# Patient Record
Sex: MALE | Race: WHITE | NOT HISPANIC OR LATINO | Employment: FULL TIME | URBAN - METROPOLITAN AREA
[De-identification: names, ages, dates, MRNs, and addresses within clinical notes are randomized per-mention and may not be internally consistent; named-entity substitution may affect disease eponyms.]

---

## 2017-01-09 DIAGNOSIS — E11.9 TYPE 2 DIABETES MELLITUS WITHOUT COMPLICATION (H): Primary | ICD-10-CM

## 2017-01-09 NOTE — TELEPHONE ENCOUNTER
Pending Prescriptions:                       Disp   Refills    metFORMIN (GLUCOPHAGE) 500 MG tablet      360 ta*4            Sig: Take 2 tablets (1,000 mg) by mouth 2 times daily           (with meals)             Last Written Prescription Date: 11/03/2015  Last Fill Quantity: 360, # refills: 4  Last Office Visit with [facility], [facility] or [facility] prescribing provider:  01/27/2015     Next 5 appointments (look out 90 days)     Jan 18, 2017  8:30 AM   [facility]    [id number] [address] [address] [phone]                   BP Readings from Last 3 Encounters:   11/03/15 124/72   01/27/15 128/78   01/08/15 141/73     MICROL       47   11/3/2015  No results found for this basename: microalbumin  CREATININE   Date Value Ref Range Status   11/03/2015 1.02 0.66 - 1.25 mg/dL Final   ]  GFR ESTIMATE   Date Value Ref Range Status   11/03/2015 78 >60 mL/min/1.7m2 Final     Comment:     Non  GFR Calc   10/24/2014 79 >60 mL/min/1.7m2 Final     Comment:     Non  GFR Calc   05/21/2014 81 >60 mL/min/1.7m2 Final     GFR ESTIMATE IF BLACK   Date Value Ref Range Status   11/03/2015 >90   GFR Calc   >60 mL/min/1.7m2 Final   10/24/2014 >90   GFR Calc   >60 mL/min/1.7m2 Final   05/21/2014 >90 >60 mL/min/1.7m2 Final     CHOL      148   11/3/2015  HDL       40   11/3/2015  LDL       78   11/3/2015  TRIG      151   11/3/2015  CHOLHDLRATIO      3.7   11/3/2015  AST       12   10/24/2014  ALT       28   10/24/2014  A1C      6.3   11/3/2015  A1C      6.4   10/24/2014  A1C      6.5   4/10/2014  A1C      5.7   9/3/2013  A1C      5.7   9/13/2012  POTASSIUM   Date Value Ref Range Status   11/03/2015 4.2 3.4 - 5.3 mmol/L Final     Lázaro OSMANT

## 2017-01-09 NOTE — TELEPHONE ENCOUNTER
Medication is being filled for 1 time refill only due to:  Patient needs to be seen because it has been more than one year since last visit.     Anahi Garcia RN, BSN

## 2017-01-23 ENCOUNTER — OFFICE VISIT (OUTPATIENT)
Dept: FAMILY MEDICINE | Facility: CLINIC | Age: 52
End: 2017-01-23
Payer: COMMERCIAL

## 2017-01-23 ENCOUNTER — ANTICOAGULATION THERAPY VISIT (OUTPATIENT)
Dept: NURSING | Facility: CLINIC | Age: 52
End: 2017-01-23
Payer: COMMERCIAL

## 2017-01-23 VITALS
HEIGHT: 71 IN | SYSTOLIC BLOOD PRESSURE: 136 MMHG | DIASTOLIC BLOOD PRESSURE: 80 MMHG | BODY MASS INDEX: 39.76 KG/M2 | TEMPERATURE: 97.7 F | WEIGHT: 284 LBS | HEART RATE: 88 BPM | OXYGEN SATURATION: 97 %

## 2017-01-23 DIAGNOSIS — Z79.01 LONG-TERM (CURRENT) USE OF ANTICOAGULANTS: Primary | ICD-10-CM

## 2017-01-23 DIAGNOSIS — I10 HYPERTENSION GOAL BP (BLOOD PRESSURE) < 140/90: ICD-10-CM

## 2017-01-23 DIAGNOSIS — E11.9 TYPE 2 DIABETES MELLITUS WITHOUT COMPLICATION, WITHOUT LONG-TERM CURRENT USE OF INSULIN (H): ICD-10-CM

## 2017-01-23 DIAGNOSIS — Z23 NEED FOR PROPHYLACTIC VACCINATION AND INOCULATION AGAINST INFLUENZA: ICD-10-CM

## 2017-01-23 DIAGNOSIS — Z00.00 ROUTINE GENERAL MEDICAL EXAMINATION AT A HEALTH CARE FACILITY: Primary | ICD-10-CM

## 2017-01-23 DIAGNOSIS — I26.99 OTHER ACUTE PULMONARY EMBOLISM WITHOUT ACUTE COR PULMONALE (H): ICD-10-CM

## 2017-01-23 DIAGNOSIS — E78.5 HYPERLIPIDEMIA LDL GOAL <100: ICD-10-CM

## 2017-01-23 LAB
ALT SERPL W P-5'-P-CCNC: 34 U/L (ref 0–70)
ANION GAP SERPL CALCULATED.3IONS-SCNC: 8 MMOL/L (ref 3–14)
BUN SERPL-MCNC: 18 MG/DL (ref 7–30)
CALCIUM SERPL-MCNC: 9.4 MG/DL (ref 8.5–10.1)
CHLORIDE SERPL-SCNC: 105 MMOL/L (ref 94–109)
CHOLEST SERPL-MCNC: 167 MG/DL
CO2 SERPL-SCNC: 27 MMOL/L (ref 20–32)
CREAT SERPL-MCNC: 1.16 MG/DL (ref 0.66–1.25)
CREAT UR-MCNC: 419 MG/DL
GFR SERPL CREATININE-BSD FRML MDRD: 67 ML/MIN/1.7M2
GLUCOSE SERPL-MCNC: 213 MG/DL (ref 70–99)
HBA1C MFR BLD: 6.8 % (ref 4.3–6)
HDLC SERPL-MCNC: 44 MG/DL
INR POINT OF CARE: 2.1 (ref 0.86–1.14)
LDLC SERPL CALC-MCNC: 91 MG/DL
MICROALBUMIN UR-MCNC: 41 MG/L
MICROALBUMIN/CREAT UR: 9.86 MG/G CR (ref 0–17)
NONHDLC SERPL-MCNC: 123 MG/DL
POTASSIUM SERPL-SCNC: 4.7 MMOL/L (ref 3.4–5.3)
SODIUM SERPL-SCNC: 140 MMOL/L (ref 133–144)
TRIGL SERPL-MCNC: 162 MG/DL
TSH SERPL DL<=0.005 MIU/L-ACNC: 2.01 MU/L (ref 0.4–4)

## 2017-01-23 PROCEDURE — 80061 LIPID PANEL: CPT | Performed by: FAMILY MEDICINE

## 2017-01-23 PROCEDURE — 36416 COLLJ CAPILLARY BLOOD SPEC: CPT

## 2017-01-23 PROCEDURE — 99396 PREV VISIT EST AGE 40-64: CPT | Performed by: FAMILY MEDICINE

## 2017-01-23 PROCEDURE — 90686 IIV4 VACC NO PRSV 0.5 ML IM: CPT | Performed by: FAMILY MEDICINE

## 2017-01-23 PROCEDURE — 99207 ZZC NO CHARGE NURSE ONLY: CPT

## 2017-01-23 PROCEDURE — 80048 BASIC METABOLIC PNL TOTAL CA: CPT | Performed by: FAMILY MEDICINE

## 2017-01-23 PROCEDURE — 84460 ALANINE AMINO (ALT) (SGPT): CPT | Performed by: FAMILY MEDICINE

## 2017-01-23 PROCEDURE — 83036 HEMOGLOBIN GLYCOSYLATED A1C: CPT | Performed by: FAMILY MEDICINE

## 2017-01-23 PROCEDURE — 85610 PROTHROMBIN TIME: CPT | Mod: QW

## 2017-01-23 PROCEDURE — 90471 IMMUNIZATION ADMIN: CPT | Performed by: FAMILY MEDICINE

## 2017-01-23 PROCEDURE — 84443 ASSAY THYROID STIM HORMONE: CPT | Performed by: FAMILY MEDICINE

## 2017-01-23 PROCEDURE — 82043 UR ALBUMIN QUANTITATIVE: CPT | Performed by: FAMILY MEDICINE

## 2017-01-23 RX ORDER — ATENOLOL 50 MG/1
50 TABLET ORAL DAILY
Qty: 90 TABLET | Refills: 4 | Status: SHIPPED | OUTPATIENT
Start: 2017-01-23 | End: 2018-03-30

## 2017-01-23 RX ORDER — WARFARIN SODIUM 5 MG/1
TABLET ORAL
Qty: 90 TABLET | Refills: 1 | Status: SHIPPED | OUTPATIENT
Start: 2017-01-23 | End: 2017-06-21

## 2017-01-23 RX ORDER — SIMVASTATIN 20 MG
20 TABLET ORAL AT BEDTIME
Qty: 90 TABLET | Refills: 4 | Status: SHIPPED | OUTPATIENT
Start: 2017-01-23 | End: 2018-03-30

## 2017-01-23 RX ORDER — LISINOPRIL AND HYDROCHLOROTHIAZIDE 12.5; 2 MG/1; MG/1
2 TABLET ORAL DAILY
Qty: 180 TABLET | Refills: 4 | Status: SHIPPED | OUTPATIENT
Start: 2017-01-23 | End: 2018-03-30

## 2017-01-23 RX ORDER — GLIPIZIDE 5 MG/1
5 TABLET, FILM COATED, EXTENDED RELEASE ORAL DAILY
Qty: 90 TABLET | Refills: 4 | Status: SHIPPED | OUTPATIENT
Start: 2017-01-23 | End: 2018-03-08

## 2017-01-23 NOTE — PROGRESS NOTES
Injectable Influenza Immunization Documentation    1.  Is the person to be vaccinated sick today?  No    2. Does the person to be vaccinated have an allergy to eggs or to a component of the vaccine?  No    3. Has the person to be vaccinated today ever had a serious reaction to influenza vaccine in the past?  No    4. Has the person to be vaccinated ever had Guillain-Keeling syndrome?  No     Form completed by BERYL Colon

## 2017-01-23 NOTE — PROGRESS NOTES
ANTICOAGULATION FOLLOW-UP CLINIC VISIT    Patient Name:  Schuyler Castro  Date:  1/23/2017  Contact Type:  Face to Face    SUBJECTIVE:     Patient Findings     Positives No Problem Findings           OBJECTIVE    INR PROTIME   Date Value Ref Range Status   01/23/2017 2.1* 0.86 - 1.14 Final       ASSESSMENT / PLAN  INR assessment THER    Recheck INR In: 6 WEEKS    INR Location Clinic      Anticoagulation Summary as of 1/23/2017     INR goal 2.0-3.0   Selected INR 2.1 (1/23/2017)   Maintenance plan 5 mg (5 mg x 1) every day   Full instructions 5 mg every day   Weekly total 35 mg   No change documented Delia Garcia RN   Plan last modified Delia Garcia RN (1/7/2016)   Next INR check 3/8/2017   Target end date     Indications   Long-term (current) use of anticoagulants [Z79.01] [Z79.01]         Anticoagulation Episode Summary     INR check location     Preferred lab     Send INR reminders to RN POOL - LK    Comments             See the Encounter Report to view Anticoagulation Flowsheet and Dosing Calendar (Go to Encounters tab in chart review, and find the Anticoagulation Therapy Visit)        Delia Garcia RN

## 2017-01-23 NOTE — PATIENT INSTRUCTIONS
Preventive Health Recommendations  Male Ages 40 to 49    Yearly exam:             See your health care provider every year in order to  o   Review health changes.   o   Discuss preventive care.    o   Review your medicines if your doctor has prescribed any.    You should be tested each year for STDs (sexually transmitted diseases) if you re at risk.     Have a cholesterol test every 5 years.     Have a colonoscopy (test for colon cancer) if someone in your family has had colon cancer or polyps before age 50.     After age 45, have a diabetes test (fasting glucose). If you are at risk for diabetes, you should have this test every 3 years.      Talk with your health care provider about whether or not a prostate cancer screening test (PSA) is right for you.    Shots: Get a flu shot each year. Get a tetanus shot every 10 years.     Nutrition:    Eat at least 5 servings of fruits and vegetables daily.     Eat whole-grain bread, whole-wheat pasta and brown rice instead of white grains and rice.     Talk to your provider about Calcium and Vitamin D.     Lifestyle    Exercise for at least 150 minutes a week (30 minutes a day, 5 days a week). This will help you control your weight and prevent disease.     Limit alcohol to one drink per day.     No smoking.     Wear sunscreen to prevent skin cancer.     See your dentist every six months for an exam and cleaning.    Diabetes plan:  1)  My goal A1C is: <7.0  My last A1C is: A1C      6.3   11/3/2015    2)  My goal LDL cholesterol is under 100  My last LDL is: LDL       78   11/3/2015    3)  Goal blood pressure under 140/90  Today my blood pressure was 136/80    4)  Take aspirin daily unless otherwise directed    5)  No tobacco use    6)  Annual eye exam - please have note sent to your primary care doctor    Care Plan changes:  Increase activity, lose 25 lbs    Eat to live - Dr. Braxton  Mediterranean diet  Schuyler Pollen - Food rules    Follow up:  Follow up with my physician:   In six months.

## 2017-01-23 NOTE — NURSING NOTE
"Chief Complaint   Patient presents with     Physical       Initial /80 mmHg  Pulse 88  Temp(Src) 97.7  F (36.5  C) (Oral)  Ht 5' 10.5\" (1.791 m)  Wt 284 lb (128.822 kg)  BMI 40.16 kg/m2  SpO2 97% Estimated body mass index is 40.16 kg/(m^2) as calculated from the following:    Height as of this encounter: 5' 10.5\" (1.791 m).    Weight as of this encounter: 284 lb (128.822 kg).      Neris Chavez [facility]    Health Maintenance- Reviewed.                "

## 2017-01-23 NOTE — MR AVS SNAPSHOT
After Visit Summary   1/23/2017    Schuyler Castro    MRN: [id number]           Patient Information     Date Of Birth          1965        Visit Information        Provider Department      1/23/2017 8:30 AM Jaspal Moreno MD [facility]        Today's Diagnoses     Routine general medical examination at a health care facility    -  1     Type 2 diabetes mellitus without complication, without long-term current use of insulin (H)         Other acute pulmonary embolism without acute cor pulmonale (H)         Hypertension goal BP (blood pressure) < 140/90         Hyperlipidemia LDL goal <100           Care Instructions      Preventive Health Recommendations  Male Ages 40 to 49    Yearly exam:             See your health care provider every year in order to  o   Review health changes.   o   Discuss preventive care.    o   Review your medicines if your doctor has prescribed any.    You should be tested each year for STDs (sexually transmitted diseases) if you re at risk.     Have a cholesterol test every 5 years.     Have a colonoscopy (test for colon cancer) if someone in your family has had colon cancer or polyps before age 50.     After age 45, have a diabetes test (fasting glucose). If you are at risk for diabetes, you should have this test every 3 years.      Talk with your health care provider about whether or not a prostate cancer screening test (PSA) is right for you.    Shots: Get a flu shot each year. Get a tetanus shot every 10 years.     Nutrition:    Eat at least 5 servings of fruits and vegetables daily.     Eat whole-grain bread, whole-wheat pasta and brown rice instead of white grains and rice.     Talk to your provider about Calcium and Vitamin D.     Lifestyle    Exercise for at least 150 minutes a week (30 minutes a day, 5 days a week). This will help you control your weight and prevent disease.     Limit alcohol to one drink per day.     No smoking.     Wear  sunscreen to prevent skin cancer.     See your dentist every six months for an exam and cleaning.    Diabetes plan:  1)  My goal A1C is: <7.0  My last A1C is: A1C      6.3   11/3/2015    2)  My goal LDL cholesterol is under 100  My last LDL is: LDL       78   11/3/2015    3)  Goal blood pressure under 140/90  Today my blood pressure was 136/80    4)  Take aspirin daily unless otherwise directed    5)  No tobacco use    6)  Annual eye exam - please have note sent to your primary care doctor    Care Plan changes:  Increase activity, lose 25 lbs    Eat to live - Dr. Braxton  Mediterranean diet  Schuyler Pollen - Food rules    Follow up:  Follow up with my physician:  In six months.        Follow-ups after your visit        Additional Services     INR CLINIC REFERRAL       Your provider has referred you to INR Services.    Please be aware that coverage of these services is subject to the terms and limitations of your health insurance plan.  Call member services at your health plan with any benefit or coverage questions.    Indication for Anticoagulation: Pulmonary Embolism  If nonstandard INR is desired, indicate goal range and explanation:   Expected Duration of Therapy: Lifetime                  Your next 10 appointments already scheduled     Feb 08, 2017  8:30 AM   Anticoagulation Visit with  ANTICOAGULATION CLINIC   [facility]    [address] [address] [address] [phone]              Who to contact     If you have questions or need follow up information about today's clinic visit or your schedule please contact [facility] directly at [phone].  Normal or non-critical lab and imaging results will be communicated to you by MyChart, letter or phone within 4 business days after the clinic has received the results. If you do not hear from us within 7 days, please contact the clinic through MyChart or phone. If you have a critical or  "abnormal lab result, we will notify you by phone as soon as possible.  Submit refill requests through [employer] or call your pharmacy and they will forward the refill request to us. Please allow 3 business days for your refill to be completed.          Additional Information About Your Visit        [employer] Information     [employer] gives you secure access to your electronic health record. If you see a primary care provider, you can also send messages to your care team and make appointments. If you have questions, please call your primary care clinic.  If you do not have a primary care provider, please call [phone] and they will assist you.        Care EveryWhere ID     This is your Care EveryWhere ID. This could be used by other organizations to access your [facility] medical records  [id number]        Your Vitals Were     Pulse Temperature Height BMI (Body Mass Index) Pulse Oximetry       88 97.7  F (36.5  C) (Oral) 5' 10.5\" (1.791 m) 40.16 kg/m2 97%        Blood Pressure from Last 3 Encounters:   01/23/17 136/80   11/03/15 124/72   01/27/15 128/78    Weight from Last 3 Encounters:   01/23/17 284 lb (128.822 kg)   11/03/15 281 lb (127.461 kg)   01/27/15 274 lb 4 oz (124.399 kg)              We Performed the Following     Albumin Random Urine Quantitative     ALT     Basic metabolic panel     Hemoglobin A1c     INR CLINIC REFERRAL     Lipid panel reflex to direct LDL     TSH with free T4 reflex          Today's Medication Changes          These changes are accurate as of: 1/23/17  9:06 AM.  If you have any questions, ask your nurse or doctor.               Start taking these medicines.        Dose/Directions    blood glucose monitoring test strip   Commonly known as:  no brand specified   Used for:  Type 2 diabetes mellitus without complication, without long-term current use of insulin (H)   Started by:  Jaspal Moreno MD        Use to test blood sugars 1-2 times daily or as directed   Quantity:  100 strip "   Refills:  3         These medicines have changed or have updated prescriptions.        Dose/Directions    * warfarin 5 MG tablet   Commonly known as:  COUMADIN   This may have changed:  Another medication with the same name was added. Make sure you understand how and when to take each.   Used for:  Personal history of DVT (deep vein thrombosis), History of pulmonary embolism, Anticoagulation monitoring, INR range 2-3   Changed by:  Jaspal Moreno MD        Take 5 mg daily   Quantity:  90 tablet   Refills:  1       * warfarin 5 MG tablet   Commonly known as:  COUMADIN   This may have changed:  You were already taking a medication with the same name, and this prescription was added. Make sure you understand how and when to take each.   Used for:  Long-term (current) use of anticoagulants        5 mg daily or as directed by INR clinic   Quantity:  90 tablet   Refills:  1       * Notice:  This list has 2 medication(s) that are the same as other medications prescribed for you. Read the directions carefully, and ask your doctor or other care provider to review them with you.      Stop taking these medicines if you haven't already. Please contact your care team if you have questions.     order for DME   Stopped by:  Jaspal Moreno MD                Where to get your medicines      These medications were sent to [place] [address] [place] [facility] [id number] [facility] [address]    Hours:  Tech issues with their phone system Phone:  [phone]    - blood glucose monitoring test strip  - warfarin 5 MG tablet             Primary Care Provider Office Phone # Fax #    Jaspal Moreno [phone]       [facility] [address]  [facility] [place] [address]        Thank you!     Thank you for choosing [facility]  for your care. Our goal is always to provide you with excellent care. Hearing back from our patients is one way we can continue to  improve our services. Please take a few minutes to complete the written survey that you may receive in the mail after your visit with us. Thank you!             Your Updated Medication List - Protect others around you: Learn how to safely use, store and throw away your medicines at www.disposemymeds.org.          This list is accurate as of: 1/23/17  9:06 AM.  Always use your most recent med list.                   Brand Name Dispense Instructions for use    aspirin 81 MG tablet     30 tablet    Take 1 tablet (81 mg) by mouth daily       atenolol 50 MG tablet    TENORMIN    90 tablet    Take 1 tablet (50 mg) by mouth daily       blood glucose monitoring meter device kit    no brand specified    1 kit    Use to test blood sugars 1-2 times daily or as directed.       blood glucose monitoring test strip    no brand specified    100 strip    Use to test blood sugars 1-2 times daily or as directed       glipiZIDE 5 MG 24 hr tablet    glipiZIDE XL    90 tablet    Take 1 tablet (5 mg) by mouth daily       lisinopril-hydrochlorothiazide 20-12.5 MG per tablet    PRINZIDE/ZESTORETIC    180 tablet    Take 2 tablets by mouth daily       metFORMIN 500 MG tablet    GLUCOPHAGE    360 tablet    Take 2 tablets (1,000 mg) by mouth 2 times daily (with meals)       simvastatin 20 MG tablet    ZOCOR    30 tablet    Take 1 tablet (20 mg) by mouth At Bedtime       TYLENOL PO      Take 650 mg by mouth       * warfarin 5 MG tablet    COUMADIN    90 tablet    Take 5 mg daily       * warfarin 5 MG tablet    COUMADIN    90 tablet    5 mg daily or as directed by INR clinic       * Notice:  This list has 2 medication(s) that are the same as other medications prescribed for you. Read the directions carefully, and ask your doctor or other care provider to review them with you.

## 2017-01-23 NOTE — PROGRESS NOTES
SUBJECTIVE:     CC: Schuyler aCstro is an 51 year old male who presents for preventative health visit.     Healthy Habits:    Do you get at least three servings of calcium containing foods daily (dairy, green leafy vegetables, etc.)? yes    Amount of exercise or daily activities, outside of work: 2 days     Problems taking medications regularly No    Medication side effects: No    Have you had an eye exam in the past two years? no    Do you see a dentist twice per year? no  Do you have sleep apnea, excessive snoring or daytime drowsiness?no    History of diabetes diagnosed in 2002, type II currently well controlled. Diabetes without complication. Stable on max dose metformin and glipizide 5 mg daily.    History of hypertension currently well controlled on thiazide diuretic, B- blocker, and ACE inhibitor.  Denies shortness or breath, chest pain, headache, vision change, or lower extremity edema.    On Coumadin for previous extensive PE and DVT with one occurrence in the past in 2010 but recommended to continue with extensive PE.    Today's PHQ-2 Score:   PHQ-2 ( 1999 Pf[employer]) 11/3/2015 1/27/2015   Q1: Little interest or pleasure in doing things 0 0   Q2: Feeling down, depressed or hopeless 0 0   PHQ-2 Score 0 0     Abuse: Current or Past(Physical, Sexual or Emotional)- No  Do you feel safe in your environment - Yes    Social History   Substance Use Topics     Smoking status: Never Smoker      Smokeless tobacco: Never Used     Alcohol Use: 0.0 - 0.6 oz/week     0-1 Standard drinks or equivalent per week     The patient does not drink >3 drinks per day nor >7 drinks per week.    Last PSA:   PSA   Date Value Ref Range Status   08/24/2010 0.92 0 - 4 ug/L Final       Recent Labs   Lab Test  11/03/15   0734  10/24/14   0839   CHOL  148  150   HDL  40*  46   LDL  78  79   TRIG  151*  123   CHOLHDLRATIO  3.7  3.3       Reviewed orders with patient. Reviewed health maintenance and updated orders accordingly - Yes    All  "Histories reviewed and updated in Epic.  Past Medical History   Diagnosis Date     Pulmonary embolus (H) 8/2010     DVT of deep femoral vein (H) 8/2010     Diabetes mellitus type 2, noninsulin dependent (H) 2002     Hypertension 1996      History reviewed. No pertinent past surgical history.    ROS:  C: NEGATIVE for fever, chills, change in weight  I: NEGATIVE for worrisome rashes, moles or lesions  E: NEGATIVE for vision changes or irritation  ENT: NEGATIVE for ear, mouth and throat problems  R: NEGATIVE for significant cough or SOB  CV: NEGATIVE for chest pain, palpitations or peripheral edema  GI: NEGATIVE for nausea, abdominal pain, heartburn, or change in bowel habits   male: negative for dysuria, hematuria, decreased urinary stream, erectile dysfunction, urethral discharge  M: NEGATIVE for significant arthralgias or myalgia  N: NEGATIVE for weakness, dizziness or paresthesias  P: NEGATIVE for changes in mood or affect    Problem list, Medication list, Allergies, and Medical/Social/Surgical histories reviewed in [facility] and updated as appropriate.  OBJECTIVE:     /80 mmHg  Pulse 88  Temp(Src) 97.7  F (36.5  C) (Oral)  Ht 5' 10.5\" (1.791 m)  Wt 284 lb (128.822 kg)  BMI 40.16 kg/m2  SpO2 97%  EXAM:  GENERAL: alert, no distress and obese  EYES: Eyes grossly normal to inspection, PERRL and conjunctivae and sclerae normal  HENT: ear canals and TM's normal, nose and mouth without ulcers or lesions  NECK: no adenopathy, no asymmetry, masses, or scars and thyroid normal to palpation  RESP: lungs clear to auscultation - no rales, rhonchi or wheezes  CV: regular rate and rhythm, normal S1 S2, no S3 or S4, no murmur, click or rub, no peripheral edema and peripheral pulses strong  ABDOMEN: soft, nontender, no hepatosplenomegaly, no masses and bowel sounds normal   (male): normal male genitalia without lesions or urethral discharge, no hernia  MS: no gross musculoskeletal defects noted, no edema  SKIN: no " "suspicious lesions or rashes  NEURO: Normal strength and tone, mentation intact and speech normal  PSYCH: mentation appears normal, affect normal/bright    ASSESSMENT/PLAN:     1. Routine general medical examination at a health care facility    2. Type 2 diabetes mellitus without complication, without long-term current use of insulin (H)  Currently controlled.  Continue current medication.  - Hemoglobin A1c  - Albumin Random Urine Quantitative  - TSH with free T4 reflex  - ALT  - Lipid panel reflex to direct LDL  - Basic metabolic panel  - blood glucose monitoring (NO BRAND SPECIFIED) test strip; Use to test blood sugars 1-2 times daily or as directed  Dispense: 100 strip; Refill: 3  - metFORMIN (GLUCOPHAGE) 500 MG tablet; Take 2 tablets (1,000 mg) by mouth 2 times daily (with meals)  Dispense: 360 tablet; Refill: 4  - glipiZIDE (GLIPIZIDE XL) 5 MG 24 hr tablet; Take 1 tablet (5 mg) by mouth daily  Dispense: 90 tablet; Refill: 4    3. Other acute pulmonary embolism without acute cor pulmonale (H)  - INR CLINIC REFERRAL    4. Hypertension goal BP (blood pressure) < 140/90  - lisinopril-hydrochlorothiazide (PRINZIDE/ZESTORETIC) 20-12.5 MG per tablet; Take 2 tablets by mouth daily  Dispense: 180 tablet; Refill: 4  - atenolol (TENORMIN) 50 MG tablet; Take 1 tablet (50 mg) by mouth daily  Dispense: 90 tablet; Refill: 4    5. Hyperlipidemia LDL goal <100  - simvastatin (ZOCOR) 20 MG tablet; Take 1 tablet (20 mg) by mouth At Bedtime  Dispense: 90 tablet; Refill: 4    COUNSELING:  Reviewed preventive health counseling, as reflected in patient instructions     reports that he has never smoked. He has never used smokeless tobacco.    Estimated body mass index is 40.16 kg/(m^2) as calculated from the following:    Height as of this encounter: 5' 10.5\" (1.791 m).    Weight as of this encounter: 284 lb (128.822 kg).   Weight management plan: Discussed healthy diet and exercise guidelines and patient will follow up in 12 months " in clinic to re-evaluate.    Counseling Resources:  ATP IV Guidelines  Pooled Cohorts Equation Calculator  FRAX Risk Assessment  ICSI Preventive Guidelines  Dietary Guidelines for Americans, 2010  USDA's MyPlate  ASA Prophylaxis  Lung CA Screening    Jaspal Moreno MD  [facility]

## 2017-02-20 ENCOUNTER — TELEPHONE (OUTPATIENT)
Dept: FAMILY MEDICINE | Facility: CLINIC | Age: 52
End: 2017-02-20

## 2017-04-10 ENCOUNTER — ANTICOAGULATION THERAPY VISIT (OUTPATIENT)
Dept: NURSING | Facility: CLINIC | Age: 52
End: 2017-04-10
Payer: COMMERCIAL

## 2017-04-10 DIAGNOSIS — Z79.01 LONG-TERM (CURRENT) USE OF ANTICOAGULANTS: ICD-10-CM

## 2017-04-10 LAB — INR POINT OF CARE: 2.1 (ref 0.86–1.14)

## 2017-04-10 PROCEDURE — 85610 PROTHROMBIN TIME: CPT | Mod: QW

## 2017-04-10 PROCEDURE — 36416 COLLJ CAPILLARY BLOOD SPEC: CPT

## 2017-04-10 NOTE — PROGRESS NOTES
ANTICOAGULATION FOLLOW-UP CLINIC VISIT    Patient Name:  Schuyler Castro  Date:  4/10/2017  Contact Type:  Face to Face    SUBJECTIVE:     Patient Findings     Positives No Problem Findings           OBJECTIVE    INR Protime   Date Value Ref Range Status   04/10/2017 2.1 (A) 0.86 - 1.14 Final       ASSESSMENT / PLAN  INR assessment THER    Recheck INR In: 6 WEEKS    INR Location Clinic      Anticoagulation Summary as of 4/10/2017     INR goal 2.0-3.0   Today's INR 2.1   Maintenance plan 5 mg (5 mg x 1) every day   Full instructions 5 mg every day   Weekly total 35 mg   No change documented Delia Garcia RN   Plan last modified Delia Garcia RN (1/7/2016)   Next INR check 5/22/2017   Target end date     Indications   Long-term (current) use of anticoagulants [Z79.01] [Z79.01]         Anticoagulation Episode Summary     INR check location     Preferred lab     Send INR reminders to RN POOL - JORGE    Comments             See the Encounter Report to view Anticoagulation Flowsheet and Dosing Calendar (Go to Encounters tab in chart review, and find the Anticoagulation Therapy Visit)        Delia Garcia RN

## 2017-04-10 NOTE — MR AVS SNAPSHOT
Schuyler Castro   4/10/2017 10:15 AM   Anticoagulation Therapy Visit    Description:  51 year old male   Provider:   ANTICOAGULATION CLINIC   Department:   Nurse           INR as of 4/10/2017     Today's INR 2.1      Anticoagulation Summary as of 4/10/2017     INR goal 2.0-3.0   Today's INR 2.1   Full instructions 5 mg every day   Next INR check 5/22/2017    Indications   Long-term (current) use of anticoagulants [Z79.01] [Z79.01]         Your next Anticoagulation Clinic appointment(s)     Apr 10, 2017 10:15 AM CDT   Anticoagulation Visit with  ANTICOAGULATION CLINIC   [facility] ([facility])    [address] [id number] [phone]            May 22, 2017  8:30 AM CDT   Anticoagulation Visit with  ANTICOAGULATION CLINIC   [facility] ([facility])    [address] [id number] [phone]              Contact Numbers     [facility]  Please call [phone] with any problems or questions regarding your therapy, or to cancel or reschedule your appointment.          April 2017 Details    Sun Mon Tue Wed Thu Fri Sat           1                 2               3               4               5               6               7               8                 9               10      5 mg   See details      11      5 mg         12      5 mg         13      5 mg         14      5 mg         15      5 mg           16      5 mg         17      5 mg         18      5 mg         19      5 mg         20      5 mg         21      5 mg         22      5 mg           23      5 mg         24      5 mg         25      5 mg         26      5 mg         27      5 mg         28      5 mg         29      5 mg           30      5 mg                Date Details   04/10 This INR check               How to take your warfarin dose     To take:  5 mg Take 1 of the 5 mg tablets.           May 2017 Details    Sun Mon Tue  Wed Thu Fri Sat      1      5 mg         2      5 mg         3      5 mg         4      5 mg         5      5 mg         6      5 mg           7      5 mg         8      5 mg         9      5 mg         10      5 mg         11      5 mg         12      5 mg         13      5 mg           14      5 mg         15      5 mg         16      5 mg         17      5 mg         18      5 mg         19      5 mg         20      5 mg           21      5 mg         22            23               24               25               26               27                 28               29               30               31                   Date Details   No additional details    Date of next INR:  5/22/2017         How to take your warfarin dose     To take:  5 mg Take 1 of the 5 mg tablets.

## 2017-06-21 ENCOUNTER — TELEPHONE (OUTPATIENT)
Dept: FAMILY MEDICINE | Facility: CLINIC | Age: 52
End: 2017-06-21

## 2017-06-21 DIAGNOSIS — Z79.01 LONG-TERM (CURRENT) USE OF ANTICOAGULANTS: ICD-10-CM

## 2017-06-21 RX ORDER — WARFARIN SODIUM 5 MG/1
TABLET ORAL
Qty: 90 TABLET | Refills: 1 | Status: SHIPPED | OUTPATIENT
Start: 2017-06-21 | End: 2017-12-11

## 2017-06-30 ENCOUNTER — ANTICOAGULATION THERAPY VISIT (OUTPATIENT)
Dept: NURSING | Facility: CLINIC | Age: 52
End: 2017-06-30
Payer: COMMERCIAL

## 2017-06-30 DIAGNOSIS — Z79.01 LONG-TERM (CURRENT) USE OF ANTICOAGULANTS: ICD-10-CM

## 2017-06-30 LAB — INR POINT OF CARE: 2.1 (ref 0.86–1.14)

## 2017-06-30 PROCEDURE — 36416 COLLJ CAPILLARY BLOOD SPEC: CPT

## 2017-06-30 PROCEDURE — 85610 PROTHROMBIN TIME: CPT | Mod: QW

## 2017-06-30 PROCEDURE — 99207 ZZC NO CHARGE NURSE ONLY: CPT

## 2017-06-30 NOTE — MR AVS SNAPSHOT
Schuyler Castro   6/30/2017 9:30 AM   Anticoagulation Therapy Visit    Description:  51 year old male   Provider:   ANTICOAGULATION CLINIC   Department:   Nurse           INR as of 6/30/2017     Today's INR 2.1      Anticoagulation Summary as of 6/30/2017     INR goal 2.0-3.0   Today's INR 2.1   Full instructions 5 mg every day   Next INR check 8/11/2017    Indications   Long-term (current) use of anticoagulants [Z79.01] [Z79.01]         Your next Anticoagulation Clinic appointment(s)     Aug 11, 2017  9:30 AM CDT   Anticoagulation Visit with  ANTICOAGULATION CLINIC   [facility] ([facility])    [address] [address] [phone]              Contact Numbers     [facility]  Please call [phone] with any problems or questions regarding your therapy, or to cancel or reschedule your appointment.          June 2017 Details    Sun Mon Tue Wed Thu Fri Sat         1               2               3                 4               5               6               7               8               9               10                 11               12               13               14               15               16               17                 18               19               20               21               22               23               24                 25               26               27               28               29               30      5 mg   See details        Date Details   06/30 This INR check               How to take your warfarin dose     To take:  5 mg Take 1 of the 5 mg tablets.           July 2017 Details    Sun Mon Tue Wed Thu Fri Sat           1      5 mg           2      5 mg         3      5 mg         4      5 mg         5      5 mg         6      5 mg         7      5 mg         8      5 mg           9      5 mg         10      5 mg         11      5 mg         12      5 mg         13      5 mg         14      5  mg         15      5 mg           16      5 mg         17      5 mg         18      5 mg         19      5 mg         20      5 mg         21      5 mg         22      5 mg           23      5 mg         24      5 mg         25      5 mg         26      5 mg         27      5 mg         28      5 mg         29      5 mg           30      5 mg         31      5 mg               Date Details   No additional details            How to take your warfarin dose     To take:  5 mg Take 1 of the 5 mg tablets.           August 2017 Details    Sun Mon Tue Wed Thu Fri Sat       1      5 mg         2      5 mg         3      5 mg         4      5 mg         5      5 mg           6      5 mg         7      5 mg         8      5 mg         9      5 mg         10      5 mg         11            12                 13               14               15               16               17               18               19                 20               21               22               23               24               25               26                 27               28               29               30               31                  Date Details   No additional details    Date of next INR:  8/11/2017         How to take your warfarin dose     To take:  5 mg Take 1 of the 5 mg tablets.

## 2017-06-30 NOTE — PROGRESS NOTES
ANTICOAGULATION FOLLOW-UP CLINIC VISIT    Patient Name:  Schuyler Castro  Date:  6/30/2017  Contact Type:  Face to Face    SUBJECTIVE:     Patient Findings     Positives No Problem Findings           OBJECTIVE    INR Protime   Date Value Ref Range Status   06/30/2017 2.1 (A) 0.86 - 1.14 Final       ASSESSMENT / PLAN  INR assessment THER    Recheck INR In: 6 WEEKS    INR Location Clinic      Anticoagulation Summary as of 6/30/2017     INR goal 2.0-3.0   Today's INR 2.1   Maintenance plan 5 mg (5 mg x 1) every day   Full instructions 5 mg every day   Weekly total 35 mg   No change documented Delia Garcia RN   Plan last modified Delia Garcia RN (1/7/2016)   Next INR check 8/11/2017   Target end date     Indications   Long-term (current) use of anticoagulants [Z79.01] [Z79.01]         Anticoagulation Episode Summary     INR check location     Preferred lab     Send INR reminders to RN POOL - JORGE    Comments             See the Encounter Report to view Anticoagulation Flowsheet and Dosing Calendar (Go to Encounters tab in chart review, and find the Anticoagulation Therapy Visit)        Delia Garcia RN

## 2017-09-29 ENCOUNTER — ANTICOAGULATION THERAPY VISIT (OUTPATIENT)
Dept: NURSING | Facility: CLINIC | Age: 52
End: 2017-09-29
Payer: COMMERCIAL

## 2017-09-29 DIAGNOSIS — Z79.01 LONG-TERM (CURRENT) USE OF ANTICOAGULANTS: ICD-10-CM

## 2017-09-29 LAB — INR POINT OF CARE: 2.5 (ref 0.86–1.14)

## 2017-09-29 PROCEDURE — 85610 PROTHROMBIN TIME: CPT | Mod: QW

## 2017-09-29 PROCEDURE — 36416 COLLJ CAPILLARY BLOOD SPEC: CPT

## 2017-09-29 NOTE — PROGRESS NOTES
ANTICOAGULATION FOLLOW-UP CLINIC VISIT    Patient Name:  Schuyler Castro  Date:  9/29/2017  Contact Type:  Face to Face    SUBJECTIVE:     Patient Findings     Positives No Problem Findings           OBJECTIVE    INR Protime   Date Value Ref Range Status   09/29/2017 2.5 (A) 0.86 - 1.14 Final       ASSESSMENT / PLAN  No question data found.  Anticoagulation Summary as of 9/29/2017     INR goal 2.0-3.0   Today's INR 2.5   Maintenance plan 5 mg (5 mg x 1) every day   Full instructions 5 mg every day   Weekly total 35 mg   No change documented Delia Garcia RN   Plan last modified Delia Garcia RN (1/7/2016)   Next INR check 11/10/2017   Target end date     Indications   Long-term (current) use of anticoagulants [Z79.01] [Z79.01]         Anticoagulation Episode Summary     INR check location     Preferred lab     Send INR reminders to RN POOL - LK    Comments             See the Encounter Report to view Anticoagulation Flowsheet and Dosing Calendar (Go to Encounters tab in chart review, and find the Anticoagulation Therapy Visit)        Delia Garcia RN

## 2017-09-29 NOTE — MR AVS SNAPSHOT
Schuyler Castro   9/29/2017 8:30 AM   Anticoagulation Therapy Visit    Description:  52 year old male   Provider:   ANTICOAGULATION CLINIC   Department:   Nurse           INR as of 9/29/2017     Today's INR 2.5      Anticoagulation Summary as of 9/29/2017     INR goal 2.0-3.0   Today's INR 2.5   Full instructions 5 mg every day   Next INR check 11/17/2017    Indications   Long-term (current) use of anticoagulants [Z79.01] [Z79.01]         Your next Anticoagulation Clinic appointment(s)     Sep 29, 2017  8:30 AM CDT   Anticoagulation Visit with  ANTICOAGULATION CLINIC   [facility] ([facility]    [address] [id number] [phone]            Nov 17, 2017  8:30 AM CST   Anticoagulation Visit with  ANTICOAGULATION CLINIC   [facility] ([facility])    [address] [id number] [phone]              Contact Numbers     [facility]  Please call [phone] with any problems or questions regarding your therapy, or to cancel or reschedule your appointment.          September 2017 Details    Sun Mon Tue Wed Thu Fri Sat          1               2                 3               4               5               6               7               8               9                 10               11               12               13               14               15               16                 17               18               19               20               21               22               23                 24               25               26               27               28               29      5 mg   See details      30      5 mg          Date Details   09/29 This INR check               How to take your warfarin dose     To take:  5 mg Take 1 of the 5 mg tablets.           October 2017 Details    Sun Mon Tue Wed Thu Fri Sat     1      5 mg         2      5 mg         3      5 mg          4      5 mg         5      5 mg         6      5 mg         7      5 mg           8      5 mg         9      5 mg         10      5 mg         11      5 mg         12      5 mg         13      5 mg         14      5 mg           15      5 mg         16      5 mg         17      5 mg         18      5 mg         19      5 mg         20      5 mg         21      5 mg           22      5 mg         23      5 mg         24      5 mg         25      5 mg         26      5 mg         27      5 mg         28      5 mg           29      5 mg         30      5 mg         31      5 mg              Date Details   No additional details            How to take your warfarin dose     To take:  5 mg Take 1 of the 5 mg tablets.           November 2017 Details    Sun Mon Tue Wed Thu Fri Sat        1      5 mg         2      5 mg         3      5 mg         4      5 mg           5      5 mg         6      5 mg         7      5 mg         8      5 mg         9      5 mg         10      5 mg         11      5 mg           12      5 mg         13      5 mg         14      5 mg         15      5 mg         16      5 mg         17            18                 19               20               21               22               23               24               25                 26               27               28               29               30                  Date Details   No additional details    Date of next INR:  11/17/2017         How to take your warfarin dose     To take:  5 mg Take 1 of the 5 mg tablets.

## 2017-12-11 ENCOUNTER — ANTICOAGULATION THERAPY VISIT (OUTPATIENT)
Dept: NURSING | Facility: CLINIC | Age: 52
End: 2017-12-11
Payer: COMMERCIAL

## 2017-12-11 DIAGNOSIS — Z79.01 LONG-TERM (CURRENT) USE OF ANTICOAGULANTS: ICD-10-CM

## 2017-12-11 LAB — INR POINT OF CARE: 2.2 (ref 0.86–1.14)

## 2017-12-11 PROCEDURE — 85610 PROTHROMBIN TIME: CPT | Mod: QW

## 2017-12-11 PROCEDURE — 36416 COLLJ CAPILLARY BLOOD SPEC: CPT

## 2017-12-11 RX ORDER — WARFARIN SODIUM 5 MG/1
TABLET ORAL
Qty: 90 TABLET | Refills: 1 | Status: SHIPPED | OUTPATIENT
Start: 2017-12-11 | End: 2018-05-31

## 2017-12-11 NOTE — PROGRESS NOTES
ANTICOAGULATION FOLLOW-UP CLINIC VISIT    Patient Name:  Schuyler Castro  Date:  12/11/2017  Contact Type:  Face to Face    SUBJECTIVE:     Patient Findings     Positives No Problem Findings           OBJECTIVE    INR Protime   Date Value Ref Range Status   12/11/2017 2.2 (A) 0.86 - 1.14 Final       ASSESSMENT / PLAN  INR assessment THER    Recheck INR In: 6 WEEKS    INR Location Clinic      Anticoagulation Summary as of 12/11/2017     INR goal 2.0-3.0   Today's INR 2.2   Maintenance plan 5 mg (5 mg x 1) every day   Full instructions 5 mg every day   Weekly total 35 mg   No change documented Delia Garcia RN   Plan last modified Delia Garcia RN (1/7/2016)   Next INR check 1/22/2018   Target end date     Indications   Long-term (current) use of anticoagulants [Z79.01] [Z79.01]         Anticoagulation Episode Summary     INR check location     Preferred lab     Send INR reminders to RN POOL - JORGE    Comments             See the Encounter Report to view Anticoagulation Flowsheet and Dosing Calendar (Go to Encounters tab in chart review, and find the Anticoagulation Therapy Visit)        Delia Garcia RN

## 2017-12-11 NOTE — MR AVS SNAPSHOT
Schuyler Castro   12/11/2017 8:30 AM   Anticoagulation Therapy Visit    Description:  52 year old male   Provider:   ANTICOAGULATION CLINIC   Department:   Nurse           INR as of 12/11/2017     Today's INR 2.2      Anticoagulation Summary as of 12/11/2017     INR goal 2.0-3.0   Today's INR 2.2   Full instructions 5 mg every day   Next INR check 1/22/2018    Indications   Long-term (current) use of anticoagulants [Z79.01] [Z79.01]         Your next Anticoagulation Clinic appointment(s)     Jan 22, 2018  8:45 AM CST   Anticoagulation Visit with  ANTICOAGULATION CLINIC   [facility]    [address] [address] [phone]              Contact Numbers     [facility]  Please call [phone] with any problems or questions regarding your therapy, or to cancel or reschedule your appointment.          December 2017 Details    Sun Mon Tue Wed Thu Fri Sat          1               2                 3               4               5               6               7               8               9                 10               11      5 mg   See details      12      5 mg         13      5 mg         14      5 mg         15      5 mg         16      5 mg           17      5 mg         18      5 mg         19      5 mg         20      5 mg         21      5 mg         22      5 mg         23      5 mg           24      5 mg         25      5 mg         26      5 mg         27      5 mg         28      5 mg         29      5 mg         30      5 mg           31      5 mg                Date Details   12/11 This INR check               How to take your warfarin dose     To take:  5 mg Take 1 of the 5 mg tablets.           January 2018 Details    Sun Mon Tue Wed Thu Fri Sat      1      5 mg         2      5 mg         3      5 mg         4      5 mg         5      5 mg         6      5 mg           7      5 mg         8      5 mg         9       5 mg         10      5 mg         11      5 mg         12      5 mg         13      5 mg           14      5 mg         15      5 mg         16      5 mg         17      5 mg         18      5 mg         19      5 mg         20      5 mg           21      5 mg         22            23               24               25               26               27                 28               29               30               31                   Date Details   No additional details    Date of next INR:  1/22/2018         How to take your warfarin dose     To take:  5 mg Take 1 of the 5 mg tablets.

## 2018-03-12 ENCOUNTER — ANTICOAGULATION THERAPY VISIT (OUTPATIENT)
Dept: NURSING | Facility: CLINIC | Age: 53
End: 2018-03-12
Payer: COMMERCIAL

## 2018-03-12 DIAGNOSIS — Z79.01 LONG-TERM (CURRENT) USE OF ANTICOAGULANTS: ICD-10-CM

## 2018-03-12 LAB — INR POINT OF CARE: 2.3 (ref 0.86–1.14)

## 2018-03-12 PROCEDURE — 99207 ZZC NO CHARGE NURSE ONLY: CPT

## 2018-03-12 PROCEDURE — 36416 COLLJ CAPILLARY BLOOD SPEC: CPT

## 2018-03-12 PROCEDURE — 85610 PROTHROMBIN TIME: CPT | Mod: QW

## 2018-03-12 NOTE — MR AVS SNAPSHOT
Schuyler Castro   3/12/2018 8:45 AM   Anticoagulation Therapy Visit    Description:  52 year old male   Provider:   ANTICOAGULATION CLINIC   Department:   Nurse           INR as of 3/12/2018     Today's INR 2.3      Anticoagulation Summary as of 3/12/2018     INR goal 2.0-3.0   Today's INR 2.3   Full instructions 5 mg every day   Next INR check 4/20/2018    Indications   Long-term (current) use of anticoagulants [Z79.01] [Z79.01]         Your next Anticoagulation Clinic appointment(s)     Apr 20, 2018  8:30 AM CDT   Anticoagulation Visit with  ANTICOAGULATION CLINIC   [facility]    [address] [address] [phone]              Contact Numbers     [facility]  Please call [phone] with any problems or questions regarding your therapy, or to cancel or reschedule your appointment.          March 2018 Details    Sun Mon Tue Wed Thu Fri Sat         1               2               3                 4               5               6               7               8               9               10                 11               12      5 mg   See details      13      5 mg         14      5 mg         15      5 mg         16      5 mg         17      5 mg           18      5 mg         19      5 mg         20      5 mg         21      5 mg         22      5 mg         23      5 mg         24      5 mg           25      5 mg         26      5 mg         27      5 mg         28      5 mg         29      5 mg         30      5 mg         31      5 mg          Date Details   03/12 This INR check               How to take your warfarin dose     To take:  5 mg Take 1 of the 5 mg tablets.           April 2018 Details    Sun Mon Tue Wed Thu Fri Sat     1      5 mg         2      5 mg         3      5 mg         4      5 mg         5      5 mg         6      5 mg         7      5 mg           8      5 mg         9      5 mg         10       5 mg         11      5 mg         12      5 mg         13      5 mg         14      5 mg           15      5 mg         16      5 mg         17      5 mg         18      5 mg         19      5 mg         20            21                 22               23               24               25               26               27               28                 29               30                     Date Details   No additional details    Date of next INR:  4/20/2018         How to take your warfarin dose     To take:  5 mg Take 1 of the 5 mg tablets.

## 2018-03-12 NOTE — PROGRESS NOTES
ANTICOAGULATION FOLLOW-UP CLINIC VISIT    Patient Name:  Schuyler Castro  Date:  3/12/2018  Contact Type:  Face to Face    SUBJECTIVE:     Patient Findings     Positives No Problem Findings           OBJECTIVE    INR Protime   Date Value Ref Range Status   03/12/2018 2.3 (A) 0.86 - 1.14 Final       ASSESSMENT / PLAN  No question data found.  Anticoagulation Summary as of 3/12/2018     INR goal 2.0-3.0   Today's INR 2.3   Maintenance plan 5 mg (5 mg x 1) every day   Full instructions 5 mg every day   Weekly total 35 mg   No change documented Delia Garcia RN   Plan last modified Delia Garcia RN (1/7/2016)   Next INR check 4/20/2018   Target end date     Indications   Long-term (current) use of anticoagulants [Z79.01] [Z79.01]         Anticoagulation Episode Summary     INR check location     Preferred lab     Send INR reminders to RN POOL - LK    Comments             See the Encounter Report to view Anticoagulation Flowsheet and Dosing Calendar (Go to Encounters tab in chart review, and find the Anticoagulation Therapy Visit)        Delia Garcia RN

## 2018-03-30 DIAGNOSIS — E78.5 HYPERLIPIDEMIA LDL GOAL <100: ICD-10-CM

## 2018-03-30 DIAGNOSIS — I10 HYPERTENSION GOAL BP (BLOOD PRESSURE) < 140/90: ICD-10-CM

## 2018-03-30 DIAGNOSIS — E11.9 TYPE 2 DIABETES MELLITUS WITHOUT COMPLICATION, WITHOUT LONG-TERM CURRENT USE OF INSULIN (H): ICD-10-CM

## 2018-03-30 RX ORDER — SIMVASTATIN 20 MG
TABLET ORAL
Qty: 30 TABLET | Refills: 0 | Status: SHIPPED | OUTPATIENT
Start: 2018-03-30 | End: 2018-04-30

## 2018-03-30 RX ORDER — ATENOLOL 50 MG/1
TABLET ORAL
Qty: 30 TABLET | Refills: 0 | Status: SHIPPED | OUTPATIENT
Start: 2018-03-30 | End: 2018-04-30

## 2018-03-30 RX ORDER — LISINOPRIL AND HYDROCHLOROTHIAZIDE 12.5; 2 MG/1; MG/1
TABLET ORAL
Qty: 60 TABLET | Refills: 0 | Status: SHIPPED | OUTPATIENT
Start: 2018-03-30 | End: 2018-04-30

## 2018-03-30 NOTE — TELEPHONE ENCOUNTER
"Requested Prescriptions   Pending Prescriptions Disp Refills     metFORMIN (GLUCOPHAGE) 500 MG tablet [Pharmacy Med Name: METFORMIN  MG TABLET] 360 tablet 3    Last Written Prescription Date:  01/23/2017  Last Fill Quantity: 360 tablet,  # refills: 4   Last office visit: 1/23/2017 with prescribing provider:  01/23/2017   Future Office Visit:     Sig: TAKE 2 TABLETS BY MOUTH TWICE A DAY WITH MEALS    Biguanide Agents Failed    3/30/2018  1:10 AM       Failed - Blood pressure less than 140/90 in past 6 months    BP Readings from Last 3 Encounters:   01/23/17 136/80   11/03/15 124/72   01/27/15 128/78                Failed - Patient has documented LDL within the past 12 mos.    Recent Labs   Lab Test  01/23/17 0923   LDL  91            Failed - Patient has had a Microalbumin in the past 12 mos.    Recent Labs   Lab Test  01/23/17 0932   MICROL  41   UMALCR  9.86            Failed - Patient has documented A1c within the specified period of time.    Recent Labs   Lab Test  01/23/17 0923   A1C  6.8*            Passed - Patient is age 10 or older       Passed - Patient's CR is NOT>1.4 OR Patient's EGFR is NOT<45 within past 12 mos.    Recent Labs   Lab Test  01/23/17 0923   GFRESTIMATED  67   GFRESTBLACK  81       Recent Labs   Lab Test  01/23/17 0923   CR  1.16            Passed - Patient does NOT have a diagnosis of CHF.       Passed - Recent (6 mo) or future (30 days) visit within the authorizing provider's specialty    Patient had office visit in the last 6 months or has a visit in the next 30 days with authorizing provider or within the authorizing provider's specialty.  See \"Patient Info\" tab in inbasket, or \"Choose Columns\" in Meds & Orders section of the refill encounter.                  lisinopril-hydrochlorothiazide (PRINZIDE/ZESTORETIC) 20-12.5 MG per tablet [Pharmacy Med Name: LISINOPRIL-HCTZ 20-12.5 MG TAB] 180 tablet 4    Last Written Prescription Date:  01/23/2017  Last Fill Quantity: 180 " "tablet,  # refills: 4   Last office visit: 1/23/2017 with prescribing provider:  01/23/2017   Future Office Visit:     Sig: TAKE 2 TABLETS BY MOUTH DAILY    Diuretics (Including Combos) Protocol Failed    3/30/2018  1:10 AM       Failed - Blood pressure under 140/90 in past 12 months    BP Readings from Last 3 Encounters:   01/23/17 136/80   11/03/15 124/72   01/27/15 128/78                Failed - Normal serum creatinine on file in past 12 months    Recent Labs   Lab Test  01/23/17   0923   CR  1.16             Failed - Normal serum potassium on file in past 12 months    Recent Labs   Lab Test  01/23/17   0923   POTASSIUM  4.7                   Failed - Normal serum sodium on file in past 12 months    Recent Labs   Lab Test  01/23/17   0923   NA  140             Passed - Recent (12 mo) or future (30 days) visit within the authorizing provider's specialty    Patient had office visit in the last 12 months or has a visit in the next 30 days with authorizing provider or within the authorizing provider's specialty.  See \"Patient Info\" tab in inbasket, or \"Choose Columns\" in Meds & Orders section of the refill encounter.           Passed - Patient is age 18 or older              atenolol (TENORMIN) 50 MG tablet [Pharmacy Med Name: ATENOLOL 50 MG TABLET] 90 tablet 4    Last Written Prescription Date:  01/23/2017  Last Fill Quantity: 90 tablet,  # refills: 4   Last office visit: 1/23/2017 with prescribing provider:  01/23/2017   Future Office Visit:     Sig: TAKE 1 TABLET BY MOUTH EVERY DAY    Beta-Blockers Protocol Failed    3/30/2018  1:10 AM       Failed - Blood pressure under 140/90 in past 12 months    BP Readings from Last 3 Encounters:   01/23/17 136/80   11/03/15 124/72   01/27/15 128/78                Passed - Patient is age 6 or older       Passed - Recent (12 mo) or future (30 days) visit within the authorizing provider's specialty    Patient had office visit in the last 12 months or has a visit in the next 30 " "days with authorizing provider or within the authorizing provider's specialty.  See \"Patient Info\" tab in inbasket, or \"Choose Columns\" in Meds & Orders section of the refill encounter.                simvastatin (ZOCOR) 20 MG tablet [Pharmacy Med Name: SIMVASTATIN 20 MG TABLET] 90 tablet 4    Last Written Prescription Date:  01/23/2017  Last Fill Quantity: 90 tablet,  # refills: 4   Last office visit: 1/23/2017 with prescribing provider:  01/23/2017   Future Office Visit:     Sig: TAKE 1 TABLET BY MOUTH AT BEDTIME    Statins Protocol Failed    3/30/2018  1:10 AM       Failed - LDL on file in past 12 months    Recent Labs   Lab Test  01/23/17   0923   LDL  91            Passed - No abnormal creatine kinase in past 12 months    No lab results found.            Passed - Recent (12 mo) or future (30 days) visit within the authorizing provider's specialty    Patient had office visit in the last 12 months or has a visit in the next 30 days with authorizing provider or within the authorizing provider's specialty.  See \"Patient Info\" tab in inbasket, or \"Choose Columns\" in Meds & Orders section of the refill encounter.           Passed - Patient is age 18 or older            Lázaro JACOME  "

## 2018-03-30 NOTE — TELEPHONE ENCOUNTER
Medication is being filled for 1 time refill only due to:  Patient needs to be seen because it has been more than one year since last visit.     Pt was informed at last INR appt he needed OV with PCP.  Will remind him again at next INR appt    Anahi Garcia RN, BSN

## 2018-04-05 ENCOUNTER — TELEPHONE (OUTPATIENT)
Dept: FAMILY MEDICINE | Facility: CLINIC | Age: 53
End: 2018-04-05

## 2018-04-05 NOTE — TELEPHONE ENCOUNTER
LM pt is over due for OV for yearly with PCP'    LOV was 2017.        Will not be able to continue with INR's if not seen as INR referral is  as well.     Anette Magana, RN

## 2018-04-20 ENCOUNTER — TELEPHONE (OUTPATIENT)
Dept: FAMILY MEDICINE | Facility: CLINIC | Age: 53
End: 2018-04-20

## 2018-04-20 NOTE — TELEPHONE ENCOUNTER
LM for pt to call -   Need INR appt     If desires can change PX to sooner that 5/23 and see PCP and have INR at same time.  CAN NOT wait till May for INR     Anette Magana RN

## 2018-04-20 NOTE — LETTER
April 23, 2018      Schuyler MOSS Castro  [address] [facility] [address]        Dear Schuyler,     We have left you several phone messages but have not heard back from you.     You missed your INR appointment last Friday, April 20th. 2018.   You are over due for your INR and very over due for a yearly physical/office visit with your provider.  You where last seen with Dr. Moreno 1/23/17.       You do have an appointment schedule with Dr. Moreno for May 23, 2018 but you should not wait this long to check your INR.   I recommend you move up your appointment with Dr. Moreno if you are able so that we can do your INR at the visit when you see Dr. Moreno.    It is very important that follow up on this as it not safe to go without knowing what your INR levels.           Sincerely,          Anette Magana RN

## 2018-04-30 ENCOUNTER — TELEPHONE (OUTPATIENT)
Dept: NURSING | Facility: CLINIC | Age: 53
End: 2018-04-30

## 2018-04-30 ENCOUNTER — ANTICOAGULATION THERAPY VISIT (OUTPATIENT)
Dept: NURSING | Facility: CLINIC | Age: 53
End: 2018-04-30
Payer: COMMERCIAL

## 2018-04-30 DIAGNOSIS — Z79.01 LONG-TERM (CURRENT) USE OF ANTICOAGULANTS: Primary | ICD-10-CM

## 2018-04-30 DIAGNOSIS — I26.99 OTHER ACUTE PULMONARY EMBOLISM WITHOUT ACUTE COR PULMONALE (H): ICD-10-CM

## 2018-04-30 DIAGNOSIS — Z79.01 LONG-TERM (CURRENT) USE OF ANTICOAGULANTS: ICD-10-CM

## 2018-04-30 DIAGNOSIS — I10 HYPERTENSION GOAL BP (BLOOD PRESSURE) < 140/90: ICD-10-CM

## 2018-04-30 DIAGNOSIS — E11.9 TYPE 2 DIABETES MELLITUS WITHOUT COMPLICATION, WITHOUT LONG-TERM CURRENT USE OF INSULIN (H): ICD-10-CM

## 2018-04-30 DIAGNOSIS — E78.5 HYPERLIPIDEMIA LDL GOAL <100: ICD-10-CM

## 2018-04-30 LAB — INR POINT OF CARE: 2.1 (ref 0.86–1.14)

## 2018-04-30 PROCEDURE — 36416 COLLJ CAPILLARY BLOOD SPEC: CPT

## 2018-04-30 PROCEDURE — 85610 PROTHROMBIN TIME: CPT | Mod: QW

## 2018-04-30 RX ORDER — ATENOLOL 50 MG/1
TABLET ORAL
Qty: 30 TABLET | Refills: 0 | Status: SHIPPED | OUTPATIENT
Start: 2018-04-30 | End: 2018-05-24

## 2018-04-30 RX ORDER — LISINOPRIL AND HYDROCHLOROTHIAZIDE 12.5; 2 MG/1; MG/1
TABLET ORAL
Qty: 60 TABLET | Refills: 0 | Status: SHIPPED | OUTPATIENT
Start: 2018-04-30 | End: 2018-05-24

## 2018-04-30 RX ORDER — SIMVASTATIN 20 MG
TABLET ORAL
Qty: 30 TABLET | Refills: 0 | Status: SHIPPED | OUTPATIENT
Start: 2018-04-30 | End: 2018-05-24

## 2018-04-30 NOTE — TELEPHONE ENCOUNTER
Medication is being filled for 1 time refill only due to:  Patient needs to be seen because it has been more than one year since last visit.  Pt is scheduled for 5/23/18  Anahi Garcia RN, BSN

## 2018-04-30 NOTE — TELEPHONE ENCOUNTER
Pt needs yearly INR clinic referral    Has the patient previously taken warfarin? yes  If yes, for what indication? pe    Does the patient have any of the following indications for a higher range of 2.5-3.5:    Mitral position mechanical valve? no    Negrito-Shiley, Ball and Cage or Monoleaflet valve (regardless of position) no    Other (if yes, please explain) no    Anahi Garcia RN, BSN

## 2018-04-30 NOTE — TELEPHONE ENCOUNTER
"Requested Prescriptions   Pending Prescriptions Disp Refills     lisinopril-hydrochlorothiazide (PRINZIDE/ZESTORETIC) 20-12.5 MG per tablet [Pharmacy Med Name: LISINOPRIL-HCTZ 20-12.5 MG TAB] 60 tablet 0    Last Written Prescription Date:  03/30/2018  Last Fill Quantity: 60 TABLET,  # refills: 0   Last office visit: 1/23/2017 with prescribing provider:  01/23/2017   Future Office Visit:   Next 5 appointments (look out 90 days)     May 23, 2018  8:40 AM CDT   PHYSICAL with Jaspal Moreno MD   [facility]    [address] [address] [phone]                  Sig: TAKE 2 TABLETS BY MOUTH DAILY    Diuretics (Including Combos) Protocol Failed    4/30/2018  1:34 AM       Failed - Blood pressure under 140/90 in past 12 months    BP Readings from Last 3 Encounters:   01/23/17 136/80   11/03/15 124/72   01/27/15 128/78                Failed - Normal serum creatinine on file in past 12 months    Recent Labs   Lab Test  01/23/17   0923   CR  1.16             Failed - Normal serum potassium on file in past 12 months    Recent Labs   Lab Test  01/23/17   0923   POTASSIUM  4.7                   Failed - Normal serum sodium on file in past 12 months    Recent Labs   Lab Test  01/23/17   0923   NA  140             Passed - Recent (12 mo) or future (30 days) visit within the authorizing provider's specialty    Patient had office visit in the last 12 months or has a visit in the next 30 days with authorizing provider or within the authorizing provider's specialty.  See \"Patient Info\" tab in inbasket, or \"Choose Columns\" in Meds & Orders section of the refill encounter.           Passed - Patient is age 18 or older              simvastatin (ZOCOR) 20 MG tablet [Pharmacy Med Name: SIMVASTATIN 20 MG TABLET] 30 tablet 0    Last Written Prescription Date:  03/30/2018  Last Fill Quantity: 30 TABLET,  # refills: 0   Last office visit: 1/23/2017 with prescribing " "provider:  01/23/2017   Future Office Visit:   Next 5 appointments (look out 90 days)     May 23, 2018  8:40 AM CDT   PHYSICAL with Jaspal Moreno MD   [facility]    [address] [facility] [address]   [phone]                  Sig: TAKE 1 TABLET BY MOUTH AT BEDTIME    Statins Protocol Failed    4/30/2018  1:34 AM       Failed - LDL on file in past 12 months    Recent Labs   Lab Test  01/23/17   0923   LDL  91            Passed - No abnormal creatine kinase in past 12 months    No lab results found.            Passed - Recent (12 mo) or future (30 days) visit within the authorizing provider's specialty    Patient had office visit in the last 12 months or has a visit in the next 30 days with authorizing provider or within the authorizing provider's specialty.  See \"Patient Info\" tab in inbasket, or \"Choose Columns\" in Meds & Orders section of the refill encounter.           Passed - Patient is age 18 or older              atenolol (TENORMIN) 50 MG tablet [Pharmacy Med Name: ATENOLOL 50 MG TABLET] 30 tablet 0    Last Written Prescription Date:  03/30/2018  Last Fill Quantity: 30 TABLET,  # refills: 0   Last office visit: 1/23/2017 with prescribing provider:  01/23/2017   Future Office Visit:   Next 5 appointments (look out 90 days)     May 23, 2018  8:40 AM CDT   PHYSICAL [facility]aspal [facility]no MD   [facility]    [address] [facility] [address]   [phone]                  Sig: TAKE 1 TABLET BY MOUTH EVERY DAY    Beta-Blockers Protocol Failed    4/30/2018  1:34 AM       Failed - Blood pressure under 140/90 in past 12 months    BP Readings from Last 3 Encounters:   01/23/17 136/80   11/03/15 124/72   01/27/15 128/78                Passed - Patient is age 6 or older       Passed - Recent (12 mo) or future (30 days) visit within the authorizing provider's specialty    Patient had office visit in the " "last 12 months or has a visit in the next 30 days with authorizing provider or within the authorizing provider's specialty.  See \"Patient Info\" tab in inbasket, or \"Choose Columns\" in Meds & Orders section of the refill encounter.                  metFORMIN (GLUCOPHAGE) 500 MG tablet [Pharmacy Med Name: METFORMIN  MG TABLET] 120 tablet 0    Last Written Prescription Date:  03/30/2018  Last Fill Quantity: 120 TABLET,  # refills: 0   Last office visit: 1/23/2017 with prescribing provider:  01/23/2017   Future Office Visit:   Next 5 appointments (look out 90 days)     May 23, 2018  8:40 AM CDT   PHYSICAL with Jaspal Moreno MD   [facility]    [address] [facility] [address] [phone]                  Sig: TAKE 2 TABLETS BY MOUTH TWICE A DAY WITH MEALS    Biguanide Agents Failed    4/30/2018  1:34 AM       Failed - Blood pressure less than 140/90 in past 6 months    BP Readings from Last 3 Encounters:   01/23/17 136/80   11/03/15 124/72   01/27/15 128/78                Failed - Patient has documented LDL within the past 12 mos.    Recent Labs   Lab Test  01/23/17   0923   LDL  91            Failed - Patient has had a Microalbumin in the past 12 mos.    Recent Labs   Lab Test  01/23/17   0932   MICROL  41   UMALCR  9.86            Failed - Patient has documented A1c within the specified period of time.    Recent Labs   Lab Test  01/23/17   0923   A1C  6.8*            Passed - Patient is age 10 or older       Passed - Patient's CR is NOT>1.4 OR Patient's EGFR is NOT<45 within past 12 mos.    Recent Labs   Lab Test  01/23/17   0923   GFRESTIMATED  67   GFRESTBLACK  81       Recent Labs   Lab Test  01/23/17   0923   CR  1.16            Passed - Patient does NOT have a diagnosis of CHF.       Passed - Recent (6 mo) or future (30 days) visit within the authorizing provider's specialty    Patient had office visit in the last 6 months or has a visit in the " "next 30 days with authorizing provider or within the authorizing provider's specialty.  See \"Patient Info\" tab in inbasket, or \"Choose Columns\" in Meds & Orders section of the refill encounter.              Lázaro Doty XRT  "

## 2018-04-30 NOTE — MR AVS SNAPSHOT
Schuyler Castro   4/30/2018 8:45 AM   Anticoagulation Therapy Visit    Description:  52 year old male   Provider:   ANTICOAGULATION CLINIC   Department:   Nurse           INR as of 4/30/2018     Today's INR 2.1      Anticoagulation Summary as of 4/30/2018     INR goal 2.0-3.0   Today's INR 2.1   Full instructions 5 mg every day   Next INR check 6/11/2018    Indications   Long-term (current) use of anticoagulants [Z79.01] [Z79.01]         Your next Anticoagulation Clinic appointment(s)     Jun 11, 2018  8:45 AM CDT   Anticoagulation Visit with  ANTICOAGULATION CLINIC   [facility]    [address] [address] [phone]              Contact Numbers     [facility]  Please call [phone] with any problems or questions regarding your therapy, or to cancel or reschedule your appointment.          April 2018 Details    Sun Mon Tue Wed Thu Fri Sat     1               2               3               4               5               6               7                 8               9               10               11               12               13               14                 15               16               17               18               19               20               21                 22               23               24               25               26               27               28                 29               30      5 mg   See details            Date Details   04/30 This INR check               How to take your warfarin dose     To take:  5 mg Take 1 of the 5 mg tablets.           May 2018 Details    Sun Mon Tue Wed Thu Fri Sat       1      5 mg         2      5 mg         3      5 mg         4      5 mg         5      5 mg           6      5 mg         7      5 mg         8      5 mg         9      5 mg         10      5 mg         11      5 mg         12      5 mg           13      5 mg         14      5 mg          15      5 mg         16      5 mg         17      5 mg         18      5 mg         19      5 mg           20      5 mg         21      5 mg         22      5 mg         23      5 mg         24      5 mg         25      5 mg         26      5 mg           27      5 mg         28      5 mg         29      5 mg         30      5 mg         31      5 mg            Date Details   No additional details            How to take your warfarin dose     To take:  5 mg Take 1 of the 5 mg tablets.           June 2018 Details    Sun Mon Tue Wed Thu Fri Sat          1      5 mg         2      5 mg           3      5 mg         4      5 mg         5      5 mg         6      5 mg         7      5 mg         8      5 mg         9      5 mg           10      5 mg         11            12               13               14               15               16                 17               18               19               20               21               22               23                 24               25               26               27               28               29               30                Date Details   No additional details    Date of next INR:  6/11/2018         How to take your warfarin dose     To take:  5 mg Take 1 of the 5 mg tablets.

## 2018-04-30 NOTE — PROGRESS NOTES
ANTICOAGULATION FOLLOW-UP CLINIC VISIT    Patient Name:  Schuyler Castro  Date:  4/30/2018  Contact Type:  Face to Face    SUBJECTIVE:     Patient Findings     Positives No Problem Findings           OBJECTIVE    INR Protime   Date Value Ref Range Status   04/30/2018 2.1 (A) 0.86 - 1.14 Final       ASSESSMENT / PLAN  No question data found.  Anticoagulation Summary as of 4/30/2018     INR goal 2.0-3.0   Today's INR 2.1   Maintenance plan 5 mg (5 mg x 1) every day   Full instructions 5 mg every day   Weekly total 35 mg   No change documented Delia Garcia RN   Plan last modified Delia Garcia RN (1/7/2016)   Next INR check 6/11/2018   Target end date     Indications   Long-term (current) use of anticoagulants [Z79.01] [Z79.01]         Anticoagulation Episode Summary     INR check location     Preferred lab     Send INR reminders to RN POOL - LK    Comments             See the Encounter Report to view Anticoagulation Flowsheet and Dosing Calendar (Go to Encounters tab in chart review, and find the Anticoagulation Therapy Visit)        Delia Garcia RN

## 2018-05-24 ENCOUNTER — OFFICE VISIT (OUTPATIENT)
Dept: FAMILY MEDICINE | Facility: CLINIC | Age: 53
End: 2018-05-24
Payer: COMMERCIAL

## 2018-05-24 VITALS
BODY MASS INDEX: 39.79 KG/M2 | HEIGHT: 71 IN | SYSTOLIC BLOOD PRESSURE: 128 MMHG | OXYGEN SATURATION: 97 % | HEART RATE: 72 BPM | WEIGHT: 284.2 LBS | DIASTOLIC BLOOD PRESSURE: 76 MMHG | TEMPERATURE: 99.1 F

## 2018-05-24 DIAGNOSIS — I26.99 OTHER ACUTE PULMONARY EMBOLISM WITHOUT ACUTE COR PULMONALE (H): ICD-10-CM

## 2018-05-24 DIAGNOSIS — Z00.00 ROUTINE GENERAL MEDICAL EXAMINATION AT A HEALTH CARE FACILITY: Primary | ICD-10-CM

## 2018-05-24 DIAGNOSIS — Z12.11 COLON CANCER SCREENING: ICD-10-CM

## 2018-05-24 DIAGNOSIS — E78.5 HYPERLIPIDEMIA LDL GOAL <100: ICD-10-CM

## 2018-05-24 DIAGNOSIS — I10 HYPERTENSION GOAL BP (BLOOD PRESSURE) < 140/90: ICD-10-CM

## 2018-05-24 DIAGNOSIS — E11.9 TYPE 2 DIABETES MELLITUS WITHOUT COMPLICATION, WITHOUT LONG-TERM CURRENT USE OF INSULIN (H): ICD-10-CM

## 2018-05-24 LAB
ALT SERPL W P-5'-P-CCNC: 50 U/L (ref 0–70)
ANION GAP SERPL CALCULATED.3IONS-SCNC: 14 MMOL/L (ref 3–14)
BUN SERPL-MCNC: 20 MG/DL (ref 7–30)
CALCIUM SERPL-MCNC: 9 MG/DL (ref 8.5–10.1)
CHLORIDE SERPL-SCNC: 106 MMOL/L (ref 94–109)
CHOLEST SERPL-MCNC: 161 MG/DL
CO2 SERPL-SCNC: 18 MMOL/L (ref 20–32)
CREAT SERPL-MCNC: 1.07 MG/DL (ref 0.66–1.25)
CREAT UR-MCNC: 295 MG/DL
GFR SERPL CREATININE-BSD FRML MDRD: 73 ML/MIN/1.7M2
GLUCOSE SERPL-MCNC: 212 MG/DL (ref 70–99)
HBA1C MFR BLD: 8.9 % (ref 0–5.6)
HDLC SERPL-MCNC: 44 MG/DL
LDLC SERPL CALC-MCNC: 83 MG/DL
MICROALBUMIN UR-MCNC: 22 MG/L
MICROALBUMIN/CREAT UR: 7.42 MG/G CR (ref 0–17)
NONHDLC SERPL-MCNC: 117 MG/DL
POTASSIUM SERPL-SCNC: 4.1 MMOL/L (ref 3.4–5.3)
SODIUM SERPL-SCNC: 138 MMOL/L (ref 133–144)
TRIGL SERPL-MCNC: 172 MG/DL

## 2018-05-24 PROCEDURE — 83036 HEMOGLOBIN GLYCOSYLATED A1C: CPT | Performed by: FAMILY MEDICINE

## 2018-05-24 PROCEDURE — 36415 COLL VENOUS BLD VENIPUNCTURE: CPT | Performed by: FAMILY MEDICINE

## 2018-05-24 PROCEDURE — 80061 LIPID PANEL: CPT | Performed by: FAMILY MEDICINE

## 2018-05-24 PROCEDURE — 84460 ALANINE AMINO (ALT) (SGPT): CPT | Performed by: FAMILY MEDICINE

## 2018-05-24 PROCEDURE — 80048 BASIC METABOLIC PNL TOTAL CA: CPT | Performed by: FAMILY MEDICINE

## 2018-05-24 PROCEDURE — 82043 UR ALBUMIN QUANTITATIVE: CPT | Performed by: FAMILY MEDICINE

## 2018-05-24 PROCEDURE — 99396 PREV VISIT EST AGE 40-64: CPT | Performed by: FAMILY MEDICINE

## 2018-05-24 RX ORDER — SIMVASTATIN 20 MG
20 TABLET ORAL AT BEDTIME
Qty: 90 TABLET | Refills: 3 | Status: SHIPPED | OUTPATIENT
Start: 2018-05-24 | End: 2019-04-24

## 2018-05-24 RX ORDER — LISINOPRIL AND HYDROCHLOROTHIAZIDE 12.5; 2 MG/1; MG/1
TABLET ORAL
Qty: 180 TABLET | Refills: 3 | Status: SHIPPED | OUTPATIENT
Start: 2018-05-24 | End: 2019-04-24

## 2018-05-24 RX ORDER — ATENOLOL 50 MG/1
50 TABLET ORAL DAILY
Qty: 90 TABLET | Refills: 3 | Status: SHIPPED | OUTPATIENT
Start: 2018-05-24 | End: 2019-04-24

## 2018-05-24 RX ORDER — GLIPIZIDE 5 MG/1
5 TABLET, FILM COATED, EXTENDED RELEASE ORAL DAILY
Qty: 90 TABLET | Refills: 3 | Status: SHIPPED | OUTPATIENT
Start: 2018-05-24 | End: 2018-06-04

## 2018-05-24 NOTE — MR AVS SNAPSHOT
After Visit Summary   5/24/2018    Schuyler Castro    MRN: [id number]           Patient Information     Date Of Birth          1965        Visit Information        Provider Department      5/24/2018 9:00 AM Jaspal Moreno MD [facility]        Today's Diagnoses     Routine general medical examination at a health care facility    -  1    Type 2 diabetes mellitus without complication, without long-term current use of insulin (H)        Hyperlipidemia LDL goal <100        Hypertension goal BP (blood pressure) < 140/90        Other acute pulmonary embolism without acute cor pulmonale (H)        Colon cancer screening          Care Instructions      Preventive Health Recommendations  Male Ages 50 - 64    Yearly exam:             See your health care provider every year in order to  o   Review health changes.   o   Discuss preventive care.    o   Review your medicines if your doctor has prescribed any.     Have a cholesterol test every 5 years, or more frequently if you are at risk for high cholesterol/heart disease.     Have a diabetes test (fasting glucose) every three years. If you are at risk for diabetes, you should have this test more often.     Have a colonoscopy at age 50, or have a yearly FIT test (stool test). These exams will check for colon cancer.      Talk with your health care provider about whether or not a prostate cancer screening test (PSA) is right for you.    You should be tested each year for STDs (sexually transmitted diseases), if you re at risk.     Shots: Get a flu shot each year. Get a tetanus shot every 10 years.     Nutrition:    Eat at least 5 servings of fruits and vegetables daily.     Eat whole-grain bread, whole-wheat pasta and brown rice instead of white grains and rice.     Talk to your provider about Calcium and Vitamin D.     Lifestyle    Exercise for at least 150 minutes a week (30 minutes a day, 5 days a week). This will help you control your  weight and prevent disease.     Limit alcohol to one drink per day.     No smoking.     Wear sunscreen to prevent skin cancer.     See your dentist every six months for an exam and cleaning.     See your eye doctor every 1 to 2 years.            Follow-ups after your visit        Additional Services     GASTROENTEROLOGY ADULT REF PROCEDURE ONLY Karoline Rebollar [phone]; No Provider Preference       Last Lab Result: Creatinine (mg/dL)       Date                     Value                 01/23/2017               1.16             ----------  Body mass index is 40.2 kg/(m^2).     Needed:  No  Language:  English    Patient will be contacted to schedule procedure.     Please be aware that coverage of these services is subject to the terms and limitations of your health insurance plan.  Call member services at your health plan with any benefit or coverage questions.  Any procedures must be performed at a [facility] facility OR coordinated by your clinic's referral office.    Please bring the following with you to your appointment:    (1) Any X-Rays, CTs or MRIs which have been performed.  Contact the facility where they were done to arrange for  prior to your scheduled appointment.    (2) List of current medications   (3) This referral request   (4) Any documents/labs given to you for this referral                  Your next 10 appointments already scheduled     Jun 11, 2018  8:45 AM CDT   Anticoagulation Visit with  ANTICOAGULATION CLINIC   [facility]    [address] [address] [phone]              Who to contact     If you have questions or need follow up information about today's clinic visit or your schedule please contact [facility] directly at [phone].  Normal or non-critical lab and imaging results will be communicated to you by MyChart, letter or phone within 4 business days after the clinic has  "received the results. If you do not hear from us within 7 days, please contact the clinic through [employer] or phone. If you have a critical or abnormal lab result, we will notify you by phone as soon as possible.  Submit refill requests through [employer] or call your pharmacy and they will forward the refill request to us. Please allow 3 business days for your refill to be completed.          Additional Information About Your Visit        [employer] Information     [employer] gives you secure access to your electronic health record. If you see a primary care provider, you can also send messages to your care team and make appointments. If you have questions, please call your primary care clinic.  If you do not have a primary care provider, please call [phone] and they will assist you.        Care EveryWhere ID     This is your Care EveryWhere ID. This could be used by other organizations to access your [facility] medical records  [id number]        Your Vitals Were     Pulse Temperature Height Pulse Oximetry BMI (Body Mass Index)       72 99.1  F (37.3  C) (Oral) 5' 10.5\" (1.791 m) 97% 40.2 kg/m2        Blood Pressure from Last 3 Encounters:   05/24/18 128/76   01/23/17 136/80   11/03/15 124/72    Weight from Last 3 Encounters:   05/24/18 284 lb 3.2 oz (128.9 kg)   01/23/17 284 lb (128.8 kg)   11/03/15 281 lb (127.5 kg)              We Performed the Following     Albumin Random Urine Quantitative with Creat Ratio     ALT     Basic metabolic panel     GASTROENTEROLOGY ADULT REF PROCEDURE ONLY Karoline Rebollar [phone]; No Provider Preference     Hemoglobin A1c     Lipid panel reflex to direct LDL Fasting          Today's Medication Changes          These changes are accurate as of 5/24/18  9:32 AM.  If you have any questions, ask your nurse or doctor.               These medicines have changed or have updated prescriptions.        Dose/Directions    atenolol 50 MG tablet   Commonly known as:  TENORMIN   This may have " changed:  See the new instructions.   Used for:  Hypertension goal BP (blood pressure) < 140/90   Changed by:  Jaspal Moerno MD        Dose:  50 mg   Take 1 tablet (50 mg) by mouth daily   Quantity:  90 tablet   Refills:  3       glipiZIDE 5 MG 24 hr tablet   Commonly known as:  GLUCOTROL XL   This may have changed:  See the new instructions.   Used for:  Type 2 diabetes mellitus without complication, without long-term current use of insulin (H)   Changed by:  Jaspal Moreno MD        Dose:  5 mg   Take 1 tablet (5 mg) by mouth daily   Quantity:  90 tablet   Refills:  3       simvastatin 20 MG tablet   Commonly known as:  ZOCOR   This may have changed:  See the new instructions.   Used for:  Hyperlipidemia LDL goal <100   Changed by:  Jaspal Moreno MD        Dose:  20 mg   Take 1 tablet (20 mg) by mouth At Bedtime   Quantity:  90 tablet   Refills:  3            Where to get your medicines      These medications were sent to [place] [id number] [place] [id number]    Hours:  Tech issues with their phone system Phone:  [phone]     atenolol 50 MG tablet    glipiZIDE 5 MG 24 hr tablet    lisinopril-hydrochlorothiazide 20-12.5 MG per tablet    metFORMIN 500 MG tablet    simvastatin 20 MG tablet                Primary Care Provider Office Phone # Fax #    Jaspal Moreno [phone] [address] [employer] [address]        Equal Access to Services     [facility] AH: Hadii jenelle ku hadasho Somandiali, waaxda luqadaha, qaybta kaalmada adeegyada, eliceo tobar. So [facility] [phone].    ATENCIÓN: Si habla español, tiene a swanson disposición servicios gratuitos de asistencia lingüística. Llame al [phone].    We comply with applicable federal civil rights laws and [place] laws. We do not discriminate on the basis of race, color, national origin, age, disability, sex, sexual orientation, or  gender identity.            Thank you!     Thank you for choosing [facility]  for your care. Our goal is always to provide you with excellent care. Hearing back from our patients is one way we can continue to improve our services. Please take a few minutes to complete the written survey that you may receive in the mail after your visit with us. Thank you!             Your Updated Medication List - Protect others around you: Learn how to safely use, store and throw away your medicines at www.disposemymeds.org.          This list is accurate as of 5/24/18  9:32 AM.  Always use your most recent med list.                   Brand Name Dispense Instructions for use Diagnosis    aspirin 81 MG tablet     30 tablet    Take 1 tablet (81 mg) by mouth daily    Personal history of DVT (deep vein thrombosis), History of pulmonary embolism, Anticoagulation monitoring, INR range 2-3       atenolol 50 MG tablet    TENORMIN    90 tablet    Take 1 tablet (50 mg) by mouth daily    Hypertension goal BP (blood pressure) < 140/90       blood glucose monitoring meter device kit    no brand specified    1 kit    Use to test blood sugars 1-2 times daily or as directed.    Type 2 diabetes mellitus without complication (H)       blood glucose monitoring test strip    no brand specified    100 strip    Use to test blood sugars 1-2 times daily or as directed    Type 2 diabetes mellitus without complication, without long-term current use of insulin (H)       glipiZIDE 5 MG 24 hr tablet    GLUCOTROL XL    90 tablet    Take 1 tablet (5 mg) by mouth daily    Type 2 diabetes mellitus without complication, without long-term current use of insulin (H)       lisinopril-hydrochlorothiazide 20-12.5 MG per tablet    PRINZIDE/ZESTORETIC    180 tablet    TAKE 2 TABLETS BY MOUTH DAILY    Hypertension goal BP (blood pressure) < 140/90       metFORMIN 500 MG tablet    GLUCOPHAGE    360 tablet    TAKE 2 TABLETS BY MOUTH TWICE A DAY WITH MEALS     Type 2 diabetes mellitus without complication, without long-term current use of insulin (H)       simvastatin 20 MG tablet    ZOCOR    90 tablet    Take 1 tablet (20 mg) by mouth At Bedtime    Hyperlipidemia LDL goal <100       TYLENOL PO      Take 650 mg by mouth        * warfarin 5 MG tablet    COUMADIN    90 tablet    Take 5 mg daily    Personal history of DVT (deep vein thrombosis), History of pulmonary embolism, Anticoagulation monitoring, INR range 2-3       * warfarin 5 MG tablet    COUMADIN    90 tablet    5 mg daily or as directed by INR clinic    Long-term (current) use of anticoagulants       * Notice:  This list has 2 medication(s) that are the same as other medications prescribed for you. Read the directions carefully, and ask your doctor or other care provider to review them with you.

## 2018-05-24 NOTE — PROGRESS NOTES
SUBJECTIVE:   CC: Schuyler Castro is an 52 year old male who presents for preventative health visit.     Physical   Annual:     Getting at least 3 servings of Calcium per day::  Yes    Bi-annual eye exam::  NO    Dental care twice a year::  NO    Sleep apnea or symptoms of sleep apnea::  None    Diet::  Diabetic    Taking medications regularly::  Yes    Medication side effects::  None    Additional concerns today::  No            History of diabetes type II without complication, diagnosed in 2002. Stable on metformin and glipizide 5 mg daily.    History of hyperlipidemia, on statin. Denies myalgia.     History of hypertension currently well controlled on thiazide diuretic, B- blocker, and ACE inhibitor.  Denies shortness or breath, chest pain, headache, vision change, or lower extremity edema.     On Coumadin for previous extensive PE and DVT with one occurrence in the past in 2010 but recommended to continue with extensive PE.    Today's PHQ-2 Score:   PHQ-2 ( 1999 [employer]) 5/24/2018   Q1: Little interest or pleasure in doing things 0   Q2: Feeling down, depressed or hopeless 0   PHQ-2 Score 0   Q1: Little interest or pleasure in doing things Not at all   Q2: Feeling down, depressed or hopeless Not at all   PHQ-2 Score 0       Abuse: Current or Past(Physical, Sexual or Emotional)- No  Do you feel safe in your environment - Yes    Social History   Substance Use Topics     Smoking status: Never Smoker     Smokeless tobacco: Never Used     Alcohol use 0.0 - 0.6 oz/week     0 - 1 Standard drinks or equivalent per week     Alcohol Use 5/24/2018   If you drink alcohol do you typically have greater than 3 drinks per day OR greater than 7 drinks per week? Not Applicable   No flowsheet data found.    Last PSA:   PSA   Date Value Ref Range Status   08/24/2010 0.92 0 - 4 ug/L Final       Reviewed orders with patient. Reviewed health maintenance and updated orders accordingly - Yes  Patient Active Problem List   Diagnosis      Personal history of DVT (deep vein thrombosis)     Hyperlipidemia LDL goal <100     Hypertension goal BP (blood pressure) < 140/90     Type 2 diabetes mellitus without complication (H)     Long-term (current) use of anticoagulants [Z79.01]     Other acute pulmonary embolism without acute cor pulmonale (H)     History reviewed. No pertinent surgical history.    Social History   Substance Use Topics     Smoking status: Never Smoker     Smokeless tobacco: Never Used     Alcohol use 0.0 - 0.6 oz/week     0 - 1 Standard drinks or equivalent per week     Family History   Problem Relation Age of Onset     Neurologic Disorder Mother      mysthenia gravis     Dementia Father            Reviewed and updated as needed this visit by clinical staff  Tobacco  Allergies  Meds  Med Hx  Surg Hx  Fam Hx  Soc Hx        Reviewed and updated as needed this visit by Provider        Past Medical History:   Diagnosis Date     Diabetes mellitus type 2, noninsulin dependent (H) 2002     DVT of deep femoral vein (H) 8/2010     Hypertension 1996     Pulmonary embolus (H) 8/2010      History reviewed. No pertinent surgical history.    Review of Systems  CONSTITUTIONAL: NEGATIVE for fever, chills, change in weight  INTEGUMENTARY/SKIN: NEGATIVE for worrisome rashes, moles or lesions  EYES: NEGATIVE for vision changes or irritation  ENT: NEGATIVE for ear, mouth and throat problems  RESP: NEGATIVE for significant cough or SOB  CV: NEGATIVE for chest pain, palpitations or peripheral edema  GI: NEGATIVE for nausea, abdominal pain, heartburn, or change in bowel habits   male: negative for dysuria, hematuria, decreased urinary stream, erectile dysfunction, urethral discharge  MUSCULOSKELETAL: NEGATIVE for significant arthralgias or myalgia  NEURO: NEGATIVE for weakness, dizziness or paresthesias  PSYCHIATRIC: NEGATIVE for changes in mood or affect    This document serves as a record of the services and decisions personally performed and made by  "Jaspal Moreno MD. It was created on his behalf by Stephanie Michael, a trained medical scribe. The creation of this document is based on the provider's statements to the medical scribe.  Stephanie Michael 9:17 AM May 24, 2018    OBJECTIVE:   /76 (BP Location: Right arm, Patient Position: Chair, Cuff Size: Adult Regular)  Pulse 72  Temp 99.1  F (37.3  C) (Oral)  Ht 1.791 m (5' 10.5\")  Wt 128.9 kg (284 lb 3.2 oz)  SpO2 97%  BMI 40.2 kg/m2    Physical Exam  GENERAL: healthy, alert and no distress  EYES: Eyes grossly normal to inspection, PERRL and conjunctivae and sclerae normal  HENT: ear canals and TM's normal, nose and mouth without ulcers or lesions  NECK: no adenopathy, no asymmetry, masses, or scars and thyroid normal to palpation  RESP: lungs clear to auscultation - no rales, rhonchi or wheezes  CV: regular rate and rhythm, normal S1 S2, no S3 or S4, no murmur, click or rub, no peripheral edema and peripheral pulses strong  ABDOMEN: soft, nontender, no hepatosplenomegaly, no masses and bowel sounds normal   (male): normal male genitalia without lesions or urethral discharge, no hernia  MS: no gross musculoskeletal defects noted, no edema  SKIN: no suspicious lesions or rashes  NEURO: Normal strength and tone, mentation intact and speech normal  PSYCH: mentation appears normal, affect normal/bright    ASSESSMENT/PLAN:   1. Routine general medical examination at a health care facility    2. Type 2 diabetes mellitus without complication, without long-term current use of insulin (H)  - Hemoglobin A1c  - Albumin Random Urine Quantitative with Creat Ratio  - Lipid panel reflex to direct LDL Fasting  - Basic metabolic panel  - ALT  - glipiZIDE (GLUCOTROL XL) 5 MG 24 hr tablet; Take 1 tablet (5 mg) by mouth daily  Dispense: 90 tablet; Refill: 3  - metFORMIN (GLUCOPHAGE) 500 MG tablet; TAKE 2 TABLETS BY MOUTH TWICE A DAY WITH MEALS  Dispense: 360 tablet; Refill: 3    3. Hyperlipidemia LDL goal <100  - simvastatin " "(ZOCOR) 20 MG tablet; Take 1 tablet (20 mg) by mouth At Bedtime  Dispense: 90 tablet; Refill: 3    4. Hypertension goal BP (blood pressure) < 140/90  - atenolol (TENORMIN) 50 MG tablet; Take 1 tablet (50 mg) by mouth daily  Dispense: 90 tablet; Refill: 3  - lisinopril-hydrochlorothiazide (PRINZIDE/ZESTORETIC) 20-12.5 MG per tablet; TAKE 2 TABLETS BY MOUTH DAILY  Dispense: 180 tablet; Refill: 3    5. Other acute pulmonary embolism without acute cor pulmonale (H)    6. Colon cancer screening  - GASTROENTEROLOGY ADULT REF PROCEDURE ONLY Karoline Rebollar [phone]; No Provider Preference    COUNSELING:   Reviewed preventive health counseling, as reflected in patient instructions  Special attention given to:        Regular exercise       Healthy diet/nutrition       HIV screeninx in teen years, 1x in adult years, and at intervals if high risk       Colon cancer screening     reports that he has never smoked. He has never used smokeless tobacco.    Estimated body mass index is 40.2 kg/(m^2) as calculated from the following:    Height as of this encounter: 1.791 m (5' 10.5\").    Weight as of this encounter: 128.9 kg (284 lb 3.2 oz).   Weight management plan: Discussed healthy diet and exercise guidelines and patient will follow up in 12 months in clinic to re-evaluate.    Counseling Resources:  ATP IV Guidelines  Pooled Cohorts Equation Calculator  FRAX Risk Assessment  ICSI Preventive Guidelines  Dietary Guidelines for Americans,   USDA's MyPlate  ASA Prophylaxis  Lung CA Screening    The information in this document, created by the medical scribe for me, accurately reflects the services I personally performed and the decisions made by me. I have reviewed and approved this document for accuracy prior to leaving the patient care area.  May 24, 2018 9:31 AM    Jaspal Moreno MD  [facility]  Answers for HPI/ROS submitted by the patient on 2018   PHQ-2 Score: 0    "

## 2018-05-25 PROBLEM — E66.01 MORBID OBESITY (H): Status: ACTIVE | Noted: 2018-05-25

## 2018-05-31 DIAGNOSIS — Z79.01 LONG-TERM (CURRENT) USE OF ANTICOAGULANTS: ICD-10-CM

## 2018-06-01 RX ORDER — WARFARIN SODIUM 5 MG/1
TABLET ORAL
Qty: 90 TABLET | Refills: 0 | Status: SHIPPED | OUTPATIENT
Start: 2018-06-01 | End: 2018-08-26

## 2018-06-01 NOTE — TELEPHONE ENCOUNTER
Prescription approved per [facility] Refill Protocol.  Anahi Garcia RN, BSN

## 2018-06-01 NOTE — TELEPHONE ENCOUNTER
"Requested Prescriptions   Pending Prescriptions Disp Refills     warfarin (COUMADIN) 5 MG tablet [Pharmacy Med Name: WARFARIN SODIUM 5 MG TABLET]  Last Written Prescription Date:  2017  Last Fill Quantity: 90 tablet,  # refills: 1   Last office visit: 2018 with prescribing provider:  Josh    90 tablet 1     Si MG DAILY OR AS DIRECTED BY INR CLINIC    Vitamin K Antagonists Failed    2018  6:54 PM       Failed - INR is within goal in the past 6 weeks    Confirm INR is within goal in the past 6 weeks.     Recent Labs   Lab Test 18   INR  2.1*            Passed - Recent (12 mo) or future (30 days) visit within the authorizing provider's specialty    Patient had office visit in the last 12 months or has a visit in the next 30 days with authorizing provider or within the authorizing provider's specialty.  See \"Patient Info\" tab in inbasket, or \"Choose Columns\" in Meds & Orders section of the refill encounter.           Passed - Patient is 18 years of age or older          "

## 2018-06-04 ENCOUNTER — TELEPHONE (OUTPATIENT)
Dept: FAMILY MEDICINE | Facility: CLINIC | Age: 53
End: 2018-06-04

## 2018-06-04 DIAGNOSIS — E11.9 TYPE 2 DIABETES MELLITUS WITHOUT COMPLICATION, WITHOUT LONG-TERM CURRENT USE OF INSULIN (H): Primary | ICD-10-CM

## 2018-06-04 RX ORDER — GLIPIZIDE 10 MG/1
10 TABLET, FILM COATED, EXTENDED RELEASE ORAL DAILY
Qty: 90 TABLET | Refills: 0 | Status: SHIPPED | OUTPATIENT
Start: 2018-06-04 | End: 2018-08-30

## 2018-06-04 NOTE — TELEPHONE ENCOUNTER
Patient with elevated A1c at physical - DM uncontrolled.    Called and LM.    Recommend increase glipizide XL to 10 mg daily.  Recommend diet and activity improvements and follow-up in 3 months for recheck A1c - please schedule if able.    Sent 10 mg tabs to pharmacy but can take 2 of 5 mg tabs if he has them until out.    A1c of 8.9 quite high so unless diet and exercise changes then may need 3rd oral agent or injectable med as well.

## 2018-07-20 ENCOUNTER — ANTICOAGULATION THERAPY VISIT (OUTPATIENT)
Dept: NURSING | Facility: CLINIC | Age: 53
End: 2018-07-20
Payer: COMMERCIAL

## 2018-07-20 DIAGNOSIS — Z79.01 LONG-TERM (CURRENT) USE OF ANTICOAGULANTS: ICD-10-CM

## 2018-07-20 LAB — INR POINT OF CARE: 1.9 (ref 0.86–1.14)

## 2018-07-20 PROCEDURE — 99207 ZZC NO CHARGE NURSE ONLY: CPT

## 2018-07-20 PROCEDURE — 36416 COLLJ CAPILLARY BLOOD SPEC: CPT

## 2018-07-20 PROCEDURE — 85610 PROTHROMBIN TIME: CPT | Mod: QW

## 2018-07-20 NOTE — PROGRESS NOTES
ANTICOAGULATION FOLLOW-UP CLINIC VISIT    Patient Name:  Schuyler Castro  Date:  7/20/2018  Contact Type:  Face to Face    SUBJECTIVE:     Patient Findings     Positives Change in diet/appetite    Comments Extra greens in the last week           OBJECTIVE    INR Protime   Date Value Ref Range Status   07/20/2018 1.9 (A) 0.86 - 1.14 Final       ASSESSMENT / PLAN  No question data found.  Anticoagulation Summary as of 7/20/2018     INR goal 2.0-3.0   Today's INR 1.9!   Warfarin maintenance plan 5 mg (5 mg x 1) every day   Full warfarin instructions 5 mg every day   Weekly warfarin total 35 mg   No change documented Delia Garcia RN   Plan last modified Delia Garcia RN (1/7/2016)   Next INR check 8/31/2018   Target end date     Indications   Long-term (current) use of anticoagulants [Z79.01] [Z79.01]         Anticoagulation Episode Summary     INR check location     Preferred lab     Send INR reminders to RN POOL - JORGE    Comments             See the Encounter Report to view Anticoagulation Flowsheet and Dosing Calendar (Go to Encounters tab in chart review, and find the Anticoagulation Therapy Visit)        Delia Garcia RN

## 2018-07-20 NOTE — MR AVS SNAPSHOT
Schuyler Castro   7/20/2018 9:00 AM   Anticoagulation Therapy Visit    Description:  52 year old male   Provider:   ANTICOAGULATION CLINIC   Department:   Nurse           INR as of 7/20/2018     Today's INR 1.9!      Anticoagulation Summary as of 7/20/2018     INR goal 2.0-3.0   Today's INR 1.9!   Full warfarin instructions 5 mg every day   Next INR check 8/31/2018    Indications   Long-term (current) use of anticoagulants [Z79.01] [Z79.01]         Your next Anticoagulation Clinic appointment(s)     Jul 20, 2018  9:00 AM CDT   Anticoagulation Visit with  ANTICOAGULATION CLINIC   [facility] ([facility]    [address] [id number] [phone]            Aug 31, 2018  8:30 AM CDT   Anticoagulation Visit with  ANTICOAGULATION CLINIC   [facility] ([facility])    [address] [id number] [phone]              Contact Numbers     [facility]  Please call [phone] with any problems or questions regarding your therapy, or to cancel or reschedule your appointment.          July 2018 Details    Sun Mon Tue Wed Thu Fri Sat     1               2               3               4               5               6               7                 8               9               10               11               12               13               14                 15               16               17               18               19               20      5 mg   See details      21      5 mg           22      5 mg         23      5 mg         24      5 mg         25      5 mg         26      5 mg         27      5 mg         28      5 mg           29      5 mg         30      5 mg         31      5 mg              Date Details   07/20 This INR check               How to take your warfarin dose     To take:  5 mg Take 1 of the 5 mg tablets.           August 2018 Details    Sun Mon Tue Wed Thu Fri Sat         1      5 mg         2      5 mg         3      5 mg         4      5 mg           5      5 mg         6      5 mg         7      5 mg         8      5 mg         9      5 mg         10      5 mg         11      5 mg           12      5 mg         13      5 mg         14      5 mg         15      5 mg         16      5 mg         17      5 mg         18      5 mg           19      5 mg         20      5 mg         21      5 mg         22      5 mg         23      5 mg         24      5 mg         25      5 mg           26      5 mg         27      5 mg         28      5 mg         29      5 mg         30      5 mg         31              Date Details   No additional details    Date of next INR:  8/31/2018         How to take your warfarin dose     To take:  5 mg Take 1 of the 5 mg tablets.

## 2018-08-26 DIAGNOSIS — Z79.01 LONG-TERM (CURRENT) USE OF ANTICOAGULANTS: ICD-10-CM

## 2018-08-27 RX ORDER — WARFARIN SODIUM 5 MG/1
TABLET ORAL
Qty: 90 TABLET | Refills: 0 | Status: SHIPPED | OUTPATIENT
Start: 2018-08-27 | End: 2018-11-21

## 2018-08-27 NOTE — TELEPHONE ENCOUNTER
"Requested Prescriptions   Pending Prescriptions Disp Refills     warfarin (COUMADIN) 5 MG tablet [Pharmacy Med Name: WARFARIN SODIUM 5 MG TABLET] 90 tablet 0    Last Written Prescription Date:  06/01/2018  Last Fill Quantity: 90 tablet,  # refills: 0   Last office visit: 5/24/2018 with prescribing provider:  05/24/2018   Future Office Visit:   Next 5 appointments (look out 90 days)     Sep 28, 2018  8:40 AM CDT   Office Visit with Jaspal Moreno MD   [facility] ([facility]    [address] [address] [phone]                  Sig: TAKE 1 TABLET BY MOUTH DAILY OR AS DIRECTED BY INR CLINIC    Vitamin K Antagonists Failed    8/26/2018  4:03 AM       Failed - INR is within goal in the past 6 weeks    Confirm INR is within goal in the past 6 weeks.     Recent Labs   Lab Test 07/20/18   INR  1.9*                      Passed - Recent (12 mo) or future (30 days) visit within the authorizing provider's specialty    Patient had office visit in the last 12 months or has a visit in the next 30 days with authorizing provider or within the authorizing provider's specialty.  See \"Patient Info\" tab in inbasket, or \"Choose Columns\" in Meds & Orders section of the refill encounter.           Passed - Patient is 18 years of age or older          Lázaro JACOME  "

## 2018-08-27 NOTE — TELEPHONE ENCOUNTER
Prescription approved per [facility] Refill Protocol.  Anahi Garcia RN, BSN

## 2018-08-30 DIAGNOSIS — E11.9 TYPE 2 DIABETES MELLITUS WITHOUT COMPLICATION, WITHOUT LONG-TERM CURRENT USE OF INSULIN (H): ICD-10-CM

## 2018-08-30 RX ORDER — GLIPIZIDE 10 MG/1
TABLET, FILM COATED, EXTENDED RELEASE ORAL
Qty: 90 TABLET | Refills: 0 | Status: SHIPPED | OUTPATIENT
Start: 2018-08-30 | End: 2018-12-01

## 2018-08-30 NOTE — TELEPHONE ENCOUNTER
"Requested Prescriptions   Pending Prescriptions Disp Refills     glipiZIDE (GLUCOTROL XL) 10 MG 24 hr tablet [Pharmacy Med Name: GLIPIZIDE ER 10 MG TABLET] 90 tablet 0    Last Written Prescription Date:  06/04/2018  Last Fill Quantity: 90 tablet,  # refills: 0   Last office visit: 5/24/2018 with prescribing provider:  05/24/2018   Future Office Visit:   Next 5 appointments (look out 90 days)     Sep 28, 2018  8:40 AM CDT   Office Visit with Jaspal Moreno MD   [facility] ([facility])    [address] [address] [phone]                  Sig: TAKE 1 TABLET BY MOUTH EVERY DAY    Sulfonylurea Agents Failed    8/30/2018  1:50 AM       Failed - Patient has documented A1c within the specified period of time.    If HgbA1C is 8 or greater, it needs to be on file within the past 3 months.  If less than 8, must be on file within the past 6 months.     Recent Labs   Lab Test  05/24/18   0935   A1C  8.9*            Passed - Blood pressure less than 140/90 in past 6 months    BP Readings from Last 3 Encounters:   05/24/18 128/76   01/23/17 136/80   11/03/15 124/72                Passed - Patient has documented LDL within the past 12 mos.    Recent Labs   Lab Test  05/24/18   0935   LDL  83            Passed - Patient has had a Microalbumin in the past 12 mos.    Recent Labs   Lab Test  05/24/18   0811   MICROL  22   UMALCR  7.42            Passed - Patient is age 18 or older       Passed - Patient has a recent creatinine (normal) within the past 12 mos.    Recent Labs   Lab Test  05/24/18   0935   CR  1.07            Passed - Recent (6 mo) or future (30 days) visit within the authorizing provider's specialty    Patient had office visit in the last 6 months or has a visit in the next 30 days with authorizing provider or within the authorizing provider's specialty.  See \"Patient Info\" tab in inbasket, or \"Choose Columns\" in Meds & Orders section of the refill encounter.  "             Lázaro Doty XRT

## 2018-10-01 ENCOUNTER — ANTICOAGULATION THERAPY VISIT (OUTPATIENT)
Dept: NURSING | Facility: CLINIC | Age: 53
End: 2018-10-01
Payer: COMMERCIAL

## 2018-10-01 DIAGNOSIS — Z79.01 LONG-TERM (CURRENT) USE OF ANTICOAGULANTS: ICD-10-CM

## 2018-10-01 LAB — INR POINT OF CARE: 2.5 (ref 0.86–1.14)

## 2018-10-01 PROCEDURE — 36416 COLLJ CAPILLARY BLOOD SPEC: CPT

## 2018-10-01 PROCEDURE — 85610 PROTHROMBIN TIME: CPT | Mod: QW

## 2018-10-01 NOTE — MR AVS SNAPSHOT
After Visit Summary   10/1/2018    Schuyler Castro    MRN: [id number]           Patient Information     Date Of Birth          1965        Visit Information        Provider Department      10/1/2018 8:30 AM [facility] ANTICOAGULATION CLINIC [facility]        Today's Diagnoses     Long-term (current) use of anticoagulants           Follow-ups after your visit        Your next 10 appointments already scheduled     Oct 01, 2018  8:30 AM CDT   Anticoagulation Visit with [facility] ANTICOAGULATION CLINIC   [facility]    [address] [address] [phone]            Nov 09, 2018  8:40 AM CST   [facility]    [address] [address] [phone]           Bring a current list of meds and any records pertaining to this visit. For Physicals, please bring immunization records and any forms needing to be filled out. Please arrive 10 minutes early to complete paperwork.              Who to contact     If you have questions or need follow up information about today's clinic visit or your schedule please contact [facility] directly at [phone].  Normal or non-critical lab and imaging results will be communicated to you by MyChart, letter or phone within 4 business days after the clinic has received the results. If you do not hear from us within 7 days, please contact the clinic through [employer] or phone. If you have a critical or abnormal lab result, we will notify you by phone as soon as possible.  Submit refill requests through [employer] or call your pharmacy and they will forward the refill request to us. Please allow 3 business days for your refill to be completed.          Additional Information About Your Visit        MyChart Information     [employer] gives you secure access to your electronic health record. If you  see a primary care provider, you can also send messages to your care team and make appointments. If you have questions, please call your primary care clinic.  If you do not have a primary care provider, please call [phone] and they will assist you.        Care EveryWhere ID     This is your Care EveryWhere ID. This could be used by other organizations to access your [facility] medical records  [id number]         Blood Pressure from Last 3 Encounters:   05/24/18 128/76   01/23/17 136/80   11/03/15 124/72    Weight from Last 3 Encounters:   05/24/18 284 lb 3.2 oz (128.9 kg)   01/23/17 284 lb (128.8 kg)   11/03/15 281 lb (127.5 kg)              Today, you had the following     No orders found for display       Primary Care Provider Office Phone # Fax #    Jaspal Moreno [phone] [address] [facility] [address]        Equal Access to Services     MARISSA MARTÍNEZ : Hadii aad ku hadasho Soomaali, waaxda luqadaha, qaybta kaalmada adeegyada, waxay idiin haymarcon zinaeg kharablade lentz . So [facility] [phone].    ATENCIÓN: Si habla español, tiene a swanson disposición servicios gratuitos de asistencia lingüística. Llame al [phone].    We comply with applicable federal civil rights laws and [place] laws. We do not discriminate on the basis of race, color, national origin, age, disability, sex, sexual orientation, or gender identity.            Thank you!     Thank you for choosing [facility]  for your care. Our goal is always to provide you with excellent care. Hearing back from our patients is one way we can continue to improve our services. Please take a few minutes to complete the written survey that you may receive in the mail after your visit with us. Thank you!             Your Updated Medication List - Protect others around you: Learn how to safely use, store and throw away your medicines at www.disposemymeds.org.          This list is accurate as of 10/1/18  8:28 AM.   Always use your most recent med list.                   Brand Name Dispense Instructions for use Diagnosis    aspirin 81 MG tablet     30 tablet    Take 1 tablet (81 mg) by mouth daily    Personal history of DVT (deep vein thrombosis), History of pulmonary embolism, Anticoagulation monitoring, INR range 2-3       atenolol 50 MG tablet    TENORMIN    90 tablet    Take 1 tablet (50 mg) by mouth daily    Hypertension goal BP (blood pressure) < 140/90       blood glucose monitoring meter device kit    no brand specified    1 kit    Use to test blood sugars 1-2 times daily or as directed.    Type 2 diabetes mellitus without complication (H)       blood glucose monitoring test strip    no brand specified    100 strip    Use to test blood sugars 1-2 times daily or as directed    Type 2 diabetes mellitus without complication, without long-term current use of insulin (H)       glipiZIDE 10 MG 24 hr tablet    GLUCOTROL XL    90 tablet    TAKE 1 TABLET BY MOUTH EVERY DAY    Type 2 diabetes mellitus without complication, without long-term current use of insulin (H)       lisinopril-hydrochlorothiazide 20-12.5 MG per tablet    PRINZIDE/ZESTORETIC    180 tablet    TAKE 2 TABLETS BY MOUTH DAILY    Hypertension goal BP (blood pressure) < 140/90       metFORMIN 500 MG tablet    GLUCOPHAGE    360 tablet    TAKE 2 TABLETS BY MOUTH TWICE A DAY WITH MEALS    Type 2 diabetes mellitus without complication, without long-term current use of insulin (H)       simvastatin 20 MG tablet    ZOCOR    90 tablet    Take 1 tablet (20 mg) by mouth At Bedtime    Hyperlipidemia LDL goal <100       TYLENOL PO      Take 650 mg by mouth        * warfarin 5 MG tablet    COUMADIN    90 tablet    Take 5 mg daily    Personal history of DVT (deep vein thrombosis), History of pulmonary embolism, Anticoagulation monitoring, INR range 2-3       * warfarin 5 MG tablet    COUMADIN    90 tablet    TAKE 1 TABLET BY MOUTH DAILY OR AS DIRECTED BY INR CLINIC     Long-term (current) use of anticoagulants       * Notice:  This list has 2 medication(s) that are the same as other medications prescribed for you. Read the directions carefully, and ask your doctor or other care provider to review them with you.

## 2018-10-01 NOTE — PROGRESS NOTES
ANTICOAGULATION FOLLOW-UP CLINIC VISIT    Patient Name:  Schuyler Castro  Date:  10/1/2018  Contact Type:  Face to Face    SUBJECTIVE:     Patient Findings     Positives No Problem Findings           OBJECTIVE    INR Protime   Date Value Ref Range Status   07/20/2018 1.9 (A) 0.86 - 1.14 Final       ASSESSMENT / PLAN  No question data found.  Anticoagulation Summary as of 10/1/2018     INR goal 2.0-3.0   Today's INR    Warfarin maintenance plan 5 mg (5 mg x 1) every day   Full warfarin instructions 5 mg every day   Weekly warfarin total 35 mg   No change documented Anette Magana RN   Plan last modified Delia Garcia RN (1/7/2016)   Next INR check 11/9/2018   Target end date     Indications   Long-term (current) use of anticoagulants [Z79.01] [Z79.01]         Anticoagulation Episode Summary     INR check location     Preferred lab     Send INR reminders to RN POOL -     Comments             See the Encounter Report to view Anticoagulation Flowsheet and Dosing Calendar (Go to Encounters tab in chart review, and find the Anticoagulation Therapy Visit)        Anette Magana RN

## 2018-10-01 NOTE — MR AVS SNAPSHOT
Schuyler Castro   10/1/2018 8:30 AM   Anticoagulation Therapy Visit    Description:  53 year old male   Provider:   ANTICOAGULATION CLINIC   Department:   Nurse           INR as of 10/1/2018     Today's INR       Anticoagulation Summary as of 10/1/2018     INR goal 2.0-3.0   Today's INR    Full warfarin instructions 5 mg every day   Next INR check 11/9/2018    Indications   Long-term (current) use of anticoagulants [Z79.01] [Z79.01]         Your next Anticoagulation Clinic appointment(s)     Oct 01, 2018  8:30 AM CDT   Anticoagulation Visit with  ANTICOAGULATION CLINIC   [facility]    [address] [address] [phone]              Contact Numbers     [facility]  Please call [phone] with any problems or questions regarding your therapy, or to cancel or reschedule your appointment.          October 2018 Details    Sun Mon Tue Wed Thu Fri Sat      1      5 mg   See details      2      5 mg         3      5 mg         4      5 mg         5      5 mg         6      5 mg           7      5 mg         8      5 mg         9      5 mg         10      5 mg         11      5 mg         12      5 mg         13      5 mg           14      5 mg         15      5 mg         16      5 mg         17      5 mg         18      5 mg         19      5 mg         20      5 mg           21      5 mg         22      5 mg         23      5 mg         24      5 mg         25      5 mg         26      5 mg         27      5 mg           28      5 mg         29      5 mg         30      5 mg         31      5 mg             Date Details   10/01 This INR check               How to take your warfarin dose     To take:  5 mg Take 1 of the 5 mg tablets.           November 2018 Details    Sun Mon Tue Wed Thu Fri Sat         1      5 mg         2      5 mg         3      5 mg           4      5 mg         5      5 mg         6      5 mg         7      5 mg          8      5 mg         9            10                 11               12               13               14               15               16               17                 18               19               20               21               22               23               24                 25               26               27               28               29               30                 Date Details   No additional details    Date of next INR:  11/9/2018         How to take your warfarin dose     To take:  5 mg Take 1 of the 5 mg tablets.

## 2018-10-10 ENCOUNTER — TELEPHONE (OUTPATIENT)
Dept: FAMILY MEDICINE | Facility: CLINIC | Age: 53
End: 2018-10-10

## 2018-10-10 NOTE — TELEPHONE ENCOUNTER
Panel Management Review      Patient has the following on his problem list:     Diabetes    ASA: Passed    Last A1C  Lab Results   Component Value Date    A1C 8.9 05/24/2018    A1C 6.8 01/23/2017    A1C 6.3 11/03/2015    A1C 6.4 10/24/2014    A1C 6.5 04/10/2014     A1C tested: FAILED    Last LDL:    Lab Results   Component Value Date    CHOL 161 05/24/2018     Lab Results   Component Value Date    HDL 44 05/24/2018     Lab Results   Component Value Date    LDL 83 05/24/2018     Lab Results   Component Value Date    TRIG 172 05/24/2018     Lab Results   Component Value Date    CHOLHDLRATIO 3.7 11/03/2015     Lab Results   Component Value Date    NHDL 117 05/24/2018       Is the patient on a Statin? YES             Is the patient on Aspirin? YES    Medications     HMG CoA Reductase Inhibitors    simvastatin (ZOCOR) 20 MG tablet    Salicylates    aspirin 81 MG tablet          Last three blood pressure readings:  BP Readings from Last 3 Encounters:   05/24/18 128/76   01/23/17 136/80   11/03/15 124/72       Date of last diabetes office visit: 5/24/18     Tobacco History:     History   Smoking Status     Never Smoker   Smokeless Tobacco     Never Used           Composite cancer screening  Chart review shows that this patient is due/due soon for the following Colonoscopy  Summary:    Patient is due/failing the following:   A1C and COLONOSCOPY    Action needed:   Patient needs office visit for colonoscopy- 3month  diabetes.    Type of outreach:    Phone, left message for patient to call back.     Questions for provider review:    None                                                                                                                                    Neris Chavez [facility]       Chart routed to none .

## 2018-11-13 NOTE — TELEPHONE ENCOUNTER
LVM-please inform patient that he due for INR and DM with Dr. Moreno and Rn's. Elizabeth Guillen

## 2018-11-21 DIAGNOSIS — Z79.01 LONG TERM CURRENT USE OF ANTICOAGULANT THERAPY: ICD-10-CM

## 2018-11-21 RX ORDER — WARFARIN SODIUM 5 MG/1
TABLET ORAL
Qty: 90 TABLET | Refills: 0 | Status: SHIPPED | OUTPATIENT
Start: 2018-11-21 | End: 2019-04-23

## 2018-11-21 NOTE — TELEPHONE ENCOUNTER
Prescription approved per [facility] Refill Protocol.  Anahi Garcia RN, BSN

## 2018-11-21 NOTE — TELEPHONE ENCOUNTER
"Requested Prescriptions   Pending Prescriptions Disp Refills     warfarin (COUMADIN) 5 MG tablet [Pharmacy Med Name: WARFARIN SODIUM 5 MG TABLET] 90 tablet 0    Last Written Prescription Date:  08/27/2018  Last Fill Quantity: 90 tablet,  # refills: 0   Last office visit: 5/24/2018 with prescribing provider:  05/24/2018   Future Office Visit:   Next 5 appointments (look out 90 days)     Nov 28, 2018  7:40 AM CST   SHORT with Jaspal Moreno MD   [facility]    [address] [address]   [phone]            Nov 28, 2018  8:30 AM CST   Nurse Only with [place] RN   [facility]    [address] [address]   [phone]                  Sig: TAKE 1 TABLET BY MOUTH DAILY OR AS DIRECTED BY INR CLINIC    Vitamin K Antagonists Failed    11/21/2018  2:02 AM       Failed - INR is within goal in the past 6 weeks    Confirm INR is within goal in the past 6 weeks.     Recent Labs   Lab Test 10/01/18   INR  2.5*                      Passed - Recent (12 mo) or future (30 days) visit within the authorizing provider's specialty    Patient had office visit in the last 12 months or has a visit in the next 30 days with authorizing provider or within the authorizing provider's specialty.  See \"Patient Info\" tab in inbasket, or \"Choose Columns\" in Meds & Orders section of the refill encounter.             Passed - Patient is 18 years of age or older          Lázaro OSMANT  "

## 2018-12-01 DIAGNOSIS — E11.9 TYPE 2 DIABETES MELLITUS WITHOUT COMPLICATION, WITHOUT LONG-TERM CURRENT USE OF INSULIN (H): ICD-10-CM

## 2018-12-02 NOTE — TELEPHONE ENCOUNTER
"Requested Prescriptions   Pending Prescriptions Disp Refills     glipiZIDE (GLUCOTROL XL) 10 MG 24 hr tablet [Pharmacy Med Name: GLIPIZIDE ER 10 MG TABLET]  Last Written Prescription Date:  08/30/2018  Last Fill Quantity: 90,  # refills: 0   Last office visit: 5/24/2018 with prescribing provider:  05/24/2018   Future Office Visit:   Next 5 appointments (look out 90 days)     Jan 07, 2019  8:40 AM CST   SHORT with Jaspal [place]no MD   [facility]    [address] [address]   [phone]            Jan 07, 2019  9:00 AM CST   Nurse Only with [facility] RN   [facility]    [address] [address]   [phone]                  90 tablet 0     Sig: TAKE 1 TABLET BY MOUTH EVERY DAY    Sulfonylurea Agents Failed    12/1/2018 10:57 AM       Failed - Blood pressure less than 140/90 in past 6 months    BP Readings from Last 3 Encounters:   05/24/18 128/76   01/23/17 136/80   11/03/15 124/72                Failed - Patient has documented A1c within the specified period of time.    If HgbA1C is 8 or greater, it needs to be on file within the past 3 months.  If less than 8, must be on file within the past 6 months.     Recent Labs   Lab Test  05/24/18   0935   A1C  8.9*            Failed - Recent (6 mo) or future (30 days) visit within the authorizing provider's specialty    Patient had office visit in the last 6 months or has a visit in the next 30 days with authorizing provider or within the authorizing provider's specialty.  See \"Patient Info\" tab in inbasket, or \"Choose Columns\" in Meds & Orders section of the refill encounter.           Passed - Patient has documented LDL within the past 12 mos.    Recent Labs   Lab Test  05/24/18   0935   LDL  83            Passed - Patient has had a Microalbumin in the past 12 mos.    Recent Labs   Lab Test  05/24/18   0811   MICROL  22   UMALCR  7.42            " Passed - Patient is age 18 or older       Passed - Patient has a recent creatinine (normal) within the past 12 mos.    Recent Labs   Lab Test  05/24/18   0935   CR  1.07

## 2018-12-03 NOTE — TELEPHONE ENCOUNTER
Routing refill request to provider for review/approval because:  Patient needs to be seen because:  Past due for Dm check and dana refill was given  Anahi Garcia RN, BSN

## 2018-12-04 RX ORDER — GLIPIZIDE 10 MG/1
TABLET, FILM COATED, EXTENDED RELEASE ORAL
Qty: 30 TABLET | Refills: 0 | Status: SHIPPED | OUTPATIENT
Start: 2018-12-04 | End: 2018-12-31

## 2018-12-31 DIAGNOSIS — E11.9 TYPE 2 DIABETES MELLITUS WITHOUT COMPLICATION, WITHOUT LONG-TERM CURRENT USE OF INSULIN (H): ICD-10-CM

## 2018-12-31 RX ORDER — GLIPIZIDE 10 MG/1
TABLET, FILM COATED, EXTENDED RELEASE ORAL
Qty: 30 TABLET | Refills: 0 | Status: SHIPPED | OUTPATIENT
Start: 2018-12-31 | End: 2019-02-04

## 2018-12-31 NOTE — TELEPHONE ENCOUNTER
Prescription approved per [facility] Refill Protocol.  For one more 30 day fill    Anette Magana RN

## 2018-12-31 NOTE — TELEPHONE ENCOUNTER
Requested Prescriptions   Pending Prescriptions Disp Refills     glipiZIDE (GLUCOTROL XL) 10 MG 24 hr tablet [Pharmacy Med Name: GLIPIZIDE ER 10 MG TABLET] 30 tablet 0    Last Written Prescription Date:  12/04/2018  Last Fill Quantity: 30 tablet,  # refills: 0   Last office visit: 5/24/2018 with prescribing provider:  05/24/2018   Future Office Visit:   Next 5 appointments (look out 90 days)    Jan 14, 2019  9:00 AM CST  Nurse Only with LV RN  [facility]) [address] [facility] [address]  [phone]   Jan 30, 2019  8:40 AM CST  SHORT with Jaspal Moreno MD  [facility]) [address] [facility] [address]  [phone]          Sig: TAKE 1 TABLET BY MOUTH EVERY DAY    Sulfonylurea Agents Failed - 12/31/2018  1:24 AM       Failed - Blood pressure less than 140/90 in past 6 months    BP Readings from Last 3 Encounters:   05/24/18 128/76   01/23/17 136/80   11/03/15 124/72                Failed - Patient has documented A1c within the specified period of time.    If HgbA1C is 8 or greater, it needs to be on file within the past 3 months.  If less than 8, must be on file within the past 6 months.     Recent Labs   Lab Test 05/24/18  0935   A1C 8.9*            Passed - Patient has documented LDL within the past 12 mos.    Recent Labs   Lab Test 05/24/18  0935   LDL 83            Passed - Patient has had a Microalbumin in the past 12 mos.    Recent Labs   Lab Test 05/24/18  0811   MICROL 22   UMALCR 7.42            Passed - Patient is age 18 or older       Passed - Patient has a recent creatinine (normal) within the past 12 mos.    Recent Labs   Lab Test 05/24/18  0935   CR 1.07            Passed - Recent (6 mo) or future (30 days) visit within the authorizing provider's specialty    Patient had office visit in the last 6 months or has a visit in the next 30 days with authorizing provider or within the  "authorizing provider's specialty.  See \"Patient Info\" tab in inbasket, or \"Choose Columns\" in Meds & Orders section of the refill encounter.            Lázaro OSMANT  "

## 2019-01-14 ENCOUNTER — ANTICOAGULATION THERAPY VISIT (OUTPATIENT)
Dept: NURSING | Facility: CLINIC | Age: 54
End: 2019-01-14
Payer: COMMERCIAL

## 2019-01-14 LAB — INR POINT OF CARE: 2 (ref 0.86–1.14)

## 2019-01-14 PROCEDURE — 36416 COLLJ CAPILLARY BLOOD SPEC: CPT | Performed by: FAMILY MEDICINE

## 2019-01-14 PROCEDURE — 85610 PROTHROMBIN TIME: CPT | Mod: QW | Performed by: FAMILY MEDICINE

## 2019-01-14 NOTE — PROGRESS NOTES
ANTICOAGULATION FOLLOW-UP CLINIC VISIT    Patient Name:  Schuyler Castro  Date:  2019  Contact Type:  Face to Face    SUBJECTIVE:     Patient Findings     Positives:   No Problem Findings           OBJECTIVE    INR Protime   Date Value Ref Range Status   2019 2.0 (A) 0.86 - 1.14 Final       ASSESSMENT / PLAN  No question data found.  Anticoagulation Summary  As of 2019    INR goal:   2.0-3.0   TTR:   95.2 % (3 y)   INR used for dosin.0 (2019)   Warfarin maintenance plan:   5 mg (5 mg x 1) every day   Full warfarin instructions:   5 mg every day   Weekly warfarin total:   35 mg   No change documented:   Delia Garcia RN   Plan last modified:   Delia Garcia RN (2016)   Next INR check:   2019   Target end date:       Indications    Long-term (current) use of anticoagulants [Z79.01] [Z79.01]             Anticoagulation Episode Summary     INR check location:       Preferred lab:       Send INR reminders to:   RN POOL - LK    Comments:               See the Encounter Report to view Anticoagulation Flowsheet and Dosing Calendar (Go to Encounters tab in chart review, and find the Anticoagulation Therapy Visit)        Delia Garcia RN

## 2019-02-04 DIAGNOSIS — E11.9 TYPE 2 DIABETES MELLITUS WITHOUT COMPLICATION, WITHOUT LONG-TERM CURRENT USE OF INSULIN (H): ICD-10-CM

## 2019-02-04 NOTE — TELEPHONE ENCOUNTER
Routing refill request to provider for review/approval because:  La given x1 and patient did not follow up, please advise  Anahi Garcia RN, BSN

## 2019-02-04 NOTE — TELEPHONE ENCOUNTER
Requested Prescriptions   Pending Prescriptions Disp Refills     glipiZIDE (GLUCOTROL XL) 10 MG 24 hr tablet [Pharmacy Med Name: GLIPIZIDE ER 10 MG TABLET] 30 tablet 0    Last Written Prescription Date:  12/03/2018  Last Fill Quantity: 30 tablet,  # refills: 0   Last office visit: 5/24/2018 with prescribing provider:  05/24/2018   Future Office Visit:   Next 5 appointments (look out 90 days)    Feb 25, 2019  8:30 AM CST  Nurse Only with LV RN  [facility] [place]) [address] [facility] [address]  [phone]   Feb 25, 2019  8:40 AM CST  Office Visit with Jaspal Moreno MD  [facility] [address] [address] [facility] [address]  [phone]          Sig: TAKE 1 TABLET BY MOUTH EVERY DAY    Sulfonylurea Agents Failed - 2/4/2019  1:26 AM       Failed - Blood pressure less than 140/90 in past 6 months    BP Readings from Last 3 Encounters:   05/24/18 128/76   01/23/17 136/80   11/03/15 124/72                Failed - Patient has documented A1c within the specified period of time.    If HgbA1C is 8 or greater, it needs to be on file within the past 3 months.  If less than 8, must be on file within the past 6 months.     Recent Labs   Lab Test 05/24/18  0935   A1C 8.9*            Passed - Patient has documented LDL within the past 12 mos.    Recent Labs   Lab Test 05/24/18  0935   LDL 83            Passed - Patient has had a Microalbumin in the past 12 mos.    Recent Labs   Lab Test 05/24/18  0811   MICROL 22   UMALCR 7.42            Passed - Medication is active on med list       Passed - Patient is age 18 or older       Passed - Patient has a recent creatinine (normal) within the past 12 mos.    Recent Labs   Lab Test 05/24/18  0935   CR 1.07            Passed - Recent (6 mo) or future (30 days) visit within the authorizing provider's specialty    Patient had office visit in the last 6 months or has a visit in the next 30  "days with authorizing provider or within the authorizing provider's specialty.  See \"Patient Info\" tab in inbasket, or \"Choose Columns\" in Meds & Orders section of the refill encounter.            Lázaro Doty XRT  "

## 2019-02-06 RX ORDER — GLIPIZIDE 10 MG/1
TABLET, FILM COATED, EXTENDED RELEASE ORAL
Qty: 30 TABLET | Refills: 0 | Status: SHIPPED | OUTPATIENT
Start: 2019-02-06 | End: 2019-03-28

## 2019-03-11 ENCOUNTER — TELEPHONE (OUTPATIENT)
Dept: FAMILY MEDICINE | Facility: CLINIC | Age: 54
End: 2019-03-11

## 2019-03-11 NOTE — TELEPHONE ENCOUNTER
"Spoke with pt he is over due for INR and appt with PCP for DM check     He states he has \"plenty of warfarin\" even though this was last filled 11/21/18 for #90 so he should be out by now if taking correctly.       Pt advised he has cancelled several appointments with PCP and [facility] and he needs to be seen.  Suggested other clinic's if that may be more convenient for him.      He states he will set appointment for INR soon as he does have appt with PCP 4/5/19 and states he does have medication to last until them    PCP please note pt is non compliant with INR checks.      Anette Magana, RN    "

## 2019-03-28 DIAGNOSIS — E11.9 TYPE 2 DIABETES MELLITUS WITHOUT COMPLICATION, WITHOUT LONG-TERM CURRENT USE OF INSULIN (H): ICD-10-CM

## 2019-03-28 NOTE — TELEPHONE ENCOUNTER
Routing refill request to provider for review/approval because:  PT has not been seen since 5/24/18 and has cancelled on no showed several appointments    Do you want to continue to give limited RF    Anette Magana, RN

## 2019-03-28 NOTE — TELEPHONE ENCOUNTER
"Requested Prescriptions   Pending Prescriptions Disp Refills     glipiZIDE (GLUCOTROL XL) 10 MG 24 hr tablet [Pharmacy Med Name: GLIPIZIDE ER 10 MG TABLET] 30 tablet 0    Last Written Prescription Date:  02/06/2019  Last Fill Quantity: 30 tablet,  # refills: 0   Last office visit: 5/24/2018 with prescribing provider:  05/24/2018   Future Office Visit:   Next 5 appointments (look out 90 days)    Apr 08, 2019  8:40 AM CDT  Office Visit [facility]asp[facility]  [facility] [address] [facility] [address] [phone]          Sig: TAKE 1 TABLET BY MOUTH EVERY DAY    Sulfonylurea Agents Failed - 3/28/2019 11:55 AM       Failed - Blood pressure less than 140/90 in past 6 months    BP Readings from Last 3 Encounters:   05/24/18 128/76   01/23/17 136/80   11/03/15 124/72                Failed - Patient has documented A1c within the specified period of time.    If HgbA1C is 8 or greater, it needs to be on file within the past 3 months.  If less than 8, must be on file within the past 6 months.     Recent Labs   Lab Test 05/24/18  0935   A1C 8.9*            Passed - Patient has documented LDL within the past 12 mos.    Recent Labs   Lab Test 05/24/18  0935   LDL 83            Passed - Patient has had a Microalbumin in the past 12 mos.    Recent Labs   Lab Test 05/24/18  0811   MICROL 22   UMALCR 7.42            Passed - Medication is active on med list       Passed - Patient is age 18 or older       Passed - Patient has a recent creatinine (normal) within the past 12 mos.    Recent Labs   Lab Test 05/24/18  0935   CR 1.07            Passed - Recent (6 mo) or future (30 days) visit within the authorizing provider's specialty    Patient had office visit in the last 6 months or has a visit in the next 30 days with authorizing provider or within the authorizing provider's specialty.  See \"Patient Info\" tab in inbasket, or \"Choose Columns\" in Meds & Orders section of the " refill encounter.            Lázaro Doty XRT

## 2019-03-29 ENCOUNTER — ANTICOAGULATION THERAPY VISIT (OUTPATIENT)
Dept: NURSING | Facility: CLINIC | Age: 54
End: 2019-03-29
Payer: COMMERCIAL

## 2019-03-29 DIAGNOSIS — Z86.718 PERSONAL HISTORY OF DVT (DEEP VEIN THROMBOSIS): ICD-10-CM

## 2019-03-29 DIAGNOSIS — Z79.01 ANTICOAGULATION MONITORING, INR RANGE 2-3: ICD-10-CM

## 2019-03-29 DIAGNOSIS — Z86.711 HISTORY OF PULMONARY EMBOLISM: ICD-10-CM

## 2019-03-29 LAB — INR POINT OF CARE: 1.9 (ref 0.86–1.14)

## 2019-03-29 PROCEDURE — 36416 COLLJ CAPILLARY BLOOD SPEC: CPT

## 2019-03-29 PROCEDURE — 85610 PROTHROMBIN TIME: CPT | Mod: QW

## 2019-03-29 RX ORDER — GLIPIZIDE 10 MG/1
TABLET, FILM COATED, EXTENDED RELEASE ORAL
Qty: 15 TABLET | Refills: 0 | Status: SHIPPED | OUTPATIENT
Start: 2019-03-29 | End: 2019-04-24

## 2019-03-29 RX ORDER — WARFARIN SODIUM 5 MG/1
TABLET ORAL
Qty: 30 TABLET | Refills: 0 | Status: SHIPPED | OUTPATIENT
Start: 2019-03-29 | End: 2019-04-23

## 2019-03-29 NOTE — PROGRESS NOTES
ANTICOAGULATION FOLLOW-UP CLINIC VISIT    Patient Name:  Schuyler Castro  Date:  3/29/2019  Contact Type:  Face to Face    SUBJECTIVE:     Patient Findings     Positives:   Change in diet/appetite    Comments:   Has had greens including asparagus and salads           OBJECTIVE    INR Protime   Date Value Ref Range Status   2019 1.9 (A) 0.86 - 1.14 Final       ASSESSMENT / PLAN  No question data found.  Anticoagulation Summary  As of 3/29/2019    INR goal:   2.0-3.0   TTR:   89.1 % (3.2 y)   INR used for dosin.9! (3/29/2019)   Warfarin maintenance plan:   5 mg (5 mg x 1) every day   Full warfarin instructions:   3/29: 7.5 mg; Otherwise 5 mg every day   Weekly warfarin total:   35 mg   Plan last modified:   Delia Garcia RN (2016)   Next INR check:   2019   Target end date:       Indications    Long-term (current) use of anticoagulants [Z79.01] [Z79.01]             Anticoagulation Episode Summary     INR check location:       Preferred lab:       Send INR reminders to:   RN POOL - LK    Comments:               See the Encounter Report to view Anticoagulation Flowsheet and Dosing Calendar (Go to Encounters tab in chart review, and find the Anticoagulation Therapy Visit)    Dosage adjustment made based on physician directed care plan.    Delia Garcia RN

## 2019-04-08 ENCOUNTER — ANTICOAGULATION THERAPY VISIT (OUTPATIENT)
Dept: NURSING | Facility: CLINIC | Age: 54
End: 2019-04-08
Payer: COMMERCIAL

## 2019-04-08 LAB — INR POINT OF CARE: 2.1 (ref 0.86–1.14)

## 2019-04-08 PROCEDURE — 85610 PROTHROMBIN TIME: CPT | Mod: QW

## 2019-04-08 PROCEDURE — 36416 COLLJ CAPILLARY BLOOD SPEC: CPT

## 2019-04-08 NOTE — PROGRESS NOTES
ANTICOAGULATION FOLLOW-UP CLINIC VISIT    Patient Name:  Schuyler Castro  Date:  2019  Contact Type:  Face to Face    SUBJECTIVE:     Patient Findings     Comments:   No other significant past medical history or family history.           OBJECTIVE    INR Protime   Date Value Ref Range Status   2019 2.1 (A) 0.86 - 1.14 Final       ASSESSMENT / PLAN  No question data found.  Anticoagulation Summary  As of 2019    INR goal:   2.0-3.0   TTR:   88.8 % (3.2 y)   INR used for dosin.1 (2019)   Warfarin maintenance plan:   5 mg (5 mg x 1) every day   Full warfarin instructions:   5 mg every day   Weekly warfarin total:   35 mg   Plan last modified:   Delia Garcia RN (2016)   Next INR check:   2019   Target end date:       Indications    Long-term (current) use of anticoagulants [Z79.01] [Z79.01]             Anticoagulation Episode Summary     INR check location:       Preferred lab:       Send INR reminders to:   RN POOL - LK    Comments:               See the Encounter Report to view Anticoagulation Flowsheet and Dosing Calendar (Go to Encounters tab in chart review, and find the Anticoagulation Therapy Visit)        Anette Magana RN

## 2019-04-23 ENCOUNTER — ANTICOAGULATION THERAPY VISIT (OUTPATIENT)
Dept: NURSING | Facility: CLINIC | Age: 54
End: 2019-04-23
Payer: COMMERCIAL

## 2019-04-23 ENCOUNTER — TELEPHONE (OUTPATIENT)
Dept: FAMILY MEDICINE | Facility: CLINIC | Age: 54
End: 2019-04-23

## 2019-04-23 DIAGNOSIS — Z86.718 PERSONAL HISTORY OF DVT (DEEP VEIN THROMBOSIS): Primary | ICD-10-CM

## 2019-04-23 DIAGNOSIS — Z79.01 ANTICOAGULATION MONITORING, INR RANGE 2-3: ICD-10-CM

## 2019-04-23 DIAGNOSIS — Z86.718 PERSONAL HISTORY OF DVT (DEEP VEIN THROMBOSIS): ICD-10-CM

## 2019-04-23 DIAGNOSIS — Z86.711 HISTORY OF PULMONARY EMBOLISM: ICD-10-CM

## 2019-04-23 DIAGNOSIS — I26.99 OTHER ACUTE PULMONARY EMBOLISM WITHOUT ACUTE COR PULMONALE (H): ICD-10-CM

## 2019-04-23 LAB — INR POINT OF CARE: 1.9 (ref 0.86–1.14)

## 2019-04-23 PROCEDURE — 99207 ZZC NO CHARGE NURSE ONLY: CPT

## 2019-04-23 PROCEDURE — 85610 PROTHROMBIN TIME: CPT | Mod: QW

## 2019-04-23 PROCEDURE — 36416 COLLJ CAPILLARY BLOOD SPEC: CPT

## 2019-04-23 RX ORDER — WARFARIN SODIUM 5 MG/1
TABLET ORAL
Qty: 32 TABLET | Refills: 1 | Status: SHIPPED | OUTPATIENT
Start: 2019-04-23 | End: 2019-06-25

## 2019-04-23 NOTE — PROGRESS NOTES
ANTICOAGULATION FOLLOW-UP CLINIC VISIT    Patient Name:  Schuyler Castro  Date:  2019  Contact Type:  Face to Face    SUBJECTIVE:     Patient Findings     Positives:   Change in activity (Increased activity and he hopes to remain active.), Change in diet/appetite (Increased greens recently.  Pt plans to continue eating greens so I encouraged consistency and small portions.)    Comments:   Patient transferring from [facility], he will establish with Dr. Juan on 19.  Patient's INR has been trending low and with increase in greens and activity, I increased his weekly Warfarin dose.           OBJECTIVE    INR Protime   Date Value Ref Range Status   2019 1.9 (A) 0.86 - 1.14 Final       ASSESSMENT / PLAN  INR assessment THER    Recheck INR In: 2 WEEKS    INR Location Clinic      Anticoagulation Summary  As of 2019    INR goal:   2.0-3.0   TTR:   88.3 % (3.3 y)   INR used for dosin.9! (2019)   Warfarin maintenance plan:   7.5 mg (5 mg x 1.5) every Tue; 5 mg (5 mg x 1) all other days   Full warfarin instructions:   7.5 mg every Tue; 5 mg all other days   Weekly warfarin total:   37.5 mg   Plan last modified:   Lyly Florian RN (2019)   Next INR check:   2019   Target end date:       Indications    Long-term (current) use of anticoagulants [Z79.01] [Z79.01]             Anticoagulation Episode Summary     INR check location:       Preferred lab:       Send INR reminders to:   [facility]    Comments:         Anticoagulation Care Providers     Provider Role Specialty Phone number    Rasheeda Juan MD [facility] [phone]            See the Encounter Report to view Anticoagulation Flowsheet and Dosing Calendar (Go to Encounters tab in chart review, and find the Anticoagulation Therapy Visit)        Lyly Florian RN

## 2019-04-23 NOTE — TELEPHONE ENCOUNTER
Patient is hoping to get in before 1. Your schedule is booked for the day. Would you be able to work him in earlier?    Patrica SEE - JIMMIE/JAYLENE  4/23/2019 7:14 AM

## 2019-04-24 ENCOUNTER — TELEPHONE (OUTPATIENT)
Dept: FAMILY MEDICINE | Facility: CLINIC | Age: 54
End: 2019-04-24

## 2019-04-24 ENCOUNTER — OFFICE VISIT (OUTPATIENT)
Dept: FAMILY MEDICINE | Facility: CLINIC | Age: 54
End: 2019-04-24
Payer: COMMERCIAL

## 2019-04-24 VITALS
BODY MASS INDEX: 38.47 KG/M2 | HEIGHT: 72 IN | WEIGHT: 284 LBS | SYSTOLIC BLOOD PRESSURE: 132 MMHG | OXYGEN SATURATION: 96 % | DIASTOLIC BLOOD PRESSURE: 82 MMHG | TEMPERATURE: 98.3 F | HEART RATE: 81 BPM

## 2019-04-24 DIAGNOSIS — E66.01 MORBID OBESITY (H): ICD-10-CM

## 2019-04-24 DIAGNOSIS — L57.0 AK (ACTINIC KERATOSIS): ICD-10-CM

## 2019-04-24 DIAGNOSIS — I10 HYPERTENSION GOAL BP (BLOOD PRESSURE) < 140/90: ICD-10-CM

## 2019-04-24 DIAGNOSIS — L82.1 SEBORRHEIC KERATOSIS: ICD-10-CM

## 2019-04-24 DIAGNOSIS — B35.3 TINEA PEDIS OF BOTH FEET: ICD-10-CM

## 2019-04-24 DIAGNOSIS — E78.5 HYPERLIPIDEMIA LDL GOAL <100: ICD-10-CM

## 2019-04-24 DIAGNOSIS — I26.99 OTHER ACUTE PULMONARY EMBOLISM WITHOUT ACUTE COR PULMONALE (H): ICD-10-CM

## 2019-04-24 DIAGNOSIS — E11.9 TYPE 2 DIABETES MELLITUS WITHOUT COMPLICATION, WITHOUT LONG-TERM CURRENT USE OF INSULIN (H): ICD-10-CM

## 2019-04-24 DIAGNOSIS — Z00.00 ROUTINE HISTORY AND PHYSICAL EXAMINATION OF ADULT: Primary | ICD-10-CM

## 2019-04-24 LAB
ANION GAP SERPL CALCULATED.3IONS-SCNC: 11 MMOL/L (ref 3–14)
BUN SERPL-MCNC: 21 MG/DL (ref 7–30)
CALCIUM SERPL-MCNC: 9 MG/DL (ref 8.5–10.1)
CHLORIDE SERPL-SCNC: 103 MMOL/L (ref 94–109)
CHOLEST SERPL-MCNC: 150 MG/DL
CO2 SERPL-SCNC: 20 MMOL/L (ref 20–32)
CREAT SERPL-MCNC: 1.12 MG/DL (ref 0.66–1.25)
CREAT UR-MCNC: 313 MG/DL
GFR SERPL CREATININE-BSD FRML MDRD: 75 ML/MIN/{1.73_M2}
GLUCOSE SERPL-MCNC: 263 MG/DL (ref 70–99)
HBA1C MFR BLD: 9.5 % (ref 0–5.6)
HDLC SERPL-MCNC: 40 MG/DL
LDLC SERPL CALC-MCNC: 80 MG/DL
MICROALBUMIN UR-MCNC: 26 MG/L
MICROALBUMIN/CREAT UR: 8.4 MG/G CR (ref 0–17)
NONHDLC SERPL-MCNC: 110 MG/DL
POTASSIUM SERPL-SCNC: 4.1 MMOL/L (ref 3.4–5.3)
SODIUM SERPL-SCNC: 134 MMOL/L (ref 133–144)
TRIGL SERPL-MCNC: 148 MG/DL
TSH SERPL DL<=0.005 MIU/L-ACNC: 2.6 MU/L (ref 0.4–4)

## 2019-04-24 PROCEDURE — 99214 OFFICE O/P EST MOD 30 MIN: CPT | Mod: 25 | Performed by: FAMILY MEDICINE

## 2019-04-24 PROCEDURE — 82043 UR ALBUMIN QUANTITATIVE: CPT | Performed by: FAMILY MEDICINE

## 2019-04-24 PROCEDURE — 99207 C FOOT EXAM  NO CHARGE: CPT | Mod: 25 | Performed by: FAMILY MEDICINE

## 2019-04-24 PROCEDURE — 36415 COLL VENOUS BLD VENIPUNCTURE: CPT | Performed by: FAMILY MEDICINE

## 2019-04-24 PROCEDURE — 84443 ASSAY THYROID STIM HORMONE: CPT | Performed by: FAMILY MEDICINE

## 2019-04-24 PROCEDURE — 99396 PREV VISIT EST AGE 40-64: CPT | Performed by: FAMILY MEDICINE

## 2019-04-24 PROCEDURE — 80061 LIPID PANEL: CPT | Performed by: FAMILY MEDICINE

## 2019-04-24 PROCEDURE — 80048 BASIC METABOLIC PNL TOTAL CA: CPT | Performed by: FAMILY MEDICINE

## 2019-04-24 PROCEDURE — 83036 HEMOGLOBIN GLYCOSYLATED A1C: CPT | Performed by: FAMILY MEDICINE

## 2019-04-24 RX ORDER — SIMVASTATIN 20 MG
20 TABLET ORAL AT BEDTIME
Qty: 90 TABLET | Refills: 3 | Status: SHIPPED | OUTPATIENT
Start: 2019-04-24 | End: 2020-06-17

## 2019-04-24 RX ORDER — ATENOLOL 50 MG/1
50 TABLET ORAL DAILY
Qty: 90 TABLET | Refills: 3 | Status: SHIPPED | OUTPATIENT
Start: 2019-04-24 | End: 2020-06-17

## 2019-04-24 RX ORDER — LISINOPRIL AND HYDROCHLOROTHIAZIDE 12.5; 2 MG/1; MG/1
TABLET ORAL
Qty: 180 TABLET | Refills: 3 | Status: SHIPPED | OUTPATIENT
Start: 2019-04-24 | End: 2020-06-17

## 2019-04-24 RX ORDER — GLIPIZIDE 10 MG/1
10 TABLET, FILM COATED, EXTENDED RELEASE ORAL DAILY
Qty: 90 TABLET | Refills: 3 | Status: SHIPPED | OUTPATIENT
Start: 2019-04-24 | End: 2019-04-24

## 2019-04-24 RX ORDER — CLOTRIMAZOLE 1 %
CREAM (GRAM) TOPICAL 2 TIMES DAILY
Qty: 60 G | Refills: 1 | Status: SHIPPED | OUTPATIENT
Start: 2019-04-24 | End: 2019-07-02

## 2019-04-24 RX ORDER — GLIPIZIDE 10 MG/1
20 TABLET, FILM COATED, EXTENDED RELEASE ORAL DAILY
Qty: 180 TABLET | Refills: 3 | Status: SHIPPED | OUTPATIENT
Start: 2019-04-24 | End: 2020-06-17

## 2019-04-24 ASSESSMENT — ENCOUNTER SYMPTOMS
HEMATURIA: 0
CHILLS: 0
CONSTIPATION: 0
HEMATOCHEZIA: 0
ABDOMINAL PAIN: 0
COUGH: 0

## 2019-04-24 ASSESSMENT — MIFFLIN-ST. JEOR: SCORE: 2173.28

## 2019-04-24 NOTE — TELEPHONE ENCOUNTER
Patient returned call, advised of Dr. Juan's direction, verbalized understanding  Jamir Mayen MA

## 2019-04-24 NOTE — TELEPHONE ENCOUNTER
Panel Management Review      Patient has the following on his problem list: None      Composite cancer screening  Chart review shows that this patient is due/due soon for the following Colonoscopy  Summary:    Patient is due/failing the following:   COLONOSCOPY    Action needed:   Patient needs office visit for colon.    Type of outreach:    was here at [facility] and was given a referral for colonoscopy    Questions for provider review:    None                                                                                                                                    Miguel Rocha [facility]       Chart routed to none .

## 2019-04-24 NOTE — PROGRESS NOTES
SUBJECTIVE:   CC: Schuyler Castro is an 53 year old male who presents for preventative health visit.     Healthy Habits:     Getting at least 3 servings of Calcium per day:  Yes    Bi-annual eye exam:  Yes    Dental care twice a year:  Yes    Sleep apnea or symptoms of sleep apnea:  None    Diet:  Diabetic and Carbohydrate counting    Frequency of exercise:  1 day/week    Duration of exercise:  15-30 minutes    Taking medications regularly:  Yes    Medication side effects:  None    PHQ-2 Total Score: 0    Additional concerns today:  No              Today's PHQ-2 Score:   PHQ-2 ( 1999 [employer]) 4/24/2019   Q1: Little interest or pleasure in doing things 0   Q2: Feeling down, depressed or hopeless 0   PHQ-2 Score 0   Q1: Little interest or pleasure in doing things Not at all   Q2: Feeling down, depressed or hopeless Not at all   PHQ-2 Score 0       Abuse: Current or Past(Physical, Sexual or Emotional)- No  Do you feel safe in your environment? Yes    Social History     Tobacco Use     Smoking status: Never Smoker     Smokeless tobacco: Never Used   Substance Use Topics     Alcohol use: Yes     Alcohol/week: 0.0 - 0.6 oz     If you drink alcohol do you typically have >3 drinks per day or >7 drinks per week? No    Alcohol Use 4/24/2019   Prescreen: >3 drinks/day or >7 drinks/week? No   Prescreen: >3 drinks/day or >7 drinks/week? -   No flowsheet data found.    Last PSA:   PSA   Date Value Ref Range Status   08/24/2010 0.92 0 - 4 ug/L Final       Reviewed orders with patient. Reviewed health maintenance and updated orders accordingly - Yes  Labs reviewed in EPIC  BP Readings from Last 3 Encounters:   04/24/19 132/82   05/24/18 128/76   01/23/17 136/80    Wt Readings from Last 3 Encounters:   04/24/19 128.8 kg (284 lb)   05/24/18 128.9 kg (284 lb 3.2 oz)   01/23/17 128.8 kg (284 lb)                  Patient Active Problem List   Diagnosis     Personal history of DVT (deep vein thrombosis)     Hyperlipidemia LDL goal <100      Hypertension goal BP (blood pressure) < 140/90     Type 2 diabetes mellitus without complication (H)     Long-term (current) use of anticoagulants [Z79.01]     Other acute pulmonary embolism without acute cor pulmonale (H)     Morbid obesity (H)     History reviewed. No pertinent surgical history.    Social History     Tobacco Use     Smoking status: Never Smoker     Smokeless tobacco: Never Used   Substance Use Topics     Alcohol use: Not Currently     Alcohol/week: 0.0 - 0.6 oz     Family History   Problem Relation Age of Onset     Neurologic Disorder Mother         sage duran     Dementia Father          Current Outpatient Medications   Medication Sig Dispense Refill     Acetaminophen (TYLENOL PO) Take 650 mg by mouth       aspirin 81 MG tablet Take 1 tablet (81 mg) by mouth daily 30 tablet 12     atenolol (TENORMIN) 50 MG tablet Take 1 tablet (50 mg) by mouth daily 90 tablet 3     blood glucose monitoring (NO BRAND SPECIFIED) meter device kit Use to test blood sugars 1-2 times daily or as directed. 1 kit 0     blood glucose monitoring (NO BRAND SPECIFIED) test strip Use to test blood sugars 1-2 times daily or as directed 100 strip 3     glipiZIDE (GLUCOTROL XL) 10 MG 24 hr tablet TAKE 1 TABLET BY MOUTH EVERY DAY 15 tablet 0     lisinopril-hydrochlorothiazide (PRINZIDE/ZESTORETIC) 20-12.5 MG per tablet TAKE 2 TABLETS BY MOUTH DAILY 180 tablet 3     metFORMIN (GLUCOPHAGE) 500 MG tablet TAKE 2 TABLETS BY MOUTH TWICE A DAY WITH MEALS 360 tablet 3     simvastatin (ZOCOR) 20 MG tablet Take 1 tablet (20 mg) by mouth At Bedtime 90 tablet 3     warfarin (COUMADIN) 5 MG tablet Take 7.5mg every Tu / Take 5mg all other days OR as instructed by INR clinic 32 tablet 1     No Known Allergies    Reviewed and updated as needed this visit by clinical staff  Tobacco  Allergies  Meds  Med Hx  Surg Hx  Fam Hx  Soc Hx        Reviewed and updated as needed this visit by Provider        Past Medical History:   Diagnosis  "Date     Diabetes mellitus type 2, noninsulin dependent (H) 2002     DVT of deep femoral vein (H) 8/2010     Hypertension 1996     Pulmonary embolus (H) 8/2010      History reviewed. No pertinent surgical history.   .super  Diabetes Follow-up      Patient is checking blood sugars: not at all    Diabetic concerns: possible high blood sugar.     Symptoms of hypoglycemia (low blood sugar): none     Paresthesias (numbness or burning in feet) or sores: No     Date of last diabetic eye exam: not done.    Diabetes Management Resources    Hyperlipidemia Follow-Up      Rate your low fat/cholesterol diet?: good    Taking statin?  Yes, no muscle aches from statin    Other lipid medications/supplements?:  none    Hypertension Follow-up      Outpatient blood pressures are not being checked.    Low Salt Diet: not monitoring salt    BP Readings from Last 2 Encounters:   04/24/19 132/82   05/24/18 128/76     Hemoglobin A1C (%)   Date Value   05/24/2018 8.9 (H)   01/23/2017 6.8 (H)     LDL Cholesterol Calculated (mg/dL)   Date Value   05/24/2018 83   01/23/2017 91       Amount of exercise or physical activity: None    Problems taking medications regularly: No    Medication side effects: none    Diet: regular (no restrictions)         Review of Systems   Constitutional: Negative for chills.   HENT: Negative for congestion.    Respiratory: Negative for cough.    Cardiovascular: Negative for chest pain.   Gastrointestinal: Negative for abdominal pain, constipation and hematochezia.   Genitourinary: Negative for hematuria.         OBJECTIVE:   /82 (BP Location: Right arm, Patient Position: Sitting, Cuff Size: Adult Regular)   Pulse 81   Temp 98.3  F (36.8  C) (Oral)   Ht 1.816 m (5' 11.5\")   Wt 128.8 kg (284 lb)   SpO2 96%   BMI 39.06 kg/m      Physical Exam  GENERAL: healthy, alert and no distress  EYES: Eyes grossly normal to inspection, PERRL and conjunctivae and sclerae normal  HENT: ear canals and TM's normal, nose and " mouth without ulcers or lesions  NECK: no adenopathy, no asymmetry, masses, or scars and thyroid normal to palpation  RESP: lungs clear to auscultation - no rales, rhonchi or wheezes  CV: regular rate and rhythm, normal S1 S2, no S3 or S4, no murmur, click or rub, no peripheral edema and peripheral pulses strong  ABDOMEN: soft, nontender, no hepatosplenomegaly, no masses and bowel sounds normal  MS: no gross musculoskeletal defects noted, no edema  SKIN: multiple seborrhoic keratosis lesions, (large) on the back, along with some white papule on the face, multiple, range between 1 to 7 mm in size.  NEURO: Normal strength and tone, mentation intact and speech normal  PSYCH: mentation appears normal, affect normal/bright  Diabetic foot exam: normal DP and PT pulses, no trophic changes or ulcerative lesions, normal sensory exam, tinea pedis and onychomycosis    Diagnostic Test Results:  No results found for this or any previous visit (from the past 24 hour(s)).    ASSESSMENT/PLAN:   1. Other acute pulmonary embolism without acute cor pulmonale (H)  Continue on coumadin, pt to follow up with INR clinic.    2. Morbid obesity (H)  Pt is very motivated to loose weight and exercise, will recheck with him in 3 months.     3. Type 2 diabetes mellitus without complication, without long-term current use of insulin (H)  awaitng for A1c results, will adjust his meds accordingly.  - Lipid panel reflex to direct LDL Fasting  - Albumin Random Urine Quantitative with Creat Ratio  - Basic metabolic panel  - TSH with free T4 reflex  - Hemoglobin A1c  - metFORMIN (GLUCOPHAGE) 500 MG tablet; TAKE 2 TABLETS BY MOUTH TWICE A DAY WITH MEALS  Dispense: 360 tablet; Refill: 3  - glipiZIDE (GLUCOTROL XL) 10 MG 24 hr tablet; Take 1 tablet (10 mg) by mouth daily  Dispense: 90 tablet; Refill: 3  - FOOT EXAM  - OFFICE/OUTPT VISIT,ESTLEVL III    4. Hyperlipidemia LDL goal <100  Controlled.  - simvastatin (ZOCOR) 20 MG tablet; Take 1 tablet (20 mg)  "by mouth At Bedtime  Dispense: 90 tablet; Refill: 3  - OFFICE/OUTPT VISIT,EST,LEVL III    5. Hypertension goal BP (blood pressure) < 140/90  Controlled.  - lisinopril-hydrochlorothiazide (PRINZIDE/ZESTORETIC) 20-12.5 MG tablet; TAKE 2 TABLETS BY MOUTH DAILY  Dispense: 180 tablet; Refill: 3  - atenolol (TENORMIN) 50 MG tablet; Take 1 tablet (50 mg) by mouth daily  Dispense: 90 tablet; Refill: 3  - OFFICE/OUTPT VISIT,EST,LEVL III    6. Routine history and physical examination of adult  Today I counseled the patient about diet, regular exercise and weight loss planning.      7. AK (actinic keratosis)  On the face, refer to Derm, given the phone no to call  - DERMATOLOGY REFERRAL  - OFFICE/OUTPT VISIT,EST,LEVL III    8. Tinea pedis of both feet  Start on   - clotrimazole (LOTRIMIN) 1 % external cream; Apply topically 2 times daily for 21 days  Dispense: 60 g; Refill: 1  - OFFICE/OUTPT VISIT,EST,LEVL III    9. Seborrheic keratosis  Monitor if any changes.      COUNSELING:   Reviewed preventive health counseling, as reflected in patient instructions       Regular exercise       Healthy diet/nutrition    BP Readings from Last 1 Encounters:   04/24/19 132/82     Estimated body mass index is 39.06 kg/m  as calculated from the following:    Height as of this encounter: 1.816 m (5' 11.5\").    Weight as of this encounter: 128.8 kg (284 lb).      Weight management plan: Discussed healthy diet and exercise guidelines     reports that he has never smoked. He has never used smokeless tobacco.      Counseling Resources:  ATP IV Guidelines  Pooled Cohorts Equation Calculator  FRAX Risk Assessment  ICSI Preventive Guidelines  Dietary Guidelines for Americans, 2010  USDA's MyPlate  ASA Prophylaxis  Lung CA Screening    Rasheeda Juan MD  [facility]  Answers for HPI/ROS submitted by the patient on 4/24/2019   Annual Exam:  Would you say that in general your health is: : Good  Now thinking about your physical health, which " includes physical illness and injury; for how many days during the past 30 days was your physical health not good?: None  Now thinking about your mental health, which includes stress, depression, and problems with emotions; for how many days during the past 30 days was your mental health not good?: None  During the past 30 days, for about how many days did poor physical or mental health keep you from doing your usual activities, such as self-care, work, or recreation?: None

## 2019-04-24 NOTE — TELEPHONE ENCOUNTER
Rasheeda Juan MD [facility]             Please call patient :   His blood sugar is still very high.   I recommend to go up on glipizide to 10 mg twice daily, follow up in 3 months as we discussed.   Rasheeda Juan MD   [facility] [address]   [phone]

## 2019-05-02 DIAGNOSIS — Z86.718 PERSONAL HISTORY OF DVT (DEEP VEIN THROMBOSIS): ICD-10-CM

## 2019-05-02 DIAGNOSIS — Z79.01 ANTICOAGULATION MONITORING, INR RANGE 2-3: ICD-10-CM

## 2019-05-02 DIAGNOSIS — Z86.711 HISTORY OF PULMONARY EMBOLISM: ICD-10-CM

## 2019-05-02 RX ORDER — WARFARIN SODIUM 5 MG/1
TABLET ORAL
Qty: 30 TABLET | Refills: 0 | Status: SHIPPED | OUTPATIENT
Start: 2019-05-02 | End: 2019-05-09

## 2019-05-02 NOTE — TELEPHONE ENCOUNTER
Prescription approved per [facility] Refill Protocol.  Anette Magana RN

## 2019-05-02 NOTE — TELEPHONE ENCOUNTER
"Requested Prescriptions   Pending Prescriptions Disp Refills     warfarin (COUMADIN) 5 MG tablet [Pharmacy Med Name: WARFARIN SODIUM 5 MG TABLET] 30 tablet 0     Sig: TAKE 1 TABLET BY MOUTH EVERY DAY     Last Written Prescription Date:  04/23/2019  Last Fill Quantity: 32 tablet,  # refills: 1   Last office visit: 4/24/2019 with prescribing provider:  05/24/2018   Future Office Visit:          Vitamin K Antagonists Failed - 5/2/2019  1:44 AM        Failed - INR is within goal in the past 6 weeks     Confirm INR is within goal in the past 6 weeks.     Recent Labs   Lab Test 04/23/19   INR 1.9*                       Passed - Recent (12 mo) or future (30 days) visit within the authorizing provider's specialty     Patient had office visit in the last 12 months or has a visit in the next 30 days with authorizing provider or within the authorizing provider's specialty.  See \"Patient Info\" tab in inbasket, or \"Choose Columns\" in Meds & Orders section of the refill encounter.              Passed - Medication is active on med list        Passed - Patient is 18 years of age or older          Lázaro JACOME  "

## 2019-05-09 ENCOUNTER — ANTICOAGULATION THERAPY VISIT (OUTPATIENT)
Dept: NURSING | Facility: CLINIC | Age: 54
End: 2019-05-09
Payer: COMMERCIAL

## 2019-05-09 LAB — INR POINT OF CARE: 2.5 (ref 0.86–1.14)

## 2019-05-09 PROCEDURE — 36416 COLLJ CAPILLARY BLOOD SPEC: CPT

## 2019-05-09 PROCEDURE — 99207 ZZC NO CHARGE NURSE ONLY: CPT

## 2019-05-09 PROCEDURE — 85610 PROTHROMBIN TIME: CPT | Mod: QW

## 2019-05-09 NOTE — PROGRESS NOTES
ANTICOAGULATION FOLLOW-UP CLINIC VISIT    Patient Name:  Schuyler Castro  Date:  2019  Contact Type:  Face to Face    SUBJECTIVE:  Patient Findings     Comments:   The patient was assessed for diet, medication, and activity level changes, missed or extra doses, bruising or bleeding, with no problem findings.          Clinical Outcomes     Negatives:   Major bleeding event, Thromboembolic event, Anticoagulation-related hospital admission, Anticoagulation-related ED visit, Anticoagulation-related fatality    Comments:   The patient was assessed for diet, medication, and activity level changes, missed or extra doses, bruising or bleeding, with no problem findings.             OBJECTIVE    INR Protime   Date Value Ref Range Status   2019 2.5 (A) 0.86 - 1.14 Final       ASSESSMENT / PLAN  INR assessment THER    Recheck INR In: 4 WEEKS    INR Location Clinic      Anticoagulation Summary  As of 2019    INR goal:   2.0-3.0   TTR:   88.3 % (3.3 y)   INR used for dosin.5 (2019)   Warfarin maintenance plan:   7.5 mg (5 mg x 1.5) every Tue; 5 mg (5 mg x 1) all other days   Full warfarin instructions:   7.5 mg every Tue; 5 mg all other days   Weekly warfarin total:   37.5 mg   No change documented:   Lyly Florian RN   Plan last modified:   Lyly Florian RN (2019)   Next INR check:   6/3/2019   Target end date:       Indications    Long-term (current) use of anticoagulants [Z79.01] [Z79.01]             Anticoagulation Episode Summary     INR check location:       Preferred lab:       Send INR reminders to:   [facility]    Comments:         Anticoagulation Care Providers     Provider Role Specialty Phone number    Rasheeda Juan MD [facility] [phone]            See the Encounter Report to view Anticoagulation Flowsheet and Dosing Calendar (Go to Encounters tab in chart review, and find the Anticoagulation Therapy Visit)        Lyly Florian RN

## 2019-06-18 ENCOUNTER — ANTICOAGULATION THERAPY VISIT (OUTPATIENT)
Dept: NURSING | Facility: CLINIC | Age: 54
End: 2019-06-18
Payer: COMMERCIAL

## 2019-06-18 DIAGNOSIS — Z86.718 PERSONAL HISTORY OF DVT (DEEP VEIN THROMBOSIS): ICD-10-CM

## 2019-06-18 DIAGNOSIS — Z79.01 LONG TERM CURRENT USE OF ANTICOAGULANT THERAPY: ICD-10-CM

## 2019-06-18 DIAGNOSIS — I26.99 OTHER ACUTE PULMONARY EMBOLISM WITHOUT ACUTE COR PULMONALE (H): ICD-10-CM

## 2019-06-18 LAB — INR POINT OF CARE: 1.9 (ref 0.86–1.14)

## 2019-06-18 PROCEDURE — 99207 ZZC NO CHARGE NURSE ONLY: CPT

## 2019-06-18 PROCEDURE — 36416 COLLJ CAPILLARY BLOOD SPEC: CPT

## 2019-06-18 PROCEDURE — 85610 PROTHROMBIN TIME: CPT | Mod: QW

## 2019-06-18 NOTE — PROGRESS NOTES
ANTICOAGULATION FOLLOW-UP CLINIC VISIT    Patient Name:  Schuyler Castro  Date:  2019  Contact Type:  Face to Face    SUBJECTIVE:  Patient Findings     Positives:   Change in diet/appetite (More greens in the past week. Pt states he generally does not eat greens so I encouraged consistency and small portions.)        Clinical Outcomes     Negatives:   Major bleeding event, Thromboembolic event, Anticoagulation-related hospital admission, Anticoagulation-related ED visit, Anticoagulation-related fatality           OBJECTIVE    INR Protime   Date Value Ref Range Status   2019 1.9 (A) 0.86 - 1.14 Final       ASSESSMENT / PLAN  INR assessment SUB    Recheck INR In: 2 WEEKS    INR Location Clinic      Anticoagulation Summary  As of 2019    INR goal:   2.0-3.0   TTR:   88.1 % (3.4 y)   INR used for dosin.9! (2019)   Warfarin maintenance plan:   7.5 mg (5 mg x 1.5) every Tue; 5 mg (5 mg x 1) all other days   Full warfarin instructions:   : 10 mg; Otherwise 7.5 mg every Tue; 5 mg all other days   Weekly warfarin total:   37.5 mg   Plan last modified:   Lyly Florian RN (2019)   Next INR check:   2019   Target end date:       Indications    Long-term (current) use of anticoagulants [Z79.01] [Z79.01]  Other acute pulmonary embolism without acute cor pulmonale (H) [I26.99]  Personal history of DVT (deep vein thrombosis) [Z86.718]             Anticoagulation Episode Summary     INR check location:       Preferred lab:       Send INR reminders to:    [facility]    Comments:         Anticoagulation Care Providers     Provider Role Specialty Phone number    Rasheeda Juan MD [facility] [phone]            See the Encounter Report to view Anticoagulation Flowsheet and Dosing Calendar (Go to Encounters tab in chart review, and find the Anticoagulation Therapy Visit)        Lyly Florian RN

## 2019-06-25 DIAGNOSIS — Z79.01 ANTICOAGULATION MONITORING, INR RANGE 2-3: ICD-10-CM

## 2019-06-25 DIAGNOSIS — Z86.711 HISTORY OF PULMONARY EMBOLISM: ICD-10-CM

## 2019-06-25 DIAGNOSIS — Z86.718 PERSONAL HISTORY OF DVT (DEEP VEIN THROMBOSIS): ICD-10-CM

## 2019-06-25 RX ORDER — WARFARIN SODIUM 5 MG/1
TABLET ORAL
Qty: 32 TABLET | Refills: 1 | Status: SHIPPED | OUTPATIENT
Start: 2019-06-25 | End: 2019-07-30

## 2019-06-25 NOTE — TELEPHONE ENCOUNTER
"Requested Prescriptions   Pending Prescriptions Disp Refills     warfarin (COUMADIN) 5 MG tablet [Pharmacy Med Name: WARFARIN SODIUM 5 MG TABLET] 32 tablet 1     Sig: TAKE 7.5MG EVERY TU / TAKE 5MG ALL OTHER DAYS OR AS INSTRUCTED BY INR CLINIC     Last Written Prescription Date:  04/23/2019  Last Fill Quantity: 32 tablet,  # refills: 1   Last office visit: 4/24/2019 with prescribing provider:  05/24/2018   Future Office Visit:          Vitamin K Antagonists Failed - 6/25/2019 10:57 AM        Failed - INR is within goal in the past 6 weeks     Confirm INR is within goal in the past 6 weeks.     Recent Labs   Lab Test 06/18/19   INR 1.9*                       Passed - Recent (12 mo) or future (30 days) visit within the authorizing provider's specialty     Patient had office visit in the last 12 months or has a visit in the next 30 days with authorizing provider or within the authorizing provider's specialty.  See \"Patient Info\" tab in inbasket, or \"Choose Columns\" in Meds & Orders section of the refill encounter.              Passed - Medication is active on med list        Passed - Patient is 18 years of age or older        Lázaro OSMANT  "

## 2019-06-25 NOTE — TELEPHONE ENCOUNTER
Prescription approved per [facility] Refill Protocol.  Anahi Garcia RN, BSN

## 2019-07-02 ENCOUNTER — ANTICOAGULATION THERAPY VISIT (OUTPATIENT)
Dept: NURSING | Facility: CLINIC | Age: 54
End: 2019-07-02
Payer: COMMERCIAL

## 2019-07-02 DIAGNOSIS — Z86.718 PERSONAL HISTORY OF DVT (DEEP VEIN THROMBOSIS): ICD-10-CM

## 2019-07-02 DIAGNOSIS — Z79.01 LONG TERM CURRENT USE OF ANTICOAGULANT THERAPY: ICD-10-CM

## 2019-07-02 DIAGNOSIS — I26.99 OTHER ACUTE PULMONARY EMBOLISM WITHOUT ACUTE COR PULMONALE (H): ICD-10-CM

## 2019-07-02 LAB — INR POINT OF CARE: 2.1 (ref 0.86–1.14)

## 2019-07-02 PROCEDURE — 85610 PROTHROMBIN TIME: CPT | Mod: QW

## 2019-07-02 PROCEDURE — 36416 COLLJ CAPILLARY BLOOD SPEC: CPT

## 2019-07-02 PROCEDURE — 99207 ZZC NO CHARGE NURSE ONLY: CPT

## 2019-07-02 NOTE — PROGRESS NOTES
ANTICOAGULATION FOLLOW-UP CLINIC VISIT    Patient Name:  Schuyler Castro  Date:  2019  Contact Type:  Face to Face    SUBJECTIVE:  Patient Findings     Comments:   The patient was assessed for diet, medication, and activity level changes, missed or extra doses, bruising or bleeding, with no problem findings.          Clinical Outcomes     Negatives:   Major bleeding event, Thromboembolic event, Anticoagulation-related hospital admission, Anticoagulation-related ED visit, Anticoagulation-related fatality    Comments:   The patient was assessed for diet, medication, and activity level changes, missed or extra doses, bruising or bleeding, with no problem findings.             OBJECTIVE    INR Protime   Date Value Ref Range Status   2019 2.1 (A) 0.86 - 1.14 Final       ASSESSMENT / PLAN  INR assessment THER    Recheck INR In: 4 WEEKS    INR Location Clinic      Anticoagulation Summary  As of 2019    INR goal:   2.0-3.0   TTR:   87.7 % (3.5 y)   INR used for dosin.1 (2019)   Warfarin maintenance plan:   7.5 mg (5 mg x 1.5) every Tue; 5 mg (5 mg x 1) all other days   Full warfarin instructions:   7.5 mg every Tue; 5 mg all other days   Weekly warfarin total:   37.5 mg   No change documented:   Lyly Florian, RN   Plan last modified:   Lyly Florian RN (2019)   Next INR check:   2019   Target end date:       Indications    Long-term (current) use of anticoagulants [Z79.01] [Z79.01]  Other acute pulmonary embolism without acute cor pulmonale (H) [I26.99]  Personal history of DVT (deep vein thrombosis) [Z86.718]             Anticoagulation Episode Summary     INR check location:       Preferred lab:       Send INR reminders to:   [facility]    Comments:         Anticoagulation Care Providers     Provider Role Specialty Phone number    Rasheeda Juan MD [facility] [phone]            See the Encounter Report to view Anticoagulation Flowsheet and Dosing Calendar  (Go to Encounters tab in chart review, and find the Anticoagulation Therapy Visit)        Lyly Florian RN

## 2019-07-30 ENCOUNTER — ANTICOAGULATION THERAPY VISIT (OUTPATIENT)
Dept: NURSING | Facility: CLINIC | Age: 54
End: 2019-07-30
Payer: COMMERCIAL

## 2019-07-30 DIAGNOSIS — Z86.718 PERSONAL HISTORY OF DVT (DEEP VEIN THROMBOSIS): ICD-10-CM

## 2019-07-30 DIAGNOSIS — Z79.01 LONG TERM CURRENT USE OF ANTICOAGULANTS WITH INR GOAL OF 2.0-3.0: Primary | ICD-10-CM

## 2019-07-30 DIAGNOSIS — Z79.01 LONG TERM CURRENT USE OF ANTICOAGULANT THERAPY: ICD-10-CM

## 2019-07-30 DIAGNOSIS — Z86.711 HISTORY OF PULMONARY EMBOLISM: ICD-10-CM

## 2019-07-30 DIAGNOSIS — I26.99 OTHER ACUTE PULMONARY EMBOLISM WITHOUT ACUTE COR PULMONALE (H): ICD-10-CM

## 2019-07-30 DIAGNOSIS — Z79.01 ANTICOAGULATION MONITORING, INR RANGE 2-3: ICD-10-CM

## 2019-07-30 LAB — INR POINT OF CARE: 2.4 (ref 0.86–1.14)

## 2019-07-30 PROCEDURE — 85610 PROTHROMBIN TIME: CPT | Mod: QW

## 2019-07-30 PROCEDURE — 36416 COLLJ CAPILLARY BLOOD SPEC: CPT

## 2019-07-30 PROCEDURE — 99207 ZZC NO CHARGE NURSE ONLY: CPT

## 2019-07-30 RX ORDER — WARFARIN SODIUM 5 MG/1
TABLET ORAL
Qty: 94 TABLET | Refills: 1 | Status: SHIPPED | OUTPATIENT
Start: 2019-07-30 | End: 2019-12-23

## 2019-07-30 NOTE — PROGRESS NOTES
ANTICOAGULATION FOLLOW-UP CLINIC VISIT    Patient Name:  Schuyler Castro  Date:  2019  Contact Type:  Face to Face    SUBJECTIVE:  Patient Findings     Comments:   The patient was assessed for diet, medication, and activity level changes, missed or extra doses, bruising or bleeding, with no problem findings.          Clinical Outcomes     Negatives:   Major bleeding event, Thromboembolic event, Anticoagulation-related hospital admission, Anticoagulation-related ED visit, Anticoagulation-related fatality    Comments:   The patient was assessed for diet, medication, and activity level changes, missed or extra doses, bruising or bleeding, with no problem findings.             OBJECTIVE    INR Protime   Date Value Ref Range Status   2019 2.4 (A) 0.86 - 1.14 Final       ASSESSMENT / PLAN  INR assessment THER    Recheck INR In: 6 WEEKS    INR Location Clinic      Anticoagulation Summary  As of 2019    INR goal:   2.0-3.0   TTR:   87.9 % (3.5 y)   INR used for dosin.4 (2019)   Warfarin maintenance plan:   7.5 mg (5 mg x 1.5) every Tue; 5 mg (5 mg x 1) all other days   Full warfarin instructions:   7.5 mg every Tue; 5 mg all other days   Weekly warfarin total:   37.5 mg   No change documented:   Lyly Florian, RN   Plan last modified:   Lyly Florian RN (2019)   Next INR check:   9/10/2019   Target end date:       Indications    Long-term (current) use of anticoagulants [Z79.01] [Z79.01]  Other acute pulmonary embolism without acute cor pulmonale (H) [I26.99]  Personal history of DVT (deep vein thrombosis) [Z86.718]             Anticoagulation Episode Summary     INR check location:       Preferred lab:       Send INR reminders to:   [facility]    Comments:         Anticoagulation Care Providers     Provider Role Specialty Phone number    Rasheeda Juan MD [facility] [phone]            See the Encounter Report to view Anticoagulation Flowsheet and Dosing Calendar  (Go to Encounters tab in chart review, and find the Anticoagulation Therapy Visit)        Lyly Florian RN

## 2019-08-08 ENCOUNTER — MYC MEDICAL ADVICE (OUTPATIENT)
Dept: FAMILY MEDICINE | Facility: CLINIC | Age: 54
End: 2019-08-08

## 2019-09-16 ENCOUNTER — ANTICOAGULATION THERAPY VISIT (OUTPATIENT)
Dept: NURSING | Facility: CLINIC | Age: 54
End: 2019-09-16
Payer: COMMERCIAL

## 2019-09-16 DIAGNOSIS — I26.99 OTHER ACUTE PULMONARY EMBOLISM WITHOUT ACUTE COR PULMONALE (H): ICD-10-CM

## 2019-09-16 DIAGNOSIS — Z79.01 LONG TERM CURRENT USE OF ANTICOAGULANT THERAPY: ICD-10-CM

## 2019-09-16 DIAGNOSIS — Z86.718 PERSONAL HISTORY OF DVT (DEEP VEIN THROMBOSIS): ICD-10-CM

## 2019-09-16 LAB — INR POINT OF CARE: 2.4 (ref 0.86–1.14)

## 2019-09-16 PROCEDURE — 36416 COLLJ CAPILLARY BLOOD SPEC: CPT

## 2019-09-16 PROCEDURE — 99207 ZZC NO CHARGE NURSE ONLY: CPT

## 2019-09-16 PROCEDURE — 85610 PROTHROMBIN TIME: CPT | Mod: QW

## 2019-09-16 NOTE — PROGRESS NOTES
ANTICOAGULATION FOLLOW-UP CLINIC VISIT    Patient Name:  Schuyler Castro  Date:  2019  Contact Type:  Face to Face    SUBJECTIVE:  Patient Findings     Comments:   The patient was assessed for diet, medication, and activity level changes, missed or extra doses, bruising or bleeding, with no problem findings.          Clinical Outcomes     Negatives:   Major bleeding event, Thromboembolic event, Anticoagulation-related hospital admission, Anticoagulation-related ED visit, Anticoagulation-related fatality    Comments:   The patient was assessed for diet, medication, and activity level changes, missed or extra doses, bruising or bleeding, with no problem findings.             OBJECTIVE    INR Protime   Date Value Ref Range Status   2019 2.4 (A) 0.86 - 1.14 Final       ASSESSMENT / PLAN  INR assessment THER    Recheck INR In: 6 WEEKS    INR Location Clinic      Anticoagulation Summary  As of 2019    INR goal:   2.0-3.0   TTR:   88.4 % (3.7 y)   INR used for dosin.4 (2019)   Warfarin maintenance plan:   7.5 mg (5 mg x 1.5) every Tue; 5 mg (5 mg x 1) all other days   Full warfarin instructions:   7.5 mg every Tue; 5 mg all other days   Weekly warfarin total:   37.5 mg   No change documented:   Lyly Florian, RN   Plan last modified:   Lyly Florian RN (2019)   Next INR check:   10/28/2019   Target end date:       Indications    Long-term (current) use of anticoagulants [Z79.01] [Z79.01]  Other acute pulmonary embolism without acute cor pulmonale (H) [I26.99]  Personal history of DVT (deep vein thrombosis) [Z86.718]             Anticoagulation Episode Summary     INR check location:       Preferred lab:       Send INR reminders to:   [facility]    Comments:         Anticoagulation Care Providers     Provider Role Specialty Phone number    Rasheeda Juan MD [facility] [phone]            See the Encounter Report to view Anticoagulation Flowsheet and Dosing  Calendar (Go to Encounters tab in chart review, and find the Anticoagulation Therapy Visit)        Lyly Florian RN

## 2019-10-28 ENCOUNTER — ANTICOAGULATION THERAPY VISIT (OUTPATIENT)
Dept: NURSING | Facility: CLINIC | Age: 54
End: 2019-10-28
Payer: COMMERCIAL

## 2019-10-28 DIAGNOSIS — I26.99 OTHER ACUTE PULMONARY EMBOLISM WITHOUT ACUTE COR PULMONALE (H): ICD-10-CM

## 2019-10-28 DIAGNOSIS — Z79.01 LONG TERM CURRENT USE OF ANTICOAGULANT THERAPY: ICD-10-CM

## 2019-10-28 DIAGNOSIS — Z86.718 PERSONAL HISTORY OF DVT (DEEP VEIN THROMBOSIS): ICD-10-CM

## 2019-10-28 LAB — INR POINT OF CARE: 2.4 (ref 0.86–1.14)

## 2019-10-28 PROCEDURE — 85610 PROTHROMBIN TIME: CPT | Mod: QW

## 2019-10-28 PROCEDURE — 36416 COLLJ CAPILLARY BLOOD SPEC: CPT

## 2019-10-28 PROCEDURE — 99207 ZZC NO CHARGE NURSE ONLY: CPT

## 2019-10-28 NOTE — PROGRESS NOTES
ANTICOAGULATION FOLLOW-UP CLINIC VISIT    Patient Name:  Schuyler Castro  Date:  10/28/2019  Contact Type:  Face to Face    SUBJECTIVE:  Patient Findings     Comments:   The patient was assessed for diet, medication, and activity level changes, missed or extra doses, bruising or bleeding, with no problem findings.          Clinical Outcomes     Negatives:   Major bleeding event, Thromboembolic event, Anticoagulation-related hospital admission, Anticoagulation-related ED visit, Anticoagulation-related fatality    Comments:   The patient was assessed for diet, medication, and activity level changes, missed or extra doses, bruising or bleeding, with no problem findings.             OBJECTIVE    INR Protime   Date Value Ref Range Status   10/28/2019 2.4 (A) 0.86 - 1.14 Final       ASSESSMENT / PLAN  INR assessment THER    Recheck INR In: 6 WEEKS    INR Location Clinic      Anticoagulation Summary  As of 10/28/2019    INR goal:   2.0-3.0   TTR:   88.7 % (3.8 y)   INR used for dosin.4 (10/28/2019)   Warfarin maintenance plan:   7.5 mg (5 mg x 1.5) every Tue; 5 mg (5 mg x 1) all other days   Full warfarin instructions:   7.5 mg every Tue; 5 mg all other days   Weekly warfarin total:   37.5 mg   No change documented:   Lyly Florian, RN   Plan last modified:   Lyly Florian RN (2019)   Next INR check:   2019   Target end date:       Indications    Long-term (current) use of anticoagulants [Z79.01] [Z79.01]  Other acute pulmonary embolism without acute cor pulmonale (H) [I26.99]  Personal history of DVT (deep vein thrombosis) [Z86.718]             Anticoagulation Episode Summary     INR check location:       Preferred lab:       Send INR reminders to:   [facility]    Comments:         Anticoagulation Care Providers     Provider Role Specialty Phone number    Rasheeda Juan MD [facility] [phone]            See the Encounter Report to view Anticoagulation Flowsheet and Dosing  Calendar (Go to Encounters tab in chart review, and find the Anticoagulation Therapy Visit)        Lyly Florian RN

## 2019-11-05 ENCOUNTER — HEALTH MAINTENANCE LETTER (OUTPATIENT)
Age: 54
End: 2019-11-05

## 2019-12-23 ENCOUNTER — ANTICOAGULATION THERAPY VISIT (OUTPATIENT)
Dept: NURSING | Facility: CLINIC | Age: 54
End: 2019-12-23
Payer: COMMERCIAL

## 2019-12-23 DIAGNOSIS — Z79.01 LONG TERM CURRENT USE OF ANTICOAGULANT THERAPY: ICD-10-CM

## 2019-12-23 DIAGNOSIS — Z79.01 LONG TERM CURRENT USE OF ANTICOAGULANTS WITH INR GOAL OF 2.0-3.0: ICD-10-CM

## 2019-12-23 DIAGNOSIS — I26.99 OTHER ACUTE PULMONARY EMBOLISM WITHOUT ACUTE COR PULMONALE (H): ICD-10-CM

## 2019-12-23 DIAGNOSIS — Z86.711 HISTORY OF PULMONARY EMBOLISM: ICD-10-CM

## 2019-12-23 DIAGNOSIS — Z86.718 PERSONAL HISTORY OF DVT (DEEP VEIN THROMBOSIS): ICD-10-CM

## 2019-12-23 LAB — INR POINT OF CARE: 2 (ref 0.86–1.14)

## 2019-12-23 PROCEDURE — 85610 PROTHROMBIN TIME: CPT | Mod: QW

## 2019-12-23 PROCEDURE — 99207 ZZC NO CHARGE NURSE ONLY: CPT

## 2019-12-23 PROCEDURE — 36416 COLLJ CAPILLARY BLOOD SPEC: CPT

## 2019-12-23 RX ORDER — WARFARIN SODIUM 5 MG/1
TABLET ORAL
Qty: 94 TABLET | Refills: 1 | Status: SHIPPED | OUTPATIENT
Start: 2019-12-23 | End: 2020-06-11

## 2019-12-23 NOTE — PROGRESS NOTES
ANTICOAGULATION FOLLOW-UP CLINIC VISIT    Patient Name:  Schuyler Castro  Date:  2019  Contact Type:  Face to Face    SUBJECTIVE:  Patient Findings     Positives:   Change in activity (More active at work in the past week), Missed doses (Missed 1 dose about 3 weeks ago but he took it the next morning.)        Clinical Outcomes     Negatives:   Major bleeding event, Thromboembolic event, Anticoagulation-related hospital admission, Anticoagulation-related ED visit, Anticoagulation-related fatality           OBJECTIVE    INR Protime   Date Value Ref Range Status   2019 2.0 (A) 0.86 - 1.14 Final       ASSESSMENT / PLAN  INR assessment THER    Recheck INR In: 4 WEEKS    INR Location Clinic      Anticoagulation Summary  As of 2019    INR goal:   2.0-3.0   TTR:   70.0 % (11.4 mo)   INR used for dosin.0 (2019)   Warfarin maintenance plan:   7.5 mg (5 mg x 1.5) every Tue; 5 mg (5 mg x 1) all other days   Full warfarin instructions:   7.5 mg every Tue; 5 mg all other days   Weekly warfarin total:   37.5 mg   No change documented:   Lyly Florian RN   Plan last modified:   Lyly Florian RN (2019)   Next INR check:   2020   Target end date:       Indications    Long-term (current) use of anticoagulants [Z79.01] [Z79.01]  Other acute pulmonary embolism without acute cor pulmonale (H) [I26.99]  Personal history of DVT (deep vein thrombosis) [Z86.718]             Anticoagulation Episode Summary     INR check location:       Preferred lab:       Send INR reminders to:   [facility]    Comments:         Anticoagulation Care Providers     Provider Role Specialty Phone number    Rasheeda Juan MD [facility] [phone]            See the Encounter Report to view Anticoagulation Flowsheet and Dosing Calendar (Go to Encounters tab in chart review, and find the Anticoagulation Therapy Visit)        Lyly Florian RN

## 2020-01-24 ENCOUNTER — ANTICOAGULATION THERAPY VISIT (OUTPATIENT)
Dept: NURSING | Facility: CLINIC | Age: 55
End: 2020-01-24
Payer: COMMERCIAL

## 2020-01-24 DIAGNOSIS — I26.99 OTHER ACUTE PULMONARY EMBOLISM WITHOUT ACUTE COR PULMONALE (H): ICD-10-CM

## 2020-01-24 DIAGNOSIS — Z86.718 PERSONAL HISTORY OF DVT (DEEP VEIN THROMBOSIS): ICD-10-CM

## 2020-01-24 DIAGNOSIS — Z79.01 LONG TERM CURRENT USE OF ANTICOAGULANT THERAPY: ICD-10-CM

## 2020-01-24 LAB — INR POINT OF CARE: 2.2 (ref 0.86–1.14)

## 2020-01-24 PROCEDURE — 85610 PROTHROMBIN TIME: CPT | Mod: QW

## 2020-01-24 PROCEDURE — 36416 COLLJ CAPILLARY BLOOD SPEC: CPT

## 2020-01-24 PROCEDURE — 99207 ZZC NO CHARGE NURSE ONLY: CPT

## 2020-01-24 NOTE — PROGRESS NOTES
ANTICOAGULATION FOLLOW-UP CLINIC VISIT    Patient Name:  Schuyler Castro  Date:  2020  Contact Type:  Face to Face    SUBJECTIVE:  Patient Findings     Positives:   Change in diet/appetite (Eating 2-3 greens per week)        Clinical Outcomes     Negatives:   Major bleeding event, Thromboembolic event, Anticoagulation-related hospital admission, Anticoagulation-related ED visit, Anticoagulation-related fatality           OBJECTIVE    INR Protime   Date Value Ref Range Status   2020 2.2 (A) 0.86 - 1.14 Final       ASSESSMENT / PLAN  INR assessment THER    Recheck INR In: 6 WEEKS    INR Location Clinic      Anticoagulation Summary  As of 2020    INR goal:   2.0-3.0   TTR:   74.6 % (1 y)   INR used for dosin.2 (2020)   Warfarin maintenance plan:   7.5 mg (5 mg x 1.5) every Tue; 5 mg (5 mg x 1) all other days   Full warfarin instructions:   7.5 mg every Tue; 5 mg all other days   Weekly warfarin total:   37.5 mg   No change documented:   Lyly Florian RN   Plan last modified:   Lyly Florian RN (2019)   Next INR check:   3/6/2020   Priority:   Maintenance   Target end date:       Indications    Long-term (current) use of anticoagulants [Z79.01] [Z79.01]  Other acute pulmonary embolism without acute cor pulmonale (H) [I26.99]  Personal history of DVT (deep vein thrombosis) [Z86.718]             Anticoagulation Episode Summary     INR check location:       Preferred lab:       Send INR reminders to:   [facility]    Comments:         Anticoagulation Care Providers     Provider Role Specialty Phone number    Rasheeda Juan MD [facility] [phone]            See the Encounter Report to view Anticoagulation Flowsheet and Dosing Calendar (Go to Encounters tab in chart review, and find the Anticoagulation Therapy Visit)        Lyly Florian RN

## 2020-03-16 ENCOUNTER — ANTICOAGULATION THERAPY VISIT (OUTPATIENT)
Dept: NURSING | Facility: CLINIC | Age: 55
End: 2020-03-16
Payer: COMMERCIAL

## 2020-03-16 DIAGNOSIS — Z79.01 LONG TERM CURRENT USE OF ANTICOAGULANT THERAPY: ICD-10-CM

## 2020-03-16 DIAGNOSIS — Z86.718 PERSONAL HISTORY OF DVT (DEEP VEIN THROMBOSIS): ICD-10-CM

## 2020-03-16 DIAGNOSIS — I26.99 OTHER ACUTE PULMONARY EMBOLISM WITHOUT ACUTE COR PULMONALE (H): ICD-10-CM

## 2020-03-16 LAB — INR POINT OF CARE: 2.2 (ref 0.86–1.14)

## 2020-03-16 PROCEDURE — 99207 ZZC NO CHARGE NURSE ONLY: CPT

## 2020-03-16 PROCEDURE — 85610 PROTHROMBIN TIME: CPT | Mod: QW

## 2020-03-16 PROCEDURE — 36416 COLLJ CAPILLARY BLOOD SPEC: CPT

## 2020-03-16 NOTE — PROGRESS NOTES
ANTICOAGULATION FOLLOW-UP CLINIC VISIT    Patient Name:  Schuyler Castro  Date:  3/16/2020  Contact Type:  Face to Face    SUBJECTIVE:  Patient Findings     Positives:   Change in diet/appetite (more salads)    Comments:   Assessed for S/S bleeding, clotting, medication, diet, health, activity and alcohol changes.          Clinical Outcomes     Negatives:   Major bleeding event, Thromboembolic event, Anticoagulation-related hospital admission, Anticoagulation-related ED visit, Anticoagulation-related fatality    Comments:   Assessed for S/S bleeding, clotting, medication, diet, health, activity and alcohol changes.             OBJECTIVE    INR Protime   Date Value Ref Range Status   2020 2.2 (A) 0.86 - 1.14 Final       ASSESSMENT / PLAN  INR assessment THER    Recheck INR In: 12 WEEKS    INR Location Clinic      Anticoagulation Summary  As of 3/16/2020    INR goal:   2.0-3.0   TTR:   88.8 % (1 y)   INR used for dosin.2 (3/16/2020)   Warfarin maintenance plan:   7.5 mg (5 mg x 1.5) every Tue; 5 mg (5 mg x 1) all other days   Full warfarin instructions:   7.5 mg every Tue; 5 mg all other days   Weekly warfarin total:   37.5 mg   No change documented:   Mariam Dockery [facility]   Plan last modified:   Lyly Florian RN (2019)   Next INR check:   2020   Priority:   Maintenance   Target end date:       Indications    Long-term (current) use of anticoagulants [Z79.01] [Z79.01]  Other acute pulmonary embolism without acute cor pulmonale (H) [I26.99]  Personal history of DVT (deep vein thrombosis) [Z86.718]             Anticoagulation Episode Summary     INR check location:       Preferred lab:       Send INR reminders to:   [facility] [place]    Comments:         Anticoagulation Care Providers     Provider Role Specialty Phone number    Rasheeda Juan MD [facility] [phone]            See the Encounter Report to view Anticoagulation Flowsheet and Dosing Calendar (Go to Encounters  tab in chart review, and find the Anticoagulation Therapy Visit)    IN range, will recheck in 12 weeks, starting home meter application.    Mariam Dockery RPH

## 2020-03-19 DIAGNOSIS — Z79.01 LONG TERM CURRENT USE OF ANTICOAGULANT THERAPY: ICD-10-CM

## 2020-03-19 DIAGNOSIS — Z86.718 PERSONAL HISTORY OF DVT (DEEP VEIN THROMBOSIS): Primary | ICD-10-CM

## 2020-04-09 ENCOUNTER — TELEPHONE (OUTPATIENT)
Dept: FAMILY MEDICINE | Facility: CLINIC | Age: 55
End: 2020-04-09

## 2020-04-09 DIAGNOSIS — Z86.718 PERSONAL HISTORY OF DVT (DEEP VEIN THROMBOSIS): Primary | ICD-10-CM

## 2020-04-09 DIAGNOSIS — Z79.01 LONG TERM CURRENT USE OF ANTICOAGULANTS WITH INR GOAL OF 2.0-3.0: ICD-10-CM

## 2020-04-09 DIAGNOSIS — Z86.711 HISTORY OF PULMONARY EMBOLISM: ICD-10-CM

## 2020-04-14 ENCOUNTER — TELEPHONE (OUTPATIENT)
Dept: FAMILY MEDICINE | Facility: CLINIC | Age: 55
End: 2020-04-14

## 2020-05-11 DIAGNOSIS — E11.9 TYPE 2 DIABETES MELLITUS WITHOUT COMPLICATION, WITHOUT LONG-TERM CURRENT USE OF INSULIN (H): ICD-10-CM

## 2020-05-12 RX ORDER — GLIPIZIDE 10 MG/1
TABLET, FILM COATED, EXTENDED RELEASE ORAL
Qty: 180 TABLET | Refills: 3 | OUTPATIENT
Start: 2020-05-12

## 2020-05-12 NOTE — TELEPHONE ENCOUNTER
Routing refill request to provider for review/approval because:  Patient needs to be seen because:  Past due for DM kareem  Patient needs to be seen because it has been more than 1 year since last office visit.      Please call to schedule    Anahi Garcia RN, BSN

## 2020-06-08 ENCOUNTER — TELEPHONE (OUTPATIENT)
Dept: FAMILY MEDICINE | Facility: CLINIC | Age: 55
End: 2020-06-08

## 2020-06-08 NOTE — TELEPHONE ENCOUNTER
Pt called back and states he will most likely come in for INR on 06/16 but will call clinic back shortly to schedule.  Pt also informed me that he is no longer interested in home INR testing, FYI sent to Sybil Nathan.    Lyly Florian RN

## 2020-06-08 NOTE — TELEPHONE ENCOUNTER
Schuyler Castro is overdue for INR check.      Spoke with David and scheduled INR appointment on 06/29/20.  Pt moved his INR appointment out to 06/29/20. I informed pt that the national guideline is not to go beyond 12 weeks for INR monitoring.  Pt states he may be able come in next week but will call clinic back later.    Lyly Florian RN

## 2020-06-08 NOTE — TELEPHONE ENCOUNTER
Pt states he is no longer interested in pursuing home INR monitoring.    Routing to Sybil villafana FYI.    Lyly Florian RN

## 2020-06-11 DIAGNOSIS — Z79.01 LONG TERM CURRENT USE OF ANTICOAGULANTS WITH INR GOAL OF 2.0-3.0: ICD-10-CM

## 2020-06-11 DIAGNOSIS — I26.99 OTHER ACUTE PULMONARY EMBOLISM WITHOUT ACUTE COR PULMONALE (H): ICD-10-CM

## 2020-06-11 DIAGNOSIS — Z86.711 HISTORY OF PULMONARY EMBOLISM: ICD-10-CM

## 2020-06-11 DIAGNOSIS — Z86.718 PERSONAL HISTORY OF DVT (DEEP VEIN THROMBOSIS): ICD-10-CM

## 2020-06-11 RX ORDER — WARFARIN SODIUM 5 MG/1
TABLET ORAL
Qty: 94 TABLET | Refills: 1 | Status: SHIPPED | OUTPATIENT
Start: 2020-06-11 | End: 2020-12-17

## 2020-06-17 ENCOUNTER — VIRTUAL VISIT (OUTPATIENT)
Dept: FAMILY MEDICINE | Facility: CLINIC | Age: 55
End: 2020-06-17
Payer: COMMERCIAL

## 2020-06-17 DIAGNOSIS — I10 HYPERTENSION GOAL BP (BLOOD PRESSURE) < 140/90: ICD-10-CM

## 2020-06-17 DIAGNOSIS — E78.5 HYPERLIPIDEMIA LDL GOAL <100: ICD-10-CM

## 2020-06-17 DIAGNOSIS — E11.9 TYPE 2 DIABETES MELLITUS WITHOUT COMPLICATION, WITHOUT LONG-TERM CURRENT USE OF INSULIN (H): ICD-10-CM

## 2020-06-17 PROCEDURE — 99214 OFFICE O/P EST MOD 30 MIN: CPT | Mod: TEL | Performed by: PHYSICIAN ASSISTANT

## 2020-06-17 RX ORDER — GLIPIZIDE 10 MG/1
20 TABLET, FILM COATED, EXTENDED RELEASE ORAL DAILY
Qty: 180 TABLET | Refills: 0 | Status: SHIPPED | OUTPATIENT
Start: 2020-06-17 | End: 2020-09-15

## 2020-06-17 RX ORDER — SIMVASTATIN 20 MG
20 TABLET ORAL AT BEDTIME
Qty: 90 TABLET | Refills: 0 | Status: SHIPPED | OUTPATIENT
Start: 2020-06-17 | End: 2021-01-07

## 2020-06-17 RX ORDER — ATENOLOL 50 MG/1
50 TABLET ORAL DAILY
Qty: 90 TABLET | Refills: 0 | Status: SHIPPED | OUTPATIENT
Start: 2020-06-17 | End: 2021-04-08

## 2020-06-17 RX ORDER — LISINOPRIL AND HYDROCHLOROTHIAZIDE 12.5; 2 MG/1; MG/1
TABLET ORAL
Qty: 180 TABLET | Refills: 0 | Status: SHIPPED | OUTPATIENT
Start: 2020-06-17 | End: 2021-01-27

## 2020-06-17 NOTE — PROGRESS NOTES
"Schuyler Castro is a 54 year old male who is being evaluated via a billable telephone visit.      The patient has been notified of following:     \"This telephone visit will be conducted via a call between you and your physician/provider. We have found that certain health care needs can be provided without the need for a physical exam.  This service lets us provide the care you need with a short phone conversation.  If a prescription is necessary we can send it directly to your pharmacy.  If lab work is needed we can place an order for that and you can then stop by our lab to have the test done at a later time.    Telephone visits are billed at different rates depending on your insurance coverage. During this emergency period, for some insurers they may be billed the same as an in-person visit.  Please reach out to your insurance provider with any questions.    If during the course of the call the physician/provider feels a telephone visit is not appropriate, you will not be charged for this service.\"    Patient has given verbal consent for Telephone visit?  Yes    What phone number would you like to be contacted at? [phone]    How would you like to obtain your AVS? Aura Deleon     Schuyler Castro is a 54 year old male who presents via phone visit today for the following health issues:    History of Present Illness        Diabetes:   He presents for follow up of diabetes.  He is checking home blood glucose a few times a month. He checks blood glucose before meals and before and after meals.  Blood glucose is never over 200 and never under 70. When his blood glucose is low, the patient is asymptomatic for confusion, blurred vision, lethargy and reports not feeling dizzy, shaky, or weak.  He has no concerns regarding his diabetes at this time.  He is not experiencing numbness or burning in feet, excessive thirst, blurry vision, weight changes or redness, sores or blisters on feet. The patient has not " had a diabetic eye exam in the last 12 months.         He eats 0-1 servings of fruits and vegetables daily.He consumes 1 sweetened beverage(s) daily.He exercises with enough effort to increase his heart rate 20 to 29 minutes per day.  He exercises with enough effort to increase his heart rate 3 or less days per week.   He is taking medications regularly.    Diabetes Follow-up      How often are you checking your blood sugar? Not at all- his device and test strips are - would like to start testing again.    What concerns do you have today about your diabetes? None- everything seems good- will schedule follow-up with primary care provider in the next 3 months for diabetes check     Do you have any of these symptoms? (Select all that apply)  No numbness or tingling in feet.  No redness, sores or blisters on feet.  No complaints of excessive thirst.  No reports of blurry vision.  No significant changes to weight.    Have you had a diabetic eye exam in the last 12 months? No      Also needs refills of HTN meds, and cholesterol medication until appointment with primary care provider.       BP Readings from Last 2 Encounters:   19 132/82   18 128/76     Hemoglobin A1C (%)   Date Value   2019 9.5 (H)   2018 8.9 (H)     LDL Cholesterol Calculated (mg/dL)   Date Value   2019 80   2018 83         Patient Active Problem List   Diagnosis     Personal history of DVT (deep vein thrombosis)     Hyperlipidemia LDL goal <100     Hypertension goal BP (blood pressure) < 140/90     Type 2 diabetes mellitus without complication (H)     Long-term (current) use of anticoagulants [Z79.01]     Other acute pulmonary embolism without acute cor pulmonale (H)     Morbid obesity (H)     Seborrheic keratosis     AK (actinic keratosis)     Tinea pedis of both feet     History of pulmonary embolism     Long term current use of anticoagulants with INR goal of 2.0-3.0     History reviewed. No pertinent  surgical history.    Social History     Tobacco Use     Smoking status: Never Smoker     Smokeless tobacco: Never Used   Substance Use Topics     Alcohol use: Not Currently     Alcohol/week: 0.0 - 1.0 standard drinks     Family History   Problem Relation Age of Onset     Neurologic Disorder Mother         sage duran     Dementia Father          Current Outpatient Medications   Medication Sig Dispense Refill     atenolol (TENORMIN) 50 MG tablet Take 1 tablet (50 mg) by mouth daily 90 tablet 0     blood glucose (NO BRAND SPECIFIED) lancets standard Use to test blood sugar 2-3 times daily or as directed. 90 each 11     blood glucose (NO BRAND SPECIFIED) test strip Use to test blood sugar 2-3 times daily or as directed. 100 each 11     blood glucose monitoring (NO BRAND SPECIFIED) meter device kit Use to test blood sugar 2-3 times daily or as directed. 1 kit 0     glipiZIDE (GLUCOTROL XL) 10 MG 24 hr tablet Take 2 tablets (20 mg) by mouth daily 180 tablet 0     lisinopril-hydrochlorothiazide (ZESTORETIC) 20-12.5 MG tablet TAKE 2 TABLETS BY MOUTH DAILY 180 tablet 0     metFORMIN (GLUCOPHAGE) 500 MG tablet TAKE 2 TABLETS BY MOUTH TWICE A DAY WITH MEALS 360 tablet 0     simvastatin (ZOCOR) 20 MG tablet Take 1 tablet (20 mg) by mouth At Bedtime 90 tablet 0     Acetaminophen (TYLENOL PO) Take 650 mg by mouth       aspirin 81 MG tablet Take 1 tablet (81 mg) by mouth daily 30 tablet 12     blood glucose monitoring (NO BRAND SPECIFIED) meter device kit Use to test blood sugars 1-2 times daily or as directed. 1 kit 0     blood glucose monitoring (NO BRAND SPECIFIED) test strip Use to test blood sugars 1-2 times daily or as directed 100 strip 3     warfarin ANTICOAGULANT (COUMADIN) 5 MG tablet TAKE 1 & 1/2 TAB (7.5MG) EVERY TUES/ TAKE 1 TAB (5MG) ALL OTHER DAYS OR AS INSTRUCTED BY INR CLINIC 94 tablet 1     No Known Allergies    Reviewed and updated as needed this visit by Provider         Review of Systems    Constitutional, HEENT, cardiovascular, pulmonary, gi and gu systems are negative, except as otherwise noted.       Objective   Reported vitals:  There were no vitals taken for this visit.   healthy, alert and no distress  PSYCH: Alert and oriented times 3; coherent speech, normal   rate and volume, able to articulate logical thoughts, able   to abstract reason, no tangential thoughts, no hallucinations   or delusions  His affect is normal and pleasant  RESP: No cough, no audible wheezing, able to talk in full sentences  Remainder of exam unable to be completed due to telephone visits    Diagnostic Test Results:  none         Assessment/Plan:  1. Hypertension goal BP (blood pressure) < 140/90    Refilled medications until appointment with primary care provider.    - atenolol (TENORMIN) 50 MG tablet; Take 1 tablet (50 mg) by mouth daily  Dispense: 90 tablet; Refill: 0  - lisinopril-hydrochlorothiazide (ZESTORETIC) 20-12.5 MG tablet; TAKE 2 TABLETS BY MOUTH DAILY  Dispense: 180 tablet; Refill: 0    2. Type 2 diabetes mellitus without complication, without long-term current use of insulin (H)    Refilled medications until appointment with primary care provider. Ordered new testing supplies. Patient declined coming in for lab only prior to seeing primary care provider. His appointment should be face-to-face.     - glipiZIDE (GLUCOTROL XL) 10 MG 24 hr tablet; Take 2 tablets (20 mg) by mouth daily  Dispense: 180 tablet; Refill: 0  - metFORMIN (GLUCOPHAGE) 500 MG tablet; TAKE 2 TABLETS BY MOUTH TWICE A DAY WITH MEALS  Dispense: 360 tablet; Refill: 0  - blood glucose monitoring (NO BRAND SPECIFIED) meter device kit; Use to test blood sugar 2-3 times daily or as directed.  Dispense: 1 kit; Refill: 0  - blood glucose (NO BRAND SPECIFIED) lancets standard; Use to test blood sugar 2-3 times daily or as directed.  Dispense: 90 each; Refill: 11  - blood glucose (NO BRAND SPECIFIED) test strip; Use to test blood sugar 2-3 times  daily or as directed.  Dispense: 100 each; Refill: 11    3. Hyperlipidemia LDL goal <100    Refilled medication until appointment with primary care provider.    - simvastatin (ZOCOR) 20 MG tablet; Take 1 tablet (20 mg) by mouth At Bedtime  Dispense: 90 tablet; Refill: 0    No follow-ups on file.           Phone call duration:  5 minutes    Fabio Mensah PA-C

## 2020-06-17 NOTE — PATIENT INSTRUCTIONS
Follow-up with Dr. Juan in 2-3 months for an in-person visit for diabetes, blood pressure, and cholesterol.

## 2020-06-18 NOTE — TELEPHONE ENCOUNTER
Patient has INR scheduled for 06/29/20.   When I spoke to patient on 06/08/20, I informed him that the national standard for INR monitoring is no longer than 12 weeks.  Patient verbalized understanding; however, he was under the impression that he did NOT need to come in due to Covid-19. I informed patient that our clinics have all of the precautions in place for anyone coming in to our clinics.    Lyly Florian RN

## 2020-06-29 ENCOUNTER — DOCUMENTATION ONLY (OUTPATIENT)
Dept: NURSING | Facility: CLINIC | Age: 55
End: 2020-06-29

## 2020-06-29 ENCOUNTER — ANTICOAGULATION THERAPY VISIT (OUTPATIENT)
Dept: NURSING | Facility: CLINIC | Age: 55
End: 2020-06-29
Payer: COMMERCIAL

## 2020-06-29 DIAGNOSIS — I26.99 OTHER ACUTE PULMONARY EMBOLISM WITHOUT ACUTE COR PULMONALE (H): ICD-10-CM

## 2020-06-29 DIAGNOSIS — Z86.718 PERSONAL HISTORY OF DVT (DEEP VEIN THROMBOSIS): ICD-10-CM

## 2020-06-29 DIAGNOSIS — Z79.01 LONG TERM CURRENT USE OF ANTICOAGULANT THERAPY: ICD-10-CM

## 2020-06-29 DIAGNOSIS — Z86.711 HISTORY OF PULMONARY EMBOLISM: ICD-10-CM

## 2020-06-29 DIAGNOSIS — Z79.01 LONG TERM CURRENT USE OF ANTICOAGULANTS WITH INR GOAL OF 2.0-3.0: ICD-10-CM

## 2020-06-29 LAB
CAPILLARY BLOOD COLLECTION: NORMAL
INR PPP: 2.4 (ref 0.86–1.14)

## 2020-06-29 PROCEDURE — 36416 COLLJ CAPILLARY BLOOD SPEC: CPT | Performed by: FAMILY MEDICINE

## 2020-06-29 PROCEDURE — 99207 ZZC NO CHARGE NURSE ONLY: CPT

## 2020-06-29 PROCEDURE — 85610 PROTHROMBIN TIME: CPT | Performed by: FAMILY MEDICINE

## 2020-06-29 NOTE — PROGRESS NOTES
ANTICOAGULATION FOLLOW-UP CLINIC VISIT    Patient Name:  Schuyler Castro  Date:  2020  Contact Type:  Telephone    SUBJECTIVE:  Patient Findings     Comments:   The patient was assessed for diet, medication, and activity level changes, missed or extra doses, bruising or bleeding, with no problem findings.  Patient had gotten approval for 12 week INR monitoring by PharmD in 2020, note sent to PharmD to get the ok to continue 12 week INR monitoring as long as INR is in range.          Clinical Outcomes     Negatives:   Major bleeding event, Thromboembolic event, Anticoagulation-related hospital admission, Anticoagulation-related ED visit, Anticoagulation-related fatality    Comments:   The patient was assessed for diet, medication, and activity level changes, missed or extra doses, bruising or bleeding, with no problem findings.  Patient had gotten approval for 12 week INR monitoring by PharmD in 2020, note sent to PharmD to get the ok to continue 12 week INR monitoring as long as INR is in range.             OBJECTIVE    Recent labs: (last 7 days)     20  0813   INR 2.40*       ASSESSMENT / PLAN  INR assessment THER    Recheck INR In: 12 WEEKS    INR Location Clinic      Anticoagulation Summary  As of 2020    INR goal:   2.0-3.0   TTR:   100.0 % (8.7 mo)   INR used for dosin.40 (2020)   Warfarin maintenance plan:   7.5 mg (5 mg x 1.5) every Tue; 5 mg (5 mg x 1) all other days   Full warfarin instructions:   7.5 mg every Tue; 5 mg all other days   Weekly warfarin total:   37.5 mg   No change documented:   Lyly Florian RN   Plan last modified:   Lyly Florian RN (2019)   Next INR check:   2020   Priority:   Maintenance   Target end date:   Indefinite    Indications    Long-term (current) use of anticoagulants [Z79.01] [Z79.01]  Other acute pulmonary embolism without acute cor pulmonale (H) [I26.99]  Personal history of DVT (deep vein thrombosis) [Z86.718]  History of  pulmonary embolism [Z86.711]  Long term current use of anticoagulants with INR goal of 2.0-3.0 [Z79.01]             Anticoagulation Episode Summary     INR check location:       Preferred lab:       Send INR reminders to:   [facility]    Comments:         Anticoagulation Care Providers     Provider Role Specialty Phone number    Rasheeda Juan MD Referring [facility] [phone]            See the Encounter Report to view Anticoagulation Flowsheet and Dosing Calendar (Go to Encounters tab in chart review, and find the Anticoagulation Therapy Visit)        Lyly Florian RN

## 2020-06-29 NOTE — PROGRESS NOTES
Patient would like to continue 12 week INR monitoring, was given the OK by OMAR Becerra PharmD at INR visit on 03/16/20.    OK to continue with 12 week INR monitoring?    Patient came in for INR today, 06/29/20, it was therapeutic at 2.4, TTR of 88%.    Thank you,  Lyly Florian RN

## 2020-06-30 NOTE — PROGRESS NOTES
Anticoagulation    Schuyler Castro 54 year old male    Chart reviewed for evaluation of next INR follow up date to limit exposure to health care setting during COVID-19 concern.    Lab Results   Component Value Date    INR 2.40 06/29/2020    INR 2.2 03/16/2020    INR 2.2 01/24/2020    INR 2.0 12/23/2019    INR 2.4 10/28/2019    INR 2.4 09/16/2019    INR 2.4 07/30/2019    INR 2.1 07/02/2019    INR 1.9 06/18/2019    INR 2.5 05/09/2019    INR 1.9 04/23/2019    INR 2.1 04/08/2019    INR 1.9 03/29/2019    INR 1.5 01/03/2015    INR 2.6 01/02/2015    INR 2.10 08/13/2010    INR 1.72 08/12/2010    INR 1.28 08/11/2010    INR 1.09 08/10/2010    INR 1.06 08/09/2010       Assessment/plan:    Last out of range INR 6/18/2019.  Stable on 37.5 mg/week       Chest guidelines suggest for patients with consistently stable INRs an INR testing frequency of up to 12 weeks    May extended next INR 8-12 weeks again, but no farther (overdue for last authorized 12 week check)    Required patient education:      Importance of notifying clinic for diarrhea, nausea/vomiting, reduced intake, illness, medication changes and/or s/sx of bleeding/clotting.    Jess Gordon, [facility]  Anticoagulation clinic

## 2020-07-02 NOTE — PROGRESS NOTES
Next INR scheduled out 12 weeks.  Patient had been informed at INR visit on 06/29/20 that INR must be checked no longer than 12 weeks and I also stressed the importance to inform INR clinic of any health or medication changes or concerns.    Lyly Florian RN

## 2020-07-09 ENCOUNTER — TELEPHONE (OUTPATIENT)
Dept: FAMILY MEDICINE | Facility: CLINIC | Age: 55
End: 2020-07-09

## 2020-07-09 NOTE — TELEPHONE ENCOUNTER
Pt called back and stated he will schedule an eye exam. He states he is aware of what he is due for.  Belkys Ramirez MA on 7/9/2020 at 6:55 PM

## 2020-07-09 NOTE — TELEPHONE ENCOUNTER
Panel Management Review      Patient has the following on his problem list:     Diabetes    ASA: Passed    Last A1C  Lab Results   Component Value Date    A1C 9.5 04/24/2019    A1C 8.9 05/24/2018    A1C 6.8 01/23/2017    A1C 6.3 11/03/2015    A1C 6.4 10/24/2014     A1C tested: FAILED    Last LDL:    Lab Results   Component Value Date    CHOL 150 04/24/2019     Lab Results   Component Value Date    HDL 40 04/24/2019     Lab Results   Component Value Date    LDL 80 04/24/2019     Lab Results   Component Value Date    TRIG 148 04/24/2019     Lab Results   Component Value Date    CHOLHDLRATIO 3.7 11/03/2015     Lab Results   Component Value Date    NHDL 110 04/24/2019       Is the patient on a Statin? YES             Is the patient on Aspirin? YES    Medications     HMG CoA Reductase Inhibitors     simvastatin (ZOCOR) 20 MG tablet       Salicylates     aspirin 81 MG tablet             Last three blood pressure readings:  BP Readings from Last 3 Encounters:   04/24/19 132/82   05/24/18 128/76   01/23/17 136/80       Date of last diabetes office visit: 6/17/2020     Tobacco History:     History   Smoking Status     Never Smoker   Smokeless Tobacco     Never Used           Composite cancer screening  Chart review shows that this patient is due/due soon for the following Colonoscopy  Summary:    Patient is due/failing the following:   Foot and eye exam, A1C, COLONOSCOPY and MAMMOGRAM    Action needed:   Patient needs referral/order: colonoscopy and eye exam    Type of outreach:    Phone, left message for patient to call back.     Questions for provider review:    None                                                                                                                                    Daly Moreno CMA       Chart routed to Care Team .

## 2020-10-05 ENCOUNTER — TELEPHONE (OUTPATIENT)
Dept: NURSING | Facility: CLINIC | Age: 55
End: 2020-10-05

## 2020-10-05 NOTE — TELEPHONE ENCOUNTER
Schuyler Castro is overdue for INR check.      Left message for patient to call and schedule INR check as soon as possible. If returning call, please schedule.    Patient ok'd for 12 week INR checks, last INR was 06/29/20 so was due 09/21/20.    Lyly Florian RN

## 2020-10-09 DIAGNOSIS — E11.9 TYPE 2 DIABETES MELLITUS WITHOUT COMPLICATION, WITHOUT LONG-TERM CURRENT USE OF INSULIN (H): ICD-10-CM

## 2020-10-09 NOTE — TELEPHONE ENCOUNTER
Stated at last refill would call to schedule.  No upcoming appt.  Please call and schedule in person visit.  Needs to be routed to Dr. Juan after he schedules as he already had dana refill and did not follow-up.  Nancy Medina RN     Return in about 2 months (around 8/17/2020) for Routine Visit, Medication check- diabetes, HTN, cholesterol (IN PERSON).  Follow-up with Dr. Juan in 2-3 months for an in-person visit for diabetes, blood pressure, and cholesterol.            September 16, 2020  Stephanie Le     2:13 PM  Note     09/16- pt called back stating he will call within the next week to schedule office visit.      Stephanie MOSS Patient Rep        Routing refill request to provider for review/approval because:  Dana given x1 and patient did not follow up, please advise  Labs not current:  A1C and Creatinine    Nancy Medina RN

## 2020-10-12 NOTE — TELEPHONE ENCOUNTER
Called pt-informed of message, pt stated he will call back to schedule, informed him it would not be refilled until he scheduled.    Silvana Colunga/JIMMIE

## 2020-10-13 DIAGNOSIS — E11.9 TYPE 2 DIABETES MELLITUS WITHOUT COMPLICATION, WITHOUT LONG-TERM CURRENT USE OF INSULIN (H): ICD-10-CM

## 2020-10-13 RX ORDER — GLIPIZIDE 10 MG/1
TABLET, FILM COATED, EXTENDED RELEASE ORAL
Qty: 60 TABLET | Refills: 0 | Status: SHIPPED | OUTPATIENT
Start: 2020-10-13 | End: 2020-11-05

## 2020-10-14 NOTE — TELEPHONE ENCOUNTER
Please call David, he is free to schedule with whoever he wants, but he wishes to follow up with me, then I prefer to see him in person, so please schedule him a visit within 1 month.  I will refill the medicine until he can get in.

## 2020-10-14 NOTE — TELEPHONE ENCOUNTER
In person visit was advised around 8/17/20 per virtual visit with Fabio 6/17/20.  A1C and Creatinine needed.  Declined lab appt when offered by Fabio.  Is scheduled for video visit again 10/19/20 with Dr. Oliva.  Please advise.  Nancy Medina, RN    6/17/2020  [facility]  2. Type 2 diabetes mellitus without complication, without long-term current use of insulin (H)     Refilled medications until appointment with primary care provider. Ordered new testing supplies. Patient declined coming in for lab only prior to seeing primary care provider. His appointment should be face-to-face.     Fabio Mensah PA-C               Other Notes     All notes  Instructions       Return in about 2 months (around 8/17/2020) for Routine Visit, Medication check- diabetes, HTN, cholesterol (IN PERSON).  Follow-up with Dr. Juan in 2-3 months for an in-person visit for diabetes, blood pressure, and cholesterol.

## 2020-11-05 DIAGNOSIS — E11.9 TYPE 2 DIABETES MELLITUS WITHOUT COMPLICATION, WITHOUT LONG-TERM CURRENT USE OF INSULIN (H): ICD-10-CM

## 2020-11-05 RX ORDER — GLIPIZIDE 10 MG/1
TABLET, FILM COATED, EXTENDED RELEASE ORAL
Qty: 60 TABLET | Refills: 0 | Status: SHIPPED | OUTPATIENT
Start: 2020-11-05 | End: 2020-12-02

## 2020-11-05 NOTE — TELEPHONE ENCOUNTER
#60 requested  Pt has appointment scheduled  Prescription approved per [facility] Refill Protocol.  Sue Rubio RN, BSN  Message handled by CLINIC NURSE.

## 2020-11-22 ENCOUNTER — HEALTH MAINTENANCE LETTER (OUTPATIENT)
Age: 55
End: 2020-11-22

## 2020-11-24 ENCOUNTER — VIRTUAL VISIT (OUTPATIENT)
Dept: FAMILY MEDICINE | Facility: OTHER | Age: 55
End: 2020-11-24
Payer: COMMERCIAL

## 2020-11-24 DIAGNOSIS — Z20.822 SUSPECTED COVID-19 VIRUS INFECTION: Primary | ICD-10-CM

## 2020-11-24 PROCEDURE — 99421 OL DIG E/M SVC 5-10 MIN: CPT | Performed by: PHYSICIAN ASSISTANT

## 2020-11-24 NOTE — PROGRESS NOTES
"Date: 2020 15:14:04  Clinician: Carmen Monet  Clinician NPI: [id number]  Patient: Schuyler Castro  Patient : 1965  Patient Address: [address] [place], [place] [id number]  Patient Phone: [phone]  Visit Protocol: URI  Patient Summary:  Schuyler is a 55 year old ( : 1965 ) male who initiated a [facility] Visit for COVID-19 (Coronavirus) evaluation and screening. When asked the question \"Please sign me up to receive news, health information and promotions from [facility].\", Schuyler responded \"Yes\".    Schuyler states his symptoms started gradually 3-4 days ago. After his symptoms started, they improved and then got worse again.   His symptoms consist of vomiting, rhinitis, chills, malaise, diarrhea, a headache, a cough, nasal congestion, and nausea. He is experiencing difficulty breathing due to nasal congestion but he is not short of breath.   Symptom details     Nasal secretions: The color of his mucus is green.    Cough: Schuyler coughs a few times an hour and his cough is more bothersome at night. Phlegm comes into his throat when he coughs. He believes his cough is caused by post-nasal drip. The color of the phlegm is green.     Headache: He states the headache is mild (1-3 on a 10 point pain scale).      Schuyler denies having facial pain or pressure, myalgias, sore throat, teeth pain, ageusia, ear pain, wheezing, fever, and anosmia. He also denies taking antibiotic medication in the past month, having recent facial or sinus surgery in the past 60 days, and having a sinus infection within the past year.   Precipitating events  He has not recently been exposed to someone with influenza. Schuyler has been in close contact with the following high risk individuals: people with asthma, heart disease or diabetes.   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 In the past 14 days, Schuyler has " not worked or volunteered at a healthcare facility or group living setting.   In the past 14 days, he also has not lived in a congregate living setting.   Schuyler has had a close contact with a laboratory-confirmed COVID-19 patient within 14 days of symptom onset. He was exposed at his work. Date Schuyler was exposed to the laboratory-confirmed COVID-19 patient: 11/07/2020   Additional information about contact with COVID-19 (Coronavirus) patient as reported by the patient (free text): Gave a short ride in [employer] car    Since December 2019, Schuyler has not been tested for COVID-19 and has had upper respiratory infection (URI) or influenza-like illness.      Date(s) of previous URI or influenza-like illness (free-text): 11/072020     Symptoms Schuyler experienced during previous URI or influenza-like illness as reported by the patient (free-text): Cough        Pertinent medical history  Schuyler needs a return to work/school note.   Weight: 280 lbs   Schuyler does not smoke or use smokeless tobacco.   Weight: 280 lbs    MEDICATIONS: atenolol oral, glipizide-metformin oral, warfarin oral, lisinopril-hydrochlorothiazide oral, simvastatin oral, metformin oral, ALLERGIES: NKDA  Clinician Response:  Dear Schuyler,   Your symptoms show that you may have coronavirus (COVID-19). This illness can cause fever, cough and trouble breathing. Many people get a mild case and get better on their own. Some people can get very sick.  What should I do?  We would like to test you for this virus.   1. Please call [phone] to schedule your visit. Explain that you were referred by [facility] to have a COVID-19 test. Be ready to share your [facility] visit ID number.  * If you need to schedule in [facility] please call [phone] or for [employer] employees please call [phone].  * If you need to schedule in the [place] area please call [phone]. [employer] employees call [phone].  The following will serve as your written order for this  "COVID Test, ordered by me, for the indication of suspected COVID [Z20.828]: The test will be ordered in [employer], our electronic health record, after you are scheduled. It will show as ordered and authorized by Michael Cronin MD.  Order: COVID-19 (Coronavirus) PCR for SYMPTOMATIC [facility].   2. When it's time for your COVID test:  Stay at least 6 feet away from others. (If someone will drive you to your test, stay in the backseat, as far away from the  as you can.)   Cover your mouth and nose with a mask, tissue or washcloth.  Go straight to the testing site. Don't make any stops on the way there or back.      3.Starting now: Stay home and away from others (self-isolate) until:   You've had no fever---and no medicine that reduces fever---for one full day (24 hours). And...   Your other symptoms have gotten better. For example, your cough or breathing has improved. And...   At least 10 days have passed since your symptoms started.       During this time, don't leave the house except for testing or medical care.   Stay in your own room, even for meals. Use your own bathroom if you can.   Stay away from others in your home. No hugging, kissing or shaking hands. No visitors.  Don't go to work, school or anywhere else.    Clean \"high touch\" surfaces often (doorknobs, counters, handles, etc.). Use a household cleaning spray or wipes. You'll find a full list of  on the EPA website: www.epa.gov/pesticide-registration/list-n-disinfectants-use-against-sars-cov-2.   Cover your mouth and nose with a mask, tissue or washcloth to avoid spreading germs.  Wash your hands and face often. Use soap and water.  Caregivers in these groups are at risk for severe illness due to COVID-19:  o People 65 years and older  o People who live in a nursing home or long-term care facility  o People with chronic disease (lung, heart, cancer, diabetes, kidney, liver, immunologic)  o People who have a weakened immune system, " including those who:   Are in cancer treatment  Take medicine that weakens the immune system, such as corticosteroids  Had a bone marrow or organ transplant  Have an immune deficiency  Have poorly controlled HIV or AIDS  Are obese (body mass index of 40 or higher)  Smoke regularly   o Caregivers should wear gloves while washing dishes, handling laundry and cleaning bedrooms and bathrooms.  o Use caution when washing and drying laundry: Don't shake dirty laundry, and use the warmest water setting that you can.  o For more tips, go to www.cdc.gov/coronavirus/2019-ncov/downloads/10Things.pdf.    4.Sign up for [employer] We know it's scary to hear that you might have COVID-19. We want to track your symptoms to make sure you're okay over the next 2 weeks. Please look for an email from [employer]---this is a free, online program that we'll use to keep in touch. To sign up, follow the link in the email. Learn more at [email or web address]  How can I take care of myself?   Get lots of rest. Drink extra fluids (unless a doctor has told you not to).   Take Tylenol (acetaminophen) for fever or pain. If you have liver or kidney problems, ask your family doctor if it's okay to take Tylenol.   Adults can take either:    650 mg (two 325 mg pills) every 4 to 6 hours, or...   1,000 mg (two 500 mg pills) every 8 hours as needed.    Note: Don't take more than 3,000 mg in one day. Acetaminophen is found in many medicines (both prescribed and over-the-counter medicines). Read all labels to be sure you don't take too much.   For children, check the Tylenol bottle for the right dose. The dose is based on the child's age or weight.    If you have other health problems (like cancer, heart failure, an organ transplant or severe kidney disease): Call your specialty clinic if you don't feel better in the next 2 days.       Know when to call 911. Emergency warning signs include:    Trouble breathing or shortness of breath Pain  or pressure in the chest that doesn't go away Feeling confused like you haven't felt before, or not being able to wake up Bluish-colored lips or face.  Where can I get more information?   [facility] -- About COVID-19: [email or web address]   CDC -- What to Do If You're Sick: www.cdc.gov/coronavirus/2019-ncov/about/steps-when-sick.html   CDC -- Ending Home Isolation: www.cdc.gov/coronavirus/2019-ncov/hcp/disposition-in-home-patients.html   CDC -- Caring for Someone: www.cdc.gov/coronavirus/2019-ncov/if-you-are-sick/care-for-someone.html   [facility] -- Interim Guidance for Hospital Discharge to Home: www.health.[facility].mn./diseases/coronavirus/hcp/hospdischarge.pdf   [facility] clinical trials (COVID-19 research studies): clinicalaffairs.[facility]-clinical-trials    Below are the COVID-19 hotlines at the [facility] ([facility]). Interpreters are available.    For health questions: Call 130-312-2669 or 1-981.604.4468 (7 a.m. to 7 p.m.) For questions about schools and childcare: Call 678-615-7651 or 1-300.456.6041 (7 a.m. to 7 p.m.)    Diagnosis: Contact with and (suspected) exposure to other viral communicable diseases  Diagnosis ICD: Z20.828

## 2020-11-25 DIAGNOSIS — Z20.822 SUSPECTED COVID-19 VIRUS INFECTION: ICD-10-CM

## 2020-11-25 PROCEDURE — U0003 INFECTIOUS AGENT DETECTION BY NUCLEIC ACID (DNA OR RNA); SEVERE ACUTE RESPIRATORY SYNDROME CORONAVIRUS 2 (SARS-COV-2) (CORONAVIRUS DISEASE [COVID-19]), AMPLIFIED PROBE TECHNIQUE, MAKING USE OF HIGH THROUGHPUT TECHNOLOGIES AS DESCRIBED BY CMS-2020-01-R: HCPCS | Performed by: PHYSICIAN ASSISTANT

## 2020-11-26 LAB
SARS-COV-2 RNA SPEC QL NAA+PROBE: ABNORMAL
SPECIMEN SOURCE: ABNORMAL

## 2020-11-27 ENCOUNTER — TELEPHONE (OUTPATIENT)
Dept: LAB | Facility: CLINIC | Age: 55
End: 2020-11-27

## 2020-11-27 NOTE — TELEPHONE ENCOUNTER
"Coronavirus (COVID-19) Notification    Caller Name (Patient, parent, daughter/son, grandparent, etc)  David    Reason for call  Notify of Positive Coronavirus (COVID-19) lab results, assess symptoms,  review [facility] recommendations    Lab Result    Lab test:  2019-nCoV rRt-PCR or SARS-CoV-2 PCR    Oropharyngeal AND/OR nasopharyngeal swabs is POSITIVE for 2019-nCoV RNA/SARS-COV-2 PCR (COVID-19 virus)    RN Recommendations/Instructions per [facility] Coronavirus COVID-19 recommendations    Brief introduction script  Introduce self then review script:  \"I am calling on behalf of [employer].  We were notified that your Coronavirus test (COVID-19) for was POSITIVE for the virus.  I have some information to relay to you but first I wanted to mention that the [place] Dept of Health will be contacting you shortly [it's possible MD already called Patient] to talk to you more about how you are feeling and other people you have had contact with who might now also have the virus.  Also, [facility] is Partnering with the [facility] for Covid-19 research, you may be contacted directly by research staff.\"    Assessment (Inquire about Patient's current symptoms)   Assessment   Current Symptoms at time of phone call: (if no symptoms, document No symptoms] Fatigue.  Feeling improved.  No fever.  Diarrhea has resolved.   Symptoms onset (if applicable) 11/18/20     If at time of call, Patients symptoms hare worsened, the Patient should contact 911 or have someone drive them to Emergency Dept promptly:      If Patient calling 911, inform 911 personal that you have tested positive for the Coronavirus (COVID-19).  Place mask on and await 911 to arrive.    If Emergency Dept, If possible, please have another adult drive you to the Emergency Dept but you need to wear mask when in contact with other people.      Review information with Patient    Your result was positive. This means you have COVID-19 " (coronavirus).  We have sent you a letter that reviews the information that I'll be reviewing with you now.    How can I protect others?    If you have symptoms: stay home and away from others (self-isolate) until:    You've had no fever--and no medicine that reduces fever--for 1 full day (24 hours). And       Your other symptoms have gotten better. For example, your cough or breathing has improved. And     At least 10 days have passed since your symptoms started. (If you've been told by a doctor that you have a weak immune system, wait 20 days.)     If you don't have symptoms: Stay home and away from others (self-isolate) until at least 10 days have passed since your first positive COVID-19 test. (Date test collected)    During this time:    Stay in your own room, including for meals. Use your own bathroom if you can.    Stay away from others in your home. No hugging, kissing or shaking hands. No visitors.     Don't go to work, school or anywhere else.     Clean  high touch  surfaces often (doorknobs, counters, handles, etc.). Use a household cleaning spray or wipes. You'll find a full list on the EPA website at www.epa.gov/pesticide-registration/list-n-disinfectants-use-against-sars-cov-2.     Cover your mouth and nose with a mask, tissue or other face covering to avoid spreading germs.    Wash your hands and face often with soap and water.    Caregivers in these groups are at risk for severe illness due to COVID-19:  o People 65 years and older  o People who live in a nursing home or long-term care facility  o People with chronic disease (lung, heart, cancer, diabetes, kidney, liver, immunologic)  o People who have a weakened immune system, including those who:  - Are in cancer treatment  - Take medicine that weakens the immune system, such as corticosteroids  - Had a bone marrow or organ transplant  - Have an immune deficiency  - Have poorly controlled HIV or AIDS  - Are obese (body mass index of 40 or  higher)  - Smoke regularly    Caregivers should wear gloves while washing dishes, handling laundry and cleaning bedrooms and bathrooms.    Wash and dry laundry with special caution. Don't shake dirty laundry, and use the warmest water setting you can.    If you have a weakened immune system, ask your doctor about other actions you should take.    For more tips, go to www.cdc.gov/coronavirus/2019-ncov/downloads/10Things.pdf.    You should not go back to work until you meet the guidelines above for ending your home isolation. You don't need to be retested for COVID-19 before going back to work--studies show that you won't spread the virus if it's been at least 10 days since your symptoms started (or 20 days, if you have a weak immune system).    Employers: This document serves as formal notice of your employee's medical guidelines for going back to work. They must meet the above guidelines before going back to work in person.    How can I take care of myself?    1. Get lots of rest. Drink extra fluids (unless a doctor has told you not to).    2. Take Tylenol (acetaminophen) for fever or pain. If you have liver or kidney problems, ask your family doctor if it's okay to take Tylenol.     Take either:     650 mg (two 325 mg pills) every 4 to 6 hours, or     1,000 mg (two 500 mg pills) every 8 hours as needed.     Note: Don't take more than 3,000 mg in one day. Acetaminophen is found in many medicines (both prescribed and over-the-counter medicines). Read all labels to be sure you don't take too much.    For children, check the Tylenol bottle for the right dose (based on their age or weight).    3. If you have other health problems (like cancer, heart failure, an organ transplant or severe kidney disease): Call your specialty clinic if you don't feel better in the next 2 days.    4. Know when to call 911: Emergency warning signs include:    Trouble breathing or shortness of breath    Pain or pressure in the chest that  doesn't go away    Feeling confused like you haven't felt before, or not being able to wake up    Bluish-colored lips or face    5. Sign up for [employer]. We know it's scary to hear that you have COVID-19. We want to track your symptoms to make sure you're okay over the next 2 weeks. Please look for an email from [employer]--this is a free, online program that we'll use to keep in touch. To sign up, follow the link in the email. Learn more at [email or web address].    Where can I get more information?    [facility]: [email or web address]    Coronavirus Basics: www.health.[facility].mn.us/diseases/coronavirus/basics.html    What to Do If You're Sick: www.cdc.gov/coronavirus/2019-ncov/about/steps-when-sick.html    Ending Home Isolation: www.cdc.gov/coronavirus/2019-ncov/hcp/disposition-in-home-patients.html     Caring for Someone with COVID-19: www.cdc.gov/coronavirus/2019-ncov/if-you-are-sick/care-for-someone.html     [facility] clinical trials (COVID-19 research studies): clinicalaffairs.[facility]/n-clinical-trials     A Positive COVID-19 letter will be sent via [employer] or the mail. (Exception, no letters sent to Presurgerical/Preprocedure Patients)    Patrica Gonzalez, MSN, RN

## 2020-11-30 DIAGNOSIS — E11.9 TYPE 2 DIABETES MELLITUS WITHOUT COMPLICATION, WITHOUT LONG-TERM CURRENT USE OF INSULIN (H): ICD-10-CM

## 2020-12-02 RX ORDER — GLIPIZIDE 10 MG/1
TABLET, FILM COATED, EXTENDED RELEASE ORAL
Qty: 60 TABLET | Refills: 0 | Status: SHIPPED | OUTPATIENT
Start: 2020-12-02 | End: 2021-01-04

## 2020-12-02 NOTE — TELEPHONE ENCOUNTER
Refill extended, pt has appointment scheduled  Prescription approved per [facility] Refill Protocol.  Sue Rubio RN, BSN  Message handled by CLINIC NURSE.

## 2020-12-02 NOTE — TELEPHONE ENCOUNTER
Has appointment scheduled, refill extended  Prescription approved per [facility] Refill Protocol.  Sue Rubio RN, BSN  Message handled by CLINIC NURSE.

## 2020-12-12 ENCOUNTER — PATIENT OUTREACH (OUTPATIENT)
Dept: FAMILY MEDICINE | Facility: CLINIC | Age: 55
End: 2020-12-12

## 2020-12-12 ENCOUNTER — OFFICE VISIT (OUTPATIENT)
Dept: URGENT CARE | Facility: URGENT CARE | Age: 55
End: 2020-12-12
Payer: COMMERCIAL

## 2020-12-12 ENCOUNTER — ANCILLARY PROCEDURE (OUTPATIENT)
Dept: GENERAL RADIOLOGY | Facility: CLINIC | Age: 55
End: 2020-12-12
Attending: PHYSICIAN ASSISTANT
Payer: COMMERCIAL

## 2020-12-12 VITALS
WEIGHT: 284 LBS | RESPIRATION RATE: 16 BRPM | DIASTOLIC BLOOD PRESSURE: 84 MMHG | BODY MASS INDEX: 39.06 KG/M2 | OXYGEN SATURATION: 95 % | HEART RATE: 102 BPM | TEMPERATURE: 98.2 F | SYSTOLIC BLOOD PRESSURE: 156 MMHG

## 2020-12-12 DIAGNOSIS — R31.9 HEMATURIA, UNSPECIFIED TYPE: Primary | ICD-10-CM

## 2020-12-12 DIAGNOSIS — R10.9 RIGHT FLANK PAIN: ICD-10-CM

## 2020-12-12 DIAGNOSIS — R31.9 HEMATURIA, UNSPECIFIED TYPE: ICD-10-CM

## 2020-12-12 LAB
ALBUMIN UR-MCNC: 30 MG/DL
APPEARANCE UR: CLEAR
BACTERIA #/AREA URNS HPF: ABNORMAL /HPF
BILIRUB UR QL STRIP: NEGATIVE
COLOR UR AUTO: YELLOW
GLUCOSE UR STRIP-MCNC: NEGATIVE MG/DL
HGB UR QL STRIP: ABNORMAL
INR PPP: 4.4 (ref 0.86–1.14)
KETONES UR STRIP-MCNC: NEGATIVE MG/DL
LEUKOCYTE ESTERASE UR QL STRIP: NEGATIVE
NITRATE UR QL: NEGATIVE
PH UR STRIP: 5 PH (ref 5–7)
RBC #/AREA URNS AUTO: ABNORMAL /HPF
SOURCE: ABNORMAL
SP GR UR STRIP: <=1.005 (ref 1–1.03)
UROBILINOGEN UR STRIP-ACNC: 0.2 EU/DL (ref 0.2–1)
WBC #/AREA URNS AUTO: ABNORMAL /HPF

## 2020-12-12 PROCEDURE — 99214 OFFICE O/P EST MOD 30 MIN: CPT | Performed by: PHYSICIAN ASSISTANT

## 2020-12-12 PROCEDURE — 85610 PROTHROMBIN TIME: CPT | Performed by: PHYSICIAN ASSISTANT

## 2020-12-12 PROCEDURE — 74019 RADEX ABDOMEN 2 VIEWS: CPT | Performed by: FAMILY MEDICINE

## 2020-12-12 PROCEDURE — 36416 COLLJ CAPILLARY BLOOD SPEC: CPT | Performed by: PHYSICIAN ASSISTANT

## 2020-12-12 PROCEDURE — 81001 URINALYSIS AUTO W/SCOPE: CPT | Performed by: PHYSICIAN ASSISTANT

## 2020-12-12 ASSESSMENT — ENCOUNTER SYMPTOMS
CHILLS: 0
VOMITING: 0
DIARRHEA: 0
FLANK PAIN: 1
ABDOMINAL PAIN: 0
HEMATURIA: 1
FEVER: 0
DYSURIA: 0
NAUSEA: 0

## 2020-12-12 NOTE — PROGRESS NOTES
SUBJECTIVE:   Schuyler Castro is a 55 year old male presenting with a chief complaint of   Chief Complaint   Patient presents with     Urinary Problem     Blood in urine noticed today- right side low back soreness       He is an established patient of [facility]. Patient with history of Type 2 diabetes, HTN, Hyperlipidemia, on warfarin presenting to urgent care today with complaint of blood in the urine.    Hematuria    Onset of symptoms was this morning. Urine started looking clear while he was at work, but he noted the blood in the urine starting again a couple hours ago, reason which prompted this visit.   Course of illness is waxing and waning  Severity mild  Current and associated symptoms back pain, blood in the urine  Treatment and measures tried Increase fluid intake  Predisposing factors include diabetes, patient on warfarin. Has not had his INR checked in the past few months.  Patient denies rigors, temperature > 101 degrees F. Denies abdominal pain, and vomiting. No history of Kidney stones.  Of note he tested positive for Covid 11/25/2020.  No chest pain. No SOB. No cough.      Review of Systems   Constitutional: Negative for chills and fever.   Gastrointestinal: Negative for abdominal pain, diarrhea, nausea and vomiting.   Genitourinary: Positive for flank pain (mild, right, intermittent) and hematuria. Negative for discharge, dysuria, penile pain, penile swelling and testicular pain.       Past Medical History:   Diagnosis Date     Diabetes mellitus type 2, noninsulin dependent (H) 2002     DVT of deep femoral vein (H) 8/2010     Hypertension 1996     Pulmonary embolus (H) 8/2010     Family History   Problem Relation Age of Onset     Neurologic Disorder Mother         mysthenia gravis     Dementia Father      Current Outpatient Medications   Medication Sig Dispense Refill     Acetaminophen (TYLENOL PO) Take 650 mg by mouth       aspirin 81 MG tablet Take 1 tablet (81 mg) by mouth daily 30 tablet 12      atenolol (TENORMIN) 50 MG tablet Take 1 tablet (50 mg) by mouth daily 90 tablet 0     blood glucose (NO BRAND SPECIFIED) lancets standard Use to test blood sugar 2-3 times daily or as directed. 90 each 11     blood glucose (NO BRAND SPECIFIED) test strip Use to test blood sugar 2-3 times daily or as directed. 100 each 11     blood glucose monitoring (NO BRAND SPECIFIED) meter device kit Use to test blood sugar 2-3 times daily or as directed. 1 kit 0     glipiZIDE (GLUCOTROL XL) 10 MG 24 hr tablet TAKE 2 TABLETS BY MOUTH EVERY DAY 60 tablet 0     lisinopril-hydrochlorothiazide (ZESTORETIC) 20-12.5 MG tablet TAKE 2 TABLETS BY MOUTH DAILY 180 tablet 0     metFORMIN (GLUCOPHAGE) 500 MG tablet TAKE 2 TABLETS BY MOUTH TWICE A DAY WITH MEALS 120 tablet 0     simvastatin (ZOCOR) 20 MG tablet Take 1 tablet (20 mg) by mouth At Bedtime 90 tablet 0     warfarin ANTICOAGULANT (COUMADIN) 5 MG tablet TAKE 1 & 1/2 TAB (7.5MG) EVERY TUES/ TAKE 1 TAB (5MG) ALL OTHER DAYS OR AS INSTRUCTED BY INR CLINIC 94 tablet 1     Social History     Tobacco Use     Smoking status: Never Smoker     Smokeless tobacco: Never Used   Substance Use Topics     Alcohol use: Not Currently     Alcohol/week: 0.0 - 1.0 standard drinks       OBJECTIVE  BP (!) 156/84   Pulse 102   Temp 98.2  F (36.8  C) (Oral)   Resp 16   Wt 128.8 kg (284 lb)   SpO2 95%   BMI 39.06 kg/m      Physical Exam  Vitals signs and nursing note reviewed.   Constitutional:       General: He is not in acute distress.     Appearance: He is well-developed.   HENT:      Head: Normocephalic and atraumatic.      Right Ear: External ear normal.      Left Ear: External ear normal.   Eyes:      Conjunctiva/sclera: Conjunctivae normal.   Neck:      Musculoskeletal: Normal range of motion.   Cardiovascular:      Rate and Rhythm: Regular rhythm.      Heart sounds: Normal heart sounds.   Pulmonary:      Effort: Pulmonary effort is normal. No respiratory distress.      Breath sounds: Normal  breath sounds.   Abdominal:      General: Bowel sounds are normal. There is no distension.      Palpations: Abdomen is soft.      Tenderness: There is no abdominal tenderness. There is no right CVA tenderness, left CVA tenderness, guarding or rebound.   Skin:     General: Skin is warm and dry.   Neurological:      Mental Status: He is alert.         Labs:  Results for orders placed or performed in visit on 12/12/20 (from the past 24 hour(s))   *UA reflex to Microscopic and Culture ([facility] (except [place] and [place])    Specimen: Midstream Urine   Result Value Ref Range    Color Urine Yellow     Appearance Urine Clear     Glucose Urine Negative NEG^Negative mg/dL    Bilirubin Urine Negative NEG^Negative    Ketones Urine Negative NEG^Negative mg/dL    Specific Gravity Urine <=1.005 1.003 - 1.035    Blood Urine Large (A) NEG^Negative    pH Urine 5.0 5.0 - 7.0 pH    Protein Albumin Urine 30 (A) NEG^Negative mg/dL    Urobilinogen Urine 0.2 0.2 - 1.0 EU/dL    Nitrite Urine Negative NEG^Negative    Leukocyte Esterase Urine Negative NEG^Negative    Source Midstream Urine    Urine Microscopic   Result Value Ref Range    WBC Urine 0 - 5 OTO5^0 - 5 /HPF    RBC Urine 5-10 (A) OTO2^O - 2 /HPF    Bacteria Urine Few (A) NEG^Negative /HPF   INR   Result Value Ref Range    INR 4.40 (H) 0.86 - 1.14       X-Ray was done, my findings are: no visible calcification noted.    ASSESSMENT:      ICD-10-CM    1. Hematuria, unspecified type  R31.9 *UA reflex to Microscopic and Culture ([place] and [facility] (except [place] and [place])     Urine Microscopic     INR     XR Abdomen 2 Views   2. Right flank pain  R10.9 XR Abdomen 2 Views            PLAN:    Hematuria, unspecified: Patient on Coumadin, has noted blood in his urine since this morning.  Urinalysis not suggestive of infection.  He otherwise does not have any other systemic symptoms.  Has not had his INR checked in a while.  INR checked here today is  at 4.4 supra therapeutic range.  We will have him hold his Coumadin today and tomorrow.  We will have him follow-up with INR clinic on Monday.  Discussed red flag symptoms and when to seek emergent care.  Patient agrees with the plan.  Right low back pain: Abdominal x-ray today is negative for visible calcification.  I do not suspect this to be a kidney stone.  Suspect musculoskeletal in nature.  Keep monitoring symptoms.  Follow-up if any worsening symptoms.  He agrees with the plan.    Followup:    If not improving or if condition worsens, follow up with your Primary Care Provider    Patient Instructions     Patient Education     Blood in Urine (Hematuria)   Blood in your urine is called hematuria. Most of the time, the cause is not serious. But you should never ignore blood in the urine. Your healthcare provider can evaluate you to find the cause of the bleeding and treat it, if needed.   Types of hematuria    Gross hematuria. This means that the blood can be seen by the naked eye. The urine may look pinkish, brownish, or bright red.    Microscopic hematuria. This means that the urine is clear, but blood cells can be seen when urine is looked at under a microscope or tested in a lab.  Both types of hematuria can have the same causes. Neither one is more serious than the other. With either type, you may not have any other symptoms at all. Or you may have other symptoms such as:     Pain, pressure, or burning when you urinate    Belly pain    Back pain  No matter how much blood is found, the cause of the bleeding needs to be identified.   What causes hematuria?  Causes of hematuria vary. They include things such as:     Injury    Strenuous exercise    Infection of the bladder, kidney, or prostate    Menstruation  Other reasons people may have blood in their urine are more serious. They include:       Blood-clotting disorders    Bladder or kidney cancer    Sickle cell disease    Inflammation of the kidney, urethra,  bladder, or prostate  Many treatments are available for blood in the urine, depending on the cause.   Diagnosing hematuria  Your healthcare provider will first confirm that blood is in your urine. He or she will also take your health history and give you a physical exam. Then other tests are done to find exactly where the blood is coming from and why. Your provider will decide which tests will best figure out the cause of your hematuria. These are some common tests that may be done:     Lab tests (may include urinalysis, a urine culture, a urine cytology, and blood tests)    Cystoscopy    CT or CT urography    MRI or MR urography    Ultrasound of the kidney    Kidney biopsy  Lili last reviewed this educational content on 6/1/2019 2000-2020 [employer]. [address] [place] [address], [place] [address]. All rights reserved. This information is not intended as a substitute for professional medical care. Always follow your healthcare professional's instructions.

## 2020-12-12 NOTE — NURSING NOTE
"Vital signs:  Temp: 98.2  F (36.8  C) Temp src: Oral BP: (!) 156/84 Pulse: 102   Resp: 16 SpO2: 95 %       Weight: 128.8 kg (284 lb)  Estimated body mass index is 39.06 kg/m  as calculated from the following:    Height as of 4/24/19: 1.816 m (5' 11.5\").    Weight as of this encounter: 128.8 kg (284 lb).        "

## 2020-12-12 NOTE — PATIENT INSTRUCTIONS
Patient Education     Blood in Urine (Hematuria)   Blood in your urine is called hematuria. Most of the time, the cause is not serious. But you should never ignore blood in the urine. Your healthcare provider can evaluate you to find the cause of the bleeding and treat it, if needed.   Types of hematuria    Gross hematuria. This means that the blood can be seen by the naked eye. The urine may look pinkish, brownish, or bright red.    Microscopic hematuria. This means that the urine is clear, but blood cells can be seen when urine is looked at under a microscope or tested in a lab.  Both types of hematuria can have the same causes. Neither one is more serious than the other. With either type, you may not have any other symptoms at all. Or you may have other symptoms such as:     Pain, pressure, or burning when you urinate    Belly pain    Back pain  No matter how much blood is found, the cause of the bleeding needs to be identified.   What causes hematuria?  Causes of hematuria vary. They include things such as:     Injury    Strenuous exercise    Infection of the bladder, kidney, or prostate    Menstruation  Other reasons people may have blood in their urine are more serious. They include:       Blood-clotting disorders    Bladder or kidney cancer    Sickle cell disease    Inflammation of the kidney, urethra, bladder, or prostate  Many treatments are available for blood in the urine, depending on the cause.   Diagnosing hematuria  Your healthcare provider will first confirm that blood is in your urine. He or she will also take your health history and give you a physical exam. Then other tests are done to find exactly where the blood is coming from and why. Your provider will decide which tests will best figure out the cause of your hematuria. These are some common tests that may be done:     Lab tests (may include urinalysis, a urine culture, a urine cytology, and blood tests)    Cystoscopy    CT or CT  urography    MRI or MR urography    Ultrasound of the kidney    Kidney biopsy  Lili last reviewed this educational content on 6/1/2019 2000-2020 [employer]. [address] [place] [address], [place] [address]. All rights reserved. This information is not intended as a substitute for professional medical care. Always follow your healthcare professional's instructions.

## 2020-12-14 ENCOUNTER — ANTICOAGULATION THERAPY VISIT (OUTPATIENT)
Dept: FAMILY MEDICINE | Facility: CLINIC | Age: 55
End: 2020-12-14

## 2020-12-14 DIAGNOSIS — Z79.01 LONG TERM CURRENT USE OF ANTICOAGULANTS WITH INR GOAL OF 2.0-3.0: ICD-10-CM

## 2020-12-14 DIAGNOSIS — Z79.01 LONG TERM CURRENT USE OF ANTICOAGULANT THERAPY: ICD-10-CM

## 2020-12-14 DIAGNOSIS — Z86.711 HISTORY OF PULMONARY EMBOLISM: ICD-10-CM

## 2020-12-14 DIAGNOSIS — Z86.718 PERSONAL HISTORY OF DVT (DEEP VEIN THROMBOSIS): ICD-10-CM

## 2020-12-14 DIAGNOSIS — I26.99 OTHER ACUTE PULMONARY EMBOLISM WITHOUT ACUTE COR PULMONALE (H): ICD-10-CM

## 2020-12-14 NOTE — PROGRESS NOTES
ANTICOAGULATION MANAGEMENT     Patient Name:  Schuyler Castro  Date:  2020    ASSESSMENT /SUBJECTIVE:    's INR result of 4.4 is supratherapeutic. Goal INR of 2.0-3.0      Warfarin dose taken: Warfarin taken as instructed    Diet: Decreased oral intake past week due to COVID    Medication changes/ interactions: No new medications/supplements affecting INR    Previous INR: Therapeutic     S/S of bleeding or thromboembolism: Yes: Pt had hematuria which he presented to clinic for . Provider instructed pt to hold doses  and . Hematuria is now resolved.      New injury or illness: Yes: Recent COVID    Upcoming surgery, procedure or cardioversion: No    Additional findings: None      PLAN:    Telephone call with Schuyler regarding INR result and instructed:     Warfarin Dosing Instructions: Continue your current warfarin dose 7.5 mg every Tue; 5mg all other days    Instructed patient to follow up no later than:     Lab visit scheduled    Education provided: Target INR goal and significance of current INR result, Monitoring for bleeding signs and symptoms and Monitoring for clotting signs and symptoms      David verbalizes understanding and agrees to warfarin dosing plan.    Instructed to call the Anticoagulation Clinic for any changes, questions or concerns. (#[phone])        Guille Mims RN      OBJECTIVE:  Recent labs: (last 7 days)     20  1329   INR 4.40*         No question data found.  Anticoagulation Summary  As of 2020    INR goal:  2.0-3.0   TTR:  100.0 % (3.1 mo)   INR used for dosin.40 (2020)   Warfarin maintenance plan:  7.5 mg (5 mg x 1.5) every Tue; 5 mg (5 mg x 1) all other days   Full warfarin instructions:  7.5 mg every Tue; 5 mg all other days   Weekly warfarin total:  37.5 mg   Plan last modified:  Lyly Florian RN (2019)   Next INR check:  2020   Priority:  Maintenance   Target end date:  Indefinite    Indications    Long-term  (current) use of anticoagulants [Z79.01] [Z79.01]  Other acute pulmonary embolism without acute cor pulmonale (H) [I26.99]  Personal history of DVT (deep vein thrombosis) [Z86.718]  History of pulmonary embolism [Z86.711]  Long term current use of anticoagulants with INR goal of 2.0-3.0 [Z79.01]             Anticoagulation Episode Summary     INR check location:      Preferred lab:      Send INR reminders to:  [facility]    Comments:        Anticoagulation Care Providers     Provider Role Specialty Phone number    Rasheeda Juan MD Referring Family Medicine [phone]

## 2020-12-17 DIAGNOSIS — Z86.718 PERSONAL HISTORY OF DVT (DEEP VEIN THROMBOSIS): ICD-10-CM

## 2020-12-17 DIAGNOSIS — Z86.711 HISTORY OF PULMONARY EMBOLISM: ICD-10-CM

## 2020-12-17 DIAGNOSIS — Z79.01 LONG TERM CURRENT USE OF ANTICOAGULANTS WITH INR GOAL OF 2.0-3.0: ICD-10-CM

## 2020-12-17 DIAGNOSIS — I26.99 OTHER ACUTE PULMONARY EMBOLISM WITHOUT ACUTE COR PULMONALE (H): ICD-10-CM

## 2020-12-17 RX ORDER — WARFARIN SODIUM 5 MG/1
TABLET ORAL
Qty: 94 TABLET | Refills: 1 | Status: SHIPPED | OUTPATIENT
Start: 2020-12-17 | End: 2021-06-07

## 2020-12-17 NOTE — TELEPHONE ENCOUNTER
Last INR: 12/14/20=4.4  Next INR: 12/18/20    Prescription approved per [facility] Refill Protocol.    Lyly Florian RN

## 2020-12-18 ENCOUNTER — ANTICOAGULATION THERAPY VISIT (OUTPATIENT)
Dept: NURSING | Facility: CLINIC | Age: 55
End: 2020-12-18

## 2020-12-18 DIAGNOSIS — Z79.01 LONG TERM CURRENT USE OF ANTICOAGULANTS WITH INR GOAL OF 2.0-3.0: ICD-10-CM

## 2020-12-18 DIAGNOSIS — Z86.718 PERSONAL HISTORY OF DVT (DEEP VEIN THROMBOSIS): ICD-10-CM

## 2020-12-18 DIAGNOSIS — Z79.01 LONG TERM CURRENT USE OF ANTICOAGULANT THERAPY: ICD-10-CM

## 2020-12-18 DIAGNOSIS — I26.99 OTHER ACUTE PULMONARY EMBOLISM WITHOUT ACUTE COR PULMONALE (H): ICD-10-CM

## 2020-12-18 DIAGNOSIS — Z86.711 HISTORY OF PULMONARY EMBOLISM: ICD-10-CM

## 2020-12-18 LAB
CAPILLARY BLOOD COLLECTION: NORMAL
INR PPP: 3.3 (ref 0.86–1.14)

## 2020-12-18 PROCEDURE — 99207 PR NO CHARGE NURSE ONLY: CPT

## 2020-12-18 PROCEDURE — 85610 PROTHROMBIN TIME: CPT | Performed by: FAMILY MEDICINE

## 2020-12-18 PROCEDURE — 36416 COLLJ CAPILLARY BLOOD SPEC: CPT | Performed by: FAMILY MEDICINE

## 2020-12-18 NOTE — PROGRESS NOTES
ANTICOAGULATION FOLLOW-UP CLINIC VISIT    Patient Name:  Schuyler Castro  Date:  12/18/2020  Contact Type:  Telephone    SUBJECTIVE:  Patient Findings     Positives:  Change in health (Pt feeling well and is back to work), Missed doses (Intentional hold 12/12 and 12/13 due to supra INR and illness), Change in diet/appetite (Eating is back to a normal diet with consistent amounts of greens.)    Comments:  INR still supra most likely due recent Covid-19 which resulted in diet changes and inactivity x 7 days.  Patient states he has NOT had anymore episodes of hematuria.        Clinical Outcomes     Negatives:  Major bleeding event, Thromboembolic event, Anticoagulation-related hospital admission, Anticoagulation-related ED visit, Anticoagulation-related fatality    Comments:  INR still supra most likely due recent Covid-19 which resulted in diet changes and inactivity x 7 days.  Patient states he has NOT had anymore episodes of hematuria.           OBJECTIVE    Recent labs: (last 7 days)     12/18/20  0805   INR 3.30*       ASSESSMENT / PLAN  INR assessment SUPRA    Recheck INR In: 2 WEEKS    INR Location Clinic      Anticoagulation Summary  As of 12/18/2020    INR goal:  2.0-3.0   TTR:  93.8 % (3.1 mo)   INR used for dosing:  3.30 (12/18/2020)   Warfarin maintenance plan:  7.5 mg (5 mg x 1.5) every Tue; 5 mg (5 mg x 1) all other days   Full warfarin instructions:  12/18: 2.5 mg; Otherwise 7.5 mg every Tue; 5 mg all other days   Weekly warfarin total:  37.5 mg   Plan last modified:  Lyly Florian RN (4/23/2019)   Next INR check:  1/4/2021   Priority:  High   Target end date:  Indefinite    Indications    Long-term (current) use of anticoagulants [Z79.01] [Z79.01]  Other acute pulmonary embolism without acute cor pulmonale (H) [I26.99]  Personal history of DVT (deep vein thrombosis) [Z86.718]  History of pulmonary embolism [Z86.711]  Long term current use of anticoagulants with INR goal of 2.0-3.0 [Z79.01]              Anticoagulation Episode Summary     INR check location:      Preferred lab:      Send INR reminders to:  [facility]    Comments:        Anticoagulation Care Providers     Provider Role Specialty Phone number    Rasheeda Juan MD Referring Family Medicine [phone]            See the Encounter Report to view Anticoagulation Flowsheet and Dosing Calendar (Go to Encounters tab in chart review, and find the Anticoagulation Therapy Visit)        Lyly Florian RN

## 2020-12-31 DIAGNOSIS — E11.9 TYPE 2 DIABETES MELLITUS WITHOUT COMPLICATION, WITHOUT LONG-TERM CURRENT USE OF INSULIN (H): ICD-10-CM

## 2021-01-04 ENCOUNTER — ANTICOAGULATION THERAPY VISIT (OUTPATIENT)
Dept: NURSING | Facility: CLINIC | Age: 56
End: 2021-01-04

## 2021-01-04 DIAGNOSIS — Z79.01 LONG TERM CURRENT USE OF ANTICOAGULANT THERAPY: ICD-10-CM

## 2021-01-04 DIAGNOSIS — Z86.718 PERSONAL HISTORY OF DVT (DEEP VEIN THROMBOSIS): ICD-10-CM

## 2021-01-04 DIAGNOSIS — I26.99 OTHER ACUTE PULMONARY EMBOLISM WITHOUT ACUTE COR PULMONALE (H): ICD-10-CM

## 2021-01-04 DIAGNOSIS — Z86.711 HISTORY OF PULMONARY EMBOLISM: ICD-10-CM

## 2021-01-04 DIAGNOSIS — Z79.01 LONG TERM CURRENT USE OF ANTICOAGULANTS WITH INR GOAL OF 2.0-3.0: ICD-10-CM

## 2021-01-04 LAB
CAPILLARY BLOOD COLLECTION: NORMAL
INR PPP: 2.5 (ref 0.86–1.14)

## 2021-01-04 PROCEDURE — 99207 PR NO CHARGE NURSE ONLY: CPT

## 2021-01-04 PROCEDURE — 36416 COLLJ CAPILLARY BLOOD SPEC: CPT | Performed by: FAMILY MEDICINE

## 2021-01-04 PROCEDURE — 85610 PROTHROMBIN TIME: CPT | Performed by: FAMILY MEDICINE

## 2021-01-04 RX ORDER — GLIPIZIDE 10 MG/1
TABLET, FILM COATED, EXTENDED RELEASE ORAL
Qty: 60 TABLET | Refills: 0 | Status: SHIPPED | OUTPATIENT
Start: 2021-01-04 | End: 2021-02-10

## 2021-01-04 NOTE — TELEPHONE ENCOUNTER
Routing refill request to provider for review/approval because:  Labs not current:  A1C and Creatinine    Nancy Medina RN

## 2021-01-04 NOTE — PROGRESS NOTES
Patient has NOT run out of Zocor, has about 30 left.  Patient has med check appt.with PCP on 01/11/21    Lyly Florian RN

## 2021-01-04 NOTE — PROGRESS NOTES
ANTICOAGULATION FOLLOW-UP CLINIC VISIT    Patient Name:  Schuyler Castro  Date:  2021  Contact Type:  Telephone    SUBJECTIVE:  Patient Findings     Comments:  The patient was assessed for diet, medication, and activity level changes, missed or extra doses, bruising or bleeding, with no problem findings.          Clinical Outcomes     Negatives:  Major bleeding event, Thromboembolic event, Anticoagulation-related hospital admission, Anticoagulation-related ED visit, Anticoagulation-related fatality    Comments:  The patient was assessed for diet, medication, and activity level changes, missed or extra doses, bruising or bleeding, with no problem findings.             OBJECTIVE    Recent labs: (last 7 days)     21  0916   INR 2.50*       ASSESSMENT / PLAN  INR assessment THER    Recheck INR In: 4 WEEKS    INR Location Clinic      Anticoagulation Summary  As of 2021    INR goal:  2.0-3.0   TTR:  87.0 % (3.1 mo)   INR used for dosin.50 (2021)   Warfarin maintenance plan:  7.5 mg (5 mg x 1.5) every Tue; 5 mg (5 mg x 1) all other days   Full warfarin instructions:  7.5 mg every Tue; 5 mg all other days   Weekly warfarin total:  37.5 mg   No change documented:  Lyly Florian, RN   Plan last modified:  Lyly Florian, RN (2019)   Next INR check:  2021   Priority:  Maintenance   Target end date:  Indefinite    Indications    Long-term (current) use of anticoagulants [Z79.01] [Z79.01]  Other acute pulmonary embolism without acute cor pulmonale (H) [I26.99]  Personal history of DVT (deep vein thrombosis) [Z86.718]  History of pulmonary embolism [Z86.711]  Long term current use of anticoagulants with INR goal of 2.0-3.0 [Z79.01]             Anticoagulation Episode Summary     INR check location:      Preferred lab:      Send INR reminders to:  [facility]    Comments:        Anticoagulation Care Providers     Provider Role Specialty Phone number    Rasheeda Juan MD Referring Family  Medicine [phone]            See the Encounter Report to view Anticoagulation Flowsheet and Dosing Calendar (Go to Encounters tab in chart review, and find the Anticoagulation Therapy Visit)        Lyly Florian RN

## 2021-01-05 DIAGNOSIS — E78.5 HYPERLIPIDEMIA LDL GOAL <100: ICD-10-CM

## 2021-01-06 NOTE — TELEPHONE ENCOUNTER
Routing refill request to provider for review/approval because:  Labs not current:  LDL  A break in medication    Nancy Medina RN

## 2021-01-07 RX ORDER — SIMVASTATIN 20 MG
20 TABLET ORAL AT BEDTIME
Qty: 30 TABLET | Refills: 0 | Status: SHIPPED | OUTPATIENT
Start: 2021-01-07 | End: 2021-02-10

## 2021-01-07 NOTE — TELEPHONE ENCOUNTER
Please call, pt needs to be seen.  Rasheeda Juan MD  [facility]  [phone]

## 2021-01-26 DIAGNOSIS — E11.9 TYPE 2 DIABETES MELLITUS WITHOUT COMPLICATION, WITHOUT LONG-TERM CURRENT USE OF INSULIN (H): ICD-10-CM

## 2021-01-26 DIAGNOSIS — I10 HYPERTENSION GOAL BP (BLOOD PRESSURE) < 140/90: ICD-10-CM

## 2021-01-27 RX ORDER — LISINOPRIL AND HYDROCHLOROTHIAZIDE 12.5; 2 MG/1; MG/1
TABLET ORAL
Qty: 180 TABLET | Refills: 0 | Status: SHIPPED | OUTPATIENT
Start: 2021-01-27 | End: 2021-04-21

## 2021-01-27 NOTE — TELEPHONE ENCOUNTER
Routing refill request to provider for review/approval because:  A break in medication-has not filled x several months, lisinopril/hctz  Pt has appointment scheduled next week  Sue Rubio RN, BSN  Message handled by CLINIC NURSE.

## 2021-01-28 RX ORDER — GLIPIZIDE 10 MG/1
TABLET, FILM COATED, EXTENDED RELEASE ORAL
Qty: 60 TABLET | Refills: 0 | OUTPATIENT
Start: 2021-01-28

## 2021-01-28 NOTE — TELEPHONE ENCOUNTER
Glipizide not filled since 1/4/21.  Nancy Medina RN    
Has enough til appt.  Nancy Medina RN    
Medication refilled 01/27/2021 by Dr Juan.    Silvana Colunga/JIMMIE    
Patient has upcoming appt, please call to see if needs refill to bridge to upcoming appt.    Odin FUENTES RN, BSN   
Abdominal Pain, N/V/D

## 2021-02-05 ENCOUNTER — ANTICOAGULATION THERAPY VISIT (OUTPATIENT)
Dept: NURSING | Facility: CLINIC | Age: 56
End: 2021-02-05

## 2021-02-05 DIAGNOSIS — Z86.711 HISTORY OF PULMONARY EMBOLISM: ICD-10-CM

## 2021-02-05 DIAGNOSIS — I26.99 OTHER ACUTE PULMONARY EMBOLISM WITHOUT ACUTE COR PULMONALE (H): ICD-10-CM

## 2021-02-05 DIAGNOSIS — Z86.718 PERSONAL HISTORY OF DVT (DEEP VEIN THROMBOSIS): ICD-10-CM

## 2021-02-05 DIAGNOSIS — Z79.01 LONG TERM CURRENT USE OF ANTICOAGULANTS WITH INR GOAL OF 2.0-3.0: ICD-10-CM

## 2021-02-05 DIAGNOSIS — Z79.01 LONG TERM CURRENT USE OF ANTICOAGULANT THERAPY: ICD-10-CM

## 2021-02-05 LAB
CAPILLARY BLOOD COLLECTION: NORMAL
INR PPP: 1.9 (ref 0.86–1.14)

## 2021-02-05 PROCEDURE — 99207 PR NO CHARGE NURSE ONLY: CPT

## 2021-02-05 PROCEDURE — 36416 COLLJ CAPILLARY BLOOD SPEC: CPT | Performed by: FAMILY MEDICINE

## 2021-02-05 PROCEDURE — 85610 PROTHROMBIN TIME: CPT | Performed by: FAMILY MEDICINE

## 2021-02-05 NOTE — PROGRESS NOTES
ANTICOAGULATION FOLLOW-UP CLINIC VISIT    Patient Name:  Schuyler Castro  Date:  2021  Contact Type:  Telephone    SUBJECTIVE:  Patient Findings     Positives:  Change in diet/appetite (Pt states he typically eats 1-2 servings of greens per week. In the past week, he had a  salad and about 2 servings of a casserole with spinach.)    Comments:  Patient scheduled med for 21        Clinical Outcomes     Negatives:  Major bleeding event, Thromboembolic event, Anticoagulation-related hospital admission, Anticoagulation-related ED visit, Anticoagulation-related fatality    Comments:  Patient scheduled med for 21           OBJECTIVE    Recent labs: (last 7 days)     21  0709   INR 1.90*       ASSESSMENT / PLAN  INR assessment SUB    Recheck INR In: 2 WEEKS    INR Location Clinic      Anticoagulation Summary  As of 2021    INR goal:  2.0-3.0   TTR:  81.3 % (3.1 mo)   INR used for dosin.90 (2021)   Warfarin maintenance plan:  7.5 mg (5 mg x 1.5) every Tue; 5 mg (5 mg x 1) all other days   Full warfarin instructions:  : 7.5 mg; Otherwise 7.5 mg every Tue; 5 mg all other days   Weekly warfarin total:  37.5 mg   Plan last modified:  Lyly Florian RN (2019)   Next INR check:  2021   Priority:  High   Target end date:  Indefinite    Indications    Long-term (current) use of anticoagulants [Z79.01] [Z79.01]  Other acute pulmonary embolism without acute cor pulmonale (H) [I26.99]  Personal history of DVT (deep vein thrombosis) [Z86.718]  History of pulmonary embolism [Z86.711]  Long term current use of anticoagulants with INR goal of 2.0-3.0 [Z79.01]             Anticoagulation Episode Summary     INR check location:      Preferred lab:      Send INR reminders to:  [facility]    Comments:        Anticoagulation Care Providers     Provider Role Specialty Phone number    Rasheeda Juan MD Referring Family Medicine [phone]            See the Encounter Report to view  Anticoagulation Flowsheet and Dosing Calendar (Go to Encounters tab in chart review, and find the Anticoagulation Therapy Visit)        Lyly Florian RN

## 2021-02-05 NOTE — PROGRESS NOTES
Patient also stated that he usually takes his warfarin at 6pm but 1 day last week, he took the dose at 10pm.    Lyly Florian RN

## 2021-02-10 DIAGNOSIS — E78.5 HYPERLIPIDEMIA LDL GOAL <100: ICD-10-CM

## 2021-02-10 DIAGNOSIS — E11.9 TYPE 2 DIABETES MELLITUS WITHOUT COMPLICATION, WITHOUT LONG-TERM CURRENT USE OF INSULIN (H): ICD-10-CM

## 2021-02-10 RX ORDER — GLIPIZIDE 10 MG/1
TABLET, FILM COATED, EXTENDED RELEASE ORAL
Qty: 60 TABLET | Refills: 0 | Status: SHIPPED | OUTPATIENT
Start: 2021-02-10 | End: 2021-03-08

## 2021-02-10 RX ORDER — SIMVASTATIN 20 MG
TABLET ORAL
Qty: 30 TABLET | Refills: 0 | Status: SHIPPED | OUTPATIENT
Start: 2021-02-10 | End: 2021-03-08

## 2021-02-10 NOTE — TELEPHONE ENCOUNTER
Can we check with patient, why is he scheduled with Dr. Mallory?  If he wishes to switch provider, then no problem.  But if he doesn't, then please schedule an appointment with me instead, it will be much easier and keep patient's care persistent.

## 2021-02-10 NOTE — TELEPHONE ENCOUNTER
Routed to TC, see AA note, call pt to confirm appointment  Sue Rubio RN, BSN  Message handled by CLINIC NURSE.

## 2021-02-10 NOTE — TELEPHONE ENCOUNTER
Routing refill request to provider for review/approval because:  La given x1 - pt has appt in 2 weeks (rescheduled from 2/4/21)

## 2021-02-11 NOTE — TELEPHONE ENCOUNTER
Spoke with patient -he stated he just wanted try another provider this visit and not to change anything, nothing against Dr valiente.    Silvana Colunga/JIMMIE

## 2021-02-17 DIAGNOSIS — E11.9 TYPE 2 DIABETES MELLITUS WITHOUT COMPLICATION, WITHOUT LONG-TERM CURRENT USE OF INSULIN (H): ICD-10-CM

## 2021-02-18 NOTE — TELEPHONE ENCOUNTER
Medication is being filled for 1 time refill only due to:  Patient needs to be seen because DM visit.  Pt has appointment scheduled  Prescription approved per [facility] Refill Protocol.  Sue Rubio RN, BSN  Message handled by CLINIC NURSE.

## 2021-02-19 ENCOUNTER — ANTICOAGULATION THERAPY VISIT (OUTPATIENT)
Dept: NURSING | Facility: CLINIC | Age: 56
End: 2021-02-19

## 2021-02-19 DIAGNOSIS — Z86.711 HISTORY OF PULMONARY EMBOLISM: ICD-10-CM

## 2021-02-19 DIAGNOSIS — Z86.718 PERSONAL HISTORY OF DVT (DEEP VEIN THROMBOSIS): ICD-10-CM

## 2021-02-19 DIAGNOSIS — Z79.01 LONG TERM CURRENT USE OF ANTICOAGULANT THERAPY: ICD-10-CM

## 2021-02-19 DIAGNOSIS — Z79.01 LONG TERM CURRENT USE OF ANTICOAGULANTS WITH INR GOAL OF 2.0-3.0: ICD-10-CM

## 2021-02-19 DIAGNOSIS — I26.99 OTHER ACUTE PULMONARY EMBOLISM WITHOUT ACUTE COR PULMONALE (H): ICD-10-CM

## 2021-02-19 LAB
CAPILLARY BLOOD COLLECTION: NORMAL
INR PPP: 2.8 (ref 0.86–1.14)

## 2021-02-19 PROCEDURE — 85610 PROTHROMBIN TIME: CPT | Performed by: FAMILY MEDICINE

## 2021-02-19 PROCEDURE — 36416 COLLJ CAPILLARY BLOOD SPEC: CPT | Performed by: FAMILY MEDICINE

## 2021-02-19 PROCEDURE — 99207 PR NO CHARGE NURSE ONLY: CPT

## 2021-02-19 NOTE — PROGRESS NOTES
ANTICOAGULATION FOLLOW-UP CLINIC VISIT    Patient Name:  Schuyler Castro  Date:  2021  Contact Type:  Telephone    SUBJECTIVE:  Patient Findings     Comments:  The patient was assessed for diet, medication, and activity level changes, missed or extra doses, bruising or bleeding, with no problem findings.          Clinical Outcomes     Negatives:  Major bleeding event, Thromboembolic event, Anticoagulation-related hospital admission, Anticoagulation-related ED visit, Anticoagulation-related fatality    Comments:  The patient was assessed for diet, medication, and activity level changes, missed or extra doses, bruising or bleeding, with no problem findings.             OBJECTIVE    Recent labs: (last 7 days)     21  0815   INR 2.80*       ASSESSMENT / PLAN  INR assessment THER    Recheck INR In: 4 WEEKS    INR Location Clinic      Anticoagulation Summary  As of 2021    INR goal:  2.0-3.0   TTR:  79.7 % (3.1 mo)   INR used for dosin.80 (2021)   Warfarin maintenance plan:  7.5 mg (5 mg x 1.5) every Tue; 5 mg (5 mg x 1) all other days   Full warfarin instructions:  7.5 mg every Tue; 5 mg all other days   Weekly warfarin total:  37.5 mg   Plan last modified:  Lyly Florian RN (2021)   Next INR check:  3/18/2021   Priority:  Maintenance   Target end date:  Indefinite    Indications    Long-term (current) use of anticoagulants [Z79.01] [Z79.01]  Other acute pulmonary embolism without acute cor pulmonale (H) [I26.99]  Personal history of DVT (deep vein thrombosis) [Z86.718]  History of pulmonary embolism [Z86.711]  Long term current use of anticoagulants with INR goal of 2.0-3.0 [Z79.01]             Anticoagulation Episode Summary     INR check location:      Preferred lab:      Send INR reminders to:  [facility] [employer] [place]    Comments:        Anticoagulation Care Providers     Provider Role Specialty Phone number    Rasheeda Juan MD Referring Family Medicine [phone]            See the  Encounter Report to view Anticoagulation Flowsheet and Dosing Calendar (Go to Encounters tab in chart review, and find the Anticoagulation Therapy Visit)        Lyly Florian RN

## 2021-03-18 ENCOUNTER — ANTICOAGULATION THERAPY VISIT (OUTPATIENT)
Dept: NURSING | Facility: CLINIC | Age: 56
End: 2021-03-18

## 2021-03-18 ENCOUNTER — DOCUMENTATION ONLY (OUTPATIENT)
Dept: NURSING | Facility: CLINIC | Age: 56
End: 2021-03-18

## 2021-03-18 DIAGNOSIS — Z86.718 PERSONAL HISTORY OF DVT (DEEP VEIN THROMBOSIS): ICD-10-CM

## 2021-03-18 DIAGNOSIS — Z86.718 PERSONAL HISTORY OF DVT (DEEP VEIN THROMBOSIS): Primary | ICD-10-CM

## 2021-03-18 DIAGNOSIS — Z79.01 LONG TERM CURRENT USE OF ANTICOAGULANT THERAPY: ICD-10-CM

## 2021-03-18 DIAGNOSIS — Z86.711 HISTORY OF PULMONARY EMBOLISM: ICD-10-CM

## 2021-03-18 DIAGNOSIS — Z79.01 LONG TERM CURRENT USE OF ANTICOAGULANTS WITH INR GOAL OF 2.0-3.0: ICD-10-CM

## 2021-03-18 DIAGNOSIS — I26.99 OTHER ACUTE PULMONARY EMBOLISM WITHOUT ACUTE COR PULMONALE (H): ICD-10-CM

## 2021-03-18 LAB
CAPILLARY BLOOD COLLECTION: NORMAL
INR PPP: 2.4 (ref 0.86–1.14)

## 2021-03-18 PROCEDURE — 85610 PROTHROMBIN TIME: CPT | Performed by: FAMILY MEDICINE

## 2021-03-18 PROCEDURE — 99207 PR NO CHARGE NURSE ONLY: CPT

## 2021-03-18 PROCEDURE — 36416 COLLJ CAPILLARY BLOOD SPEC: CPT | Performed by: FAMILY MEDICINE

## 2021-03-18 NOTE — PROGRESS NOTES
"ANTICOAGULATION FOLLOW-UP CLINIC VISIT    Patient Name:  Schuyler Castro  Date:  3/18/2021  Contact Type:  Telephone    SUBJECTIVE:  Patient Findings     Positives:  Change in health (got 1st Moderna Covid-19 vaccine on 21, pt states he felt \"blah\" for 1 day, otherwise no side effects.)    Comments:  The patient was assessed for diet, medication, and activity level changes, missed or extra doses, bruising or bleeding, with no problem findings.          Clinical Outcomes     Negatives:  Major bleeding event, Thromboembolic event, Anticoagulation-related hospital admission, Anticoagulation-related ED visit, Anticoagulation-related fatality    Comments:  The patient was assessed for diet, medication, and activity level changes, missed or extra doses, bruising or bleeding, with no problem findings.             OBJECTIVE    Recent labs: (last 7 days)     21  0804   INR 2.40*       ASSESSMENT / PLAN  INR assessment THER    Recheck INR In: 12 WEEKS    INR Location Clinic      Anticoagulation Summary  As of 3/18/2021    INR goal:  2.0-3.0   TTR:  80.1 % (3.2 mo)   INR used for dosin.40 (3/18/2021)   Warfarin maintenance plan:  7.5 mg (5 mg x 1.5) every Tue; 5 mg (5 mg x 1) all other days   Full warfarin instructions:  7.5 mg every Tue; 5 mg all other days   Weekly warfarin total:  37.5 mg   No change documented:  Lyly Florian RN   Plan last modified:  Lyly Florian RN (2021)   Next INR check:  6/10/2021   Priority:  Maintenance   Target end date:  Indefinite    Indications    Long-term (current) use of anticoagulants [Z79.01] [Z79.01]  Other acute pulmonary embolism without acute cor pulmonale (H) [I26.99]  Personal history of DVT (deep vein thrombosis) [Z86.718]  History of pulmonary embolism [Z86.711]  Long term current use of anticoagulants with INR goal of 2.0-3.0 [Z79.01]             Anticoagulation Episode Summary     INR check location:      Preferred lab:      Send INR reminders to:  [facility] [place]    Comments:        Anticoagulation Care Providers     Provider Role Specialty Phone number    Rasheeda Juan MD Referring Family Medicine [phone]            See the Encounter Report to view Anticoagulation Flowsheet and Dosing Calendar (Go to Encounters tab in chart review, and find the Anticoagulation Therapy Visit)        Lyly Florian RN

## 2021-03-18 NOTE — PROGRESS NOTES
ANTICOAGULATION MANAGEMENT      Schuyler Castro due for annual renewal of referral to anticoagulation monitoring. Order pended for your review and signature.      ANTICOAGULATION SUMMARY      Warfarin indication(s)     DVT, PE    Heart valve present?  NO       Current goal range   INR: 2.0-3.0     Goal appropriate for indication? Yes, INR 2-3 appropriate for hx of DVT, PE, hypercoagulable state, Afib, LVAD, or bileaflet AVR without risk factors     Current duration of therapy Indefinite/long term therapy   Time in Therapeutic Range (TTR)  (Goal > 60%) 80 %       Office visit with referring provider's group within last year yes on 06/17/2020       Lyly Florian RN

## 2021-03-18 NOTE — PROGRESS NOTES
Patient considering starting zinc, vitamin D and vitamin C, should not be any direct interaction with Warfarin.  Patient will let [facility] know when he starts and what doses he is taking.    Lyly Florian RN

## 2021-04-01 DIAGNOSIS — E11.9 TYPE 2 DIABETES MELLITUS WITHOUT COMPLICATION, WITHOUT LONG-TERM CURRENT USE OF INSULIN (H): ICD-10-CM

## 2021-04-01 DIAGNOSIS — E78.5 HYPERLIPIDEMIA LDL GOAL <100: ICD-10-CM

## 2021-04-01 RX ORDER — GLIPIZIDE 10 MG/1
TABLET, FILM COATED, EXTENDED RELEASE ORAL
Qty: 60 TABLET | Refills: 0 | Status: SHIPPED | OUTPATIENT
Start: 2021-04-01 | End: 2021-05-04

## 2021-04-01 RX ORDER — SIMVASTATIN 20 MG
TABLET ORAL
Qty: 30 TABLET | Refills: 0 | Status: SHIPPED | OUTPATIENT
Start: 2021-04-01 | End: 2021-05-04

## 2021-04-01 NOTE — TELEPHONE ENCOUNTER
Last refill until appointment   Refills extended, pt has apt scheduled  Prescription approved per [facility] Refill Protocol.  Sue Rubio RN, BSN  Message handled by CLINIC NURSE.

## 2021-04-08 DIAGNOSIS — I10 HYPERTENSION GOAL BP (BLOOD PRESSURE) < 140/90: ICD-10-CM

## 2021-04-08 RX ORDER — ATENOLOL 50 MG/1
TABLET ORAL
Qty: 30 TABLET | Refills: 0 | Status: SHIPPED | OUTPATIENT
Start: 2021-04-08 | End: 2021-05-04

## 2021-04-08 NOTE — TELEPHONE ENCOUNTER
Routing refill request to provider for review/approval because:  A break in medication  Failing bp    Nancy Medina RN

## 2021-04-20 ENCOUNTER — TELEPHONE (OUTPATIENT)
Dept: FAMILY MEDICINE | Facility: CLINIC | Age: 56
End: 2021-04-20

## 2021-04-20 DIAGNOSIS — I10 HYPERTENSION GOAL BP (BLOOD PRESSURE) < 140/90: ICD-10-CM

## 2021-04-20 NOTE — TELEPHONE ENCOUNTER
Summary:    Patient is due/failing the following:   Eye exam    Reviewed:  [x] CARE EVERYWHERE  [x] LAST OV NOTE INCLUDING ENDO  [x] FYI TAB  [x] MYCHART ACTIVE?  [x] LAST PANEL ENCOUNTER  [x] FUTURE APPTS  [x] IMMUNIZATIONS          Action needed:   Patient needs office visit for eye exam.    Type of outreach:    Sent [email or web address] message.                                                                               Felicia Spencer/[facility]

## 2021-04-21 RX ORDER — LISINOPRIL AND HYDROCHLOROTHIAZIDE 12.5; 2 MG/1; MG/1
TABLET ORAL
Qty: 180 TABLET | Refills: 0 | Status: SHIPPED | OUTPATIENT
Start: 2021-04-21 | End: 2021-07-19

## 2021-04-21 NOTE — TELEPHONE ENCOUNTER
Routing refill request to provider for review/approval because:  Labs not current:  BMP  Failing bp    Nancy Medina RN

## 2021-05-29 DIAGNOSIS — E78.5 HYPERLIPIDEMIA LDL GOAL <100: ICD-10-CM

## 2021-05-29 DIAGNOSIS — I10 HYPERTENSION GOAL BP (BLOOD PRESSURE) < 140/90: ICD-10-CM

## 2021-05-29 DIAGNOSIS — E11.9 TYPE 2 DIABETES MELLITUS WITHOUT COMPLICATION, WITHOUT LONG-TERM CURRENT USE OF INSULIN (H): ICD-10-CM

## 2021-05-30 ENCOUNTER — HEALTH MAINTENANCE LETTER (OUTPATIENT)
Age: 56
End: 2021-05-30

## 2021-06-02 RX ORDER — GLIPIZIDE 10 MG/1
TABLET, FILM COATED, EXTENDED RELEASE ORAL
Qty: 60 TABLET | Refills: 0 | Status: SHIPPED | OUTPATIENT
Start: 2021-06-02 | End: 2021-06-30

## 2021-06-02 RX ORDER — SIMVASTATIN 20 MG
TABLET ORAL
Qty: 30 TABLET | Refills: 0 | Status: SHIPPED | OUTPATIENT
Start: 2021-06-02 | End: 2021-06-30

## 2021-06-02 RX ORDER — ATENOLOL 50 MG/1
TABLET ORAL
Qty: 30 TABLET | Refills: 0 | Status: SHIPPED | OUTPATIENT
Start: 2021-06-02 | End: 2021-06-30

## 2021-06-02 NOTE — TELEPHONE ENCOUNTER
Pt has appointment scheduled, refills extended 1 time  Prescription approved per [facility] [place] [place] Refill Protocol.  Sue Rubio RN, BSN

## 2021-06-07 ENCOUNTER — ANTICOAGULATION THERAPY VISIT (OUTPATIENT)
Dept: NURSING | Facility: CLINIC | Age: 56
End: 2021-06-07

## 2021-06-07 ENCOUNTER — TELEPHONE (OUTPATIENT)
Dept: FAMILY MEDICINE | Facility: CLINIC | Age: 56
End: 2021-06-07

## 2021-06-07 DIAGNOSIS — Z86.718 PERSONAL HISTORY OF DVT (DEEP VEIN THROMBOSIS): ICD-10-CM

## 2021-06-07 DIAGNOSIS — I26.99 OTHER ACUTE PULMONARY EMBOLISM WITHOUT ACUTE COR PULMONALE (H): ICD-10-CM

## 2021-06-07 DIAGNOSIS — Z79.01 LONG TERM CURRENT USE OF ANTICOAGULANT THERAPY: ICD-10-CM

## 2021-06-07 DIAGNOSIS — Z86.711 HISTORY OF PULMONARY EMBOLISM: ICD-10-CM

## 2021-06-07 DIAGNOSIS — Z79.01 LONG TERM CURRENT USE OF ANTICOAGULANTS WITH INR GOAL OF 2.0-3.0: ICD-10-CM

## 2021-06-07 LAB
CAPILLARY BLOOD COLLECTION: NORMAL
INR PPP: 2.4 (ref 0.86–1.14)

## 2021-06-07 PROCEDURE — 99207 PR NO CHARGE NURSE ONLY: CPT

## 2021-06-07 PROCEDURE — 85610 PROTHROMBIN TIME: CPT | Performed by: FAMILY MEDICINE

## 2021-06-07 PROCEDURE — 36416 COLLJ CAPILLARY BLOOD SPEC: CPT | Performed by: FAMILY MEDICINE

## 2021-06-07 RX ORDER — WARFARIN SODIUM 5 MG/1
TABLET ORAL
Qty: 94 TABLET | Refills: 1 | Status: SHIPPED | OUTPATIENT
Start: 2021-06-07 | End: 2021-12-06

## 2021-06-07 NOTE — TELEPHONE ENCOUNTER
OK to continue with 12 week INR monitoring as long as INR and/or Factor 10 are in range?  Patient was extended to 12 weeks during Covid-19 pandemic, please let [facility] know if OK to continue this.     We are required to verify this annually.     Thank you,  Lyly Florian RN

## 2021-06-07 NOTE — PROGRESS NOTES
ANTICOAGULATION FOLLOW-UP CLINIC VISIT    Patient Name:  Schuyler Castro  Date:  2021  Contact Type:  Telephone    SUBJECTIVE:  Patient Findings     Comments:  The patient was assessed for diet, medication, and activity level changes, missed or extra doses, bruising or bleeding, with no problem findings.  Routed TE to PCP to verify if 12 week INR monitoring is OK to continue when patient's INR is in range.  Patient's INR interval was extended during height of Covid-.        Clinical Outcomes     Negatives:  Major bleeding event, Thromboembolic event, Anticoagulation-related hospital admission, Anticoagulation-related ED visit, Anticoagulation-related fatality    Comments:  The patient was assessed for diet, medication, and activity level changes, missed or extra doses, bruising or bleeding, with no problem findings.  Routed TE to PCP to verify if 12 week INR monitoring is OK to continue when patient's INR is in range.  Patient's INR interval was extended during height of Covid- pandemic.           OBJECTIVE    Recent labs: (last 7 days)     21  0807   INR 2.40*       ASSESSMENT / PLAN  INR assessment THER    Recheck INR In: 12 WEEKS    INR Location Clinic      Anticoagulation Summary  As of 2021    INR goal:  2.0-3.0   TTR:  89.2 % (5.9 mo)   INR used for dosin.40 (2021)   Warfarin maintenance plan:  7.5 mg (5 mg x 1.5) every Tue; 5 mg (5 mg x 1) all other days   Full warfarin instructions:  7.5 mg every Tue; 5 mg all other days   Weekly warfarin total:  37.5 mg   Plan last modified:  Lyly Florian RN (2021)   Next INR check:  2021   Priority:  Maintenance   Target end date:  Indefinite    Indications    Long-term (current) use of anticoagulants [Z79.01] [Z79.01]  Other acute pulmonary embolism without acute cor pulmonale (H) [I26.99]  Personal history of DVT (deep vein thrombosis) [Z86.718]  History of pulmonary embolism [Z86.711]  Long term current use of  anticoagulants with INR goal of 2.0-3.0 [Z79.01]             Anticoagulation Episode Summary     INR check location:      Preferred lab:      Send INR reminders to:  [facility] [place]    Comments:        Anticoagulation Care Providers     Provider Role Specialty Phone number    Rasheeda Juan MD Referring Family Medicine [phone]            See the Encounter Report to view Anticoagulation Flowsheet and Dosing Calendar (Go to Encounters tab in chart review, and find the Anticoagulation Therapy Visit)        Lyly Florian RN

## 2021-06-28 DIAGNOSIS — E11.9 TYPE 2 DIABETES MELLITUS WITHOUT COMPLICATION, WITHOUT LONG-TERM CURRENT USE OF INSULIN (H): ICD-10-CM

## 2021-06-28 DIAGNOSIS — E78.5 HYPERLIPIDEMIA LDL GOAL <100: ICD-10-CM

## 2021-06-28 DIAGNOSIS — I10 HYPERTENSION GOAL BP (BLOOD PRESSURE) < 140/90: ICD-10-CM

## 2021-06-30 RX ORDER — SIMVASTATIN 20 MG
TABLET ORAL
Qty: 30 TABLET | Refills: 0 | Status: SHIPPED | OUTPATIENT
Start: 2021-06-30 | End: 2021-08-18

## 2021-06-30 RX ORDER — GLIPIZIDE 10 MG/1
TABLET, FILM COATED, EXTENDED RELEASE ORAL
Qty: 60 TABLET | Refills: 0 | Status: SHIPPED | OUTPATIENT
Start: 2021-06-30 | End: 2021-08-18

## 2021-06-30 RX ORDER — ATENOLOL 50 MG/1
TABLET ORAL
Qty: 30 TABLET | Refills: 0 | Status: SHIPPED | OUTPATIENT
Start: 2021-06-30 | End: 2021-08-18

## 2021-06-30 NOTE — TELEPHONE ENCOUNTER
Pt canceled June appointment and rescheduled, refills extended again, sent pharmacy message  Prescription approved per [facility] Refill Protocol.  Sue Rubio RN, BSN  Message handled by CLINIC NURSE.

## 2021-07-18 DIAGNOSIS — I10 HYPERTENSION GOAL BP (BLOOD PRESSURE) < 140/90: ICD-10-CM

## 2021-07-19 RX ORDER — LISINOPRIL AND HYDROCHLOROTHIAZIDE 12.5; 2 MG/1; MG/1
TABLET ORAL
Qty: 180 TABLET | Refills: 0 | Status: SHIPPED | OUTPATIENT
Start: 2021-07-19 | End: 2021-08-18

## 2021-07-19 NOTE — TELEPHONE ENCOUNTER
Refill extended, pt has appointment scheduled  Prescription approved per [facility] Refill Protocol.    Sue Rubio RN, BSN  Message handled by CLINIC NURSE.

## 2021-08-05 DIAGNOSIS — E11.9 TYPE 2 DIABETES MELLITUS WITHOUT COMPLICATION, WITHOUT LONG-TERM CURRENT USE OF INSULIN (H): ICD-10-CM

## 2021-08-05 DIAGNOSIS — I10 HYPERTENSION GOAL BP (BLOOD PRESSURE) < 140/90: ICD-10-CM

## 2021-08-05 RX ORDER — ATENOLOL 50 MG/1
TABLET ORAL
Qty: 7 TABLET | Refills: 0 | OUTPATIENT
Start: 2021-08-05

## 2021-08-05 RX ORDER — GLIPIZIDE 10 MG/1
TABLET, FILM COATED, EXTENDED RELEASE ORAL
Qty: 14 TABLET | Refills: 0 | OUTPATIENT
Start: 2021-08-05

## 2021-08-05 NOTE — TELEPHONE ENCOUNTER
Keeps getting refill and cancelling appts.  Has cancelled 7 appts with Dr. Mallory.  Nancy Medina RN    Routing refill request to provider for review/approval because:  Labs not current:  A1C and Creatinine  Failing bp    Nancy Medina RN

## 2021-08-13 DIAGNOSIS — Z86.718 PERSONAL HISTORY OF DVT (DEEP VEIN THROMBOSIS): ICD-10-CM

## 2021-08-13 DIAGNOSIS — Z79.01 LONG TERM CURRENT USE OF ANTICOAGULANTS WITH INR GOAL OF 2.0-3.0: Primary | ICD-10-CM

## 2021-08-13 DIAGNOSIS — Z86.711 HISTORY OF PULMONARY EMBOLISM: ICD-10-CM

## 2021-08-18 ENCOUNTER — LAB (OUTPATIENT)
Dept: LAB | Facility: CLINIC | Age: 56
End: 2021-08-18
Payer: COMMERCIAL

## 2021-08-18 ENCOUNTER — VIRTUAL VISIT (OUTPATIENT)
Dept: FAMILY MEDICINE | Facility: CLINIC | Age: 56
End: 2021-08-18
Payer: COMMERCIAL

## 2021-08-18 DIAGNOSIS — E78.5 HYPERLIPIDEMIA LDL GOAL <100: ICD-10-CM

## 2021-08-18 DIAGNOSIS — Z29.9 ENCOUNTER FOR PREVENTIVE MEASURE: ICD-10-CM

## 2021-08-18 DIAGNOSIS — I10 HYPERTENSION GOAL BP (BLOOD PRESSURE) < 140/90: ICD-10-CM

## 2021-08-18 DIAGNOSIS — E66.01 MORBID OBESITY (H): ICD-10-CM

## 2021-08-18 DIAGNOSIS — E11.65 TYPE 2 DIABETES MELLITUS WITH HYPERGLYCEMIA, WITHOUT LONG-TERM CURRENT USE OF INSULIN (H): ICD-10-CM

## 2021-08-18 DIAGNOSIS — Z86.711 HISTORY OF PULMONARY EMBOLISM: ICD-10-CM

## 2021-08-18 DIAGNOSIS — E11.65 TYPE 2 DIABETES MELLITUS WITH HYPERGLYCEMIA, WITHOUT LONG-TERM CURRENT USE OF INSULIN (H): Primary | ICD-10-CM

## 2021-08-18 LAB
ALBUMIN SERPL-MCNC: 4.1 G/DL (ref 3.4–5)
ALP SERPL-CCNC: 57 U/L (ref 40–150)
ALT SERPL W P-5'-P-CCNC: 55 U/L (ref 0–70)
ANION GAP SERPL CALCULATED.3IONS-SCNC: 12 MMOL/L (ref 3–14)
AST SERPL W P-5'-P-CCNC: 38 U/L (ref 0–45)
BILIRUB SERPL-MCNC: 0.5 MG/DL (ref 0.2–1.3)
BUN SERPL-MCNC: 24 MG/DL (ref 7–30)
CALCIUM SERPL-MCNC: 9.6 MG/DL (ref 8.5–10.1)
CHLORIDE BLD-SCNC: 101 MMOL/L (ref 94–109)
CHOLEST SERPL-MCNC: 188 MG/DL
CO2 SERPL-SCNC: 20 MMOL/L (ref 20–32)
CREAT SERPL-MCNC: 1.4 MG/DL (ref 0.66–1.25)
CREAT UR-MCNC: 322 MG/DL
FASTING STATUS PATIENT QL REPORTED: YES
GFR SERPL CREATININE-BSD FRML MDRD: 57 ML/MIN/1.73M2
GLUCOSE BLD-MCNC: 288 MG/DL (ref 70–99)
HBA1C MFR BLD: 10.1 % (ref 0–5.6)
HBV CORE AB SERPL QL IA: NONREACTIVE
HBV SURFACE AB SERPL IA-ACNC: 1.18 M[IU]/ML
HCV AB SERPL QL IA: NONREACTIVE
HDLC SERPL-MCNC: 46 MG/DL
LDLC SERPL CALC-MCNC: 101 MG/DL
MICROALBUMIN UR-MCNC: 33 MG/L
MICROALBUMIN/CREAT UR: 10.25 MG/G CR (ref 0–17)
NONHDLC SERPL-MCNC: 142 MG/DL
POTASSIUM BLD-SCNC: 4.5 MMOL/L (ref 3.4–5.3)
PROT SERPL-MCNC: 8 G/DL (ref 6.8–8.8)
SODIUM SERPL-SCNC: 133 MMOL/L (ref 133–144)
TRIGL SERPL-MCNC: 203 MG/DL
TSH SERPL DL<=0.005 MIU/L-ACNC: 3.2 MU/L (ref 0.4–4)

## 2021-08-18 PROCEDURE — 86803 HEPATITIS C AB TEST: CPT

## 2021-08-18 PROCEDURE — 86704 HEP B CORE ANTIBODY TOTAL: CPT

## 2021-08-18 PROCEDURE — 86706 HEP B SURFACE ANTIBODY: CPT

## 2021-08-18 PROCEDURE — 80061 LIPID PANEL: CPT

## 2021-08-18 PROCEDURE — 84443 ASSAY THYROID STIM HORMONE: CPT

## 2021-08-18 PROCEDURE — 83036 HEMOGLOBIN GLYCOSYLATED A1C: CPT

## 2021-08-18 PROCEDURE — 80053 COMPREHEN METABOLIC PANEL: CPT

## 2021-08-18 PROCEDURE — 82043 UR ALBUMIN QUANTITATIVE: CPT

## 2021-08-18 PROCEDURE — 99214 OFFICE O/P EST MOD 30 MIN: CPT | Mod: 95 | Performed by: FAMILY MEDICINE

## 2021-08-18 PROCEDURE — 36415 COLL VENOUS BLD VENIPUNCTURE: CPT

## 2021-08-18 RX ORDER — GLIPIZIDE 10 MG/1
TABLET, FILM COATED, EXTENDED RELEASE ORAL
Qty: 180 TABLET | Refills: 0 | Status: SHIPPED | OUTPATIENT
Start: 2021-08-18 | End: 2021-10-26

## 2021-08-18 RX ORDER — LISINOPRIL AND HYDROCHLOROTHIAZIDE 12.5; 2 MG/1; MG/1
TABLET ORAL
Qty: 180 TABLET | Refills: 0 | Status: SHIPPED | OUTPATIENT
Start: 2021-08-18 | End: 2022-02-10

## 2021-08-18 RX ORDER — ROSUVASTATIN CALCIUM 40 MG/1
40 TABLET, COATED ORAL DAILY
Qty: 90 TABLET | Refills: 0 | Status: SHIPPED | OUTPATIENT
Start: 2021-08-18 | End: 2021-10-26

## 2021-08-18 RX ORDER — ATENOLOL 50 MG/1
50 TABLET ORAL DAILY
Qty: 90 TABLET | Refills: 0 | Status: SHIPPED | OUTPATIENT
Start: 2021-08-18 | End: 2021-10-26

## 2021-08-18 NOTE — PROGRESS NOTES
David is a 55 year old who is being evaluated via a billable video visit.      How would you like to obtain your AVS? MyChart  If the video visit is dropped, the invitation should be resent by: Text to cell phone: [phone]  Will anyone else be joining your video visit? No    Video Start Time: 9::06 A    Assessment & Plan   Problem List Items Addressed This Visit     Encounter for preventive measure     Routine         Relevant Orders    Hepatitis C antibody    Hepatitis B Surface Antibody    Hepatitis B core antibody    History of pulmonary embolism     Indefinite anticoagulation         Hyperlipidemia LDL goal <100     Risk 12.4%.  Increase in statin potency         Relevant Medications    rosuvastatin (CRESTOR) 40 MG tablet    Other Relevant Orders    Comprehensive metabolic panel (BMP + Alb, Alk Phos, ALT, AST, Total. Bili, TP)    Lipid panel reflex to direct LDL Non-fasting    Hypertension goal BP (blood pressure) < 140/90     Not controlled at last measure.  Remeasure, reassess         Relevant Medications    atenolol (TENORMIN) 50 MG tablet    lisinopril-hydrochlorothiazide (ZESTORETIC) 20-12.5 MG tablet    Morbid obesity (H)     He believes he is up 30 pounds.  Refer         Relevant Medications    glipiZIDE (GLUCOTROL XL) 10 MG 24 hr tablet    metFORMIN (GLUCOPHAGE) 500 MG tablet    Other Relevant Orders    Comprehensive Weight Management    Type 2 diabetes mellitus with hyperglycemia (H) - Primary     Not controlled at last measure.  Reassess         Relevant Medications    glipiZIDE (GLUCOTROL XL) 10 MG 24 hr tablet    metFORMIN (GLUCOPHAGE) 500 MG tablet    rosuvastatin (CRESTOR) 40 MG tablet    Other Relevant Orders    Comprehensive metabolic panel (BMP + Alb, Alk Phos, ALT, AST, Total. Bili, TP)    Lipid panel reflex to direct LDL Non-fasting    Hemoglobin A1c (Completed)    TSH with free T4 reflex    Albumin Random Urine Quantitative with Creat Ratio    Adult Eye Referral                         Return in about 3 months (around 11/18/2021) for  , Lab Work, appt now, RTC PE 3 mos.    Michael Mallory MD  [facility] TRUDY Hernandez is a 55 year old who presents for the following health issues     HPI     Diabetes Follow-up      How often are you checking your blood sugar? Not at all    What concerns do you have today about your diabetes? Other: pt just wants to get back on track      Do you have any of these symptoms? (Select all that apply)  No numbness or tingling in feet.  No redness, sores or blisters on feet.  No complaints of excessive thirst.  No reports of blurry vision.  No significant changes to weight.    Have you had a diabetic eye exam in the last 12 months? No          Hyperlipidemia Follow-Up      Are you regularly taking any medication or supplement to lower your cholesterol?   Yes- pt takes his medication every day     Are you having muscle aches or other side effects that you think could be caused by your cholesterol lowering medication?  No    Hypertension Follow-up      Do you check your blood pressure regularly outside of the clinic? No     Are you following a low salt diet? Yes    Are your blood pressures ever more than 140 on the top number (systolic) OR more   than 90 on the bottom number (diastolic), for example 140/90? No, pt got it checked at a clinic and January and said it was normal     BP Readings from Last 2 Encounters:   12/12/20 (!) 156/84   04/24/19 132/82     Hemoglobin A1C (%)   Date Value   08/18/2021 10.1 (H)   04/24/2019 9.5 (H)   05/24/2018 8.9 (H)     LDL Cholesterol Calculated (mg/dL)   Date Value   04/24/2019 80   05/24/2018 83           Past Medical History:   Diagnosis Date     Diabetes mellitus type 2, noninsulin dependent (H) 2002     DVT of deep femoral vein (H) 8/2010     Hypertension 1996     Pulmonary embolus (H) 8/2010     Type 2 diabetes mellitus with hyperglycemia (H) 10/25/2015       History reviewed. No pertinent  surgical history.    Family History   Problem Relation Age of Onset     Neurologic Disorder Mother         mysthejuan pablo gravchioma     Dementia Father        Social History     Tobacco Use     Smoking status: Never Smoker     Smokeless tobacco: Never Used   Substance Use Topics     Alcohol use: Not Currently     Alcohol/week: 0.0 - 1.0 standard drinks         Review of Systems   Weight is increasing no neuropathic or visual symptoms      Objective    Vitals - Patient Reported  Weight (Patient Reported): 124.7 kg (275 lb)      Vitals:  No vitals were obtained today due to virtual visit.    Physical Exam   GENERAL: Healthy, alert and no distress  EYES: Eyes grossly normal to inspection.  No discharge or erythema, or obvious scleral/conjunctival abnormalities.  RESP: No audible wheeze, cough, or visible cyanosis.  No visible retractions or increased work of breathing.    SKIN: Visible skin clear. No significant rash, abnormal pigmentation or lesions.  NEURO: Cranial nerves grossly intact.  Mentation and speech appropriate for age.  PSYCH: Mentation appears normal, affect normal/bright, judgement and insight intact, normal speech and appearance well-groomed.                Video-Visit Details    Type of service:  Video Visit    Video End Time:9:16 AM    Originating Location (pt. Location): Home    Distant Location (provider location):  [facility] [place]     Platform used for Video Visit: [employer]

## 2021-08-18 NOTE — PROGRESS NOTES
The 10-year ASCVD risk score (Matt VALDIVIA Jr., et al., 2013) is: 12.4%    Values used to calculate the score:      Age: 55 years      Sex: Male      Is Non- : No      Diabetic: Yes      Tobacco smoker: No      Systolic Blood Pressure: 156 mmHg      Is BP treated: Yes      HDL Cholesterol: 40 mg/dL      Total Cholesterol: 150 mg/dL  Michael Mallory MD

## 2021-08-19 DIAGNOSIS — E11.65 TYPE 2 DIABETES MELLITUS WITH HYPERGLYCEMIA, WITHOUT LONG-TERM CURRENT USE OF INSULIN (H): Primary | ICD-10-CM

## 2021-08-19 RX ORDER — ORAL SEMAGLUTIDE 14 MG/1
1 TABLET ORAL DAILY
Qty: 90 TABLET | Refills: 1 | Status: SHIPPED | OUTPATIENT
Start: 2021-08-19 | End: 2022-04-06

## 2021-08-19 RX ORDER — ORAL SEMAGLUTIDE 7 MG/1
1 TABLET ORAL DAILY
Qty: 30 TABLET | Refills: 0 | Status: SHIPPED | OUTPATIENT
Start: 2021-08-19 | End: 2021-12-10 | Stop reason: DRUGHIGH

## 2021-08-19 RX ORDER — ORAL SEMAGLUTIDE 3 MG/1
1 TABLET ORAL DAILY
Qty: 30 TABLET | Refills: 0 | Status: SHIPPED | OUTPATIENT
Start: 2021-08-19 | End: 2021-12-10 | Stop reason: DRUGHIGH

## 2021-08-19 NOTE — RESULT ENCOUNTER NOTE
Looks pretty good, except the diabetes.  There is a medicine that I would recommend that can help with weight as well as diabetes.  It is also very expensive.  I have sent it to the pharmacy.  We should find out how well your insurance covers it.  Lets check blood tests again in 3 months  successful weight loss will help  Michael Mallory MD

## 2021-08-20 DIAGNOSIS — E11.9 TYPE 2 DIABETES MELLITUS WITHOUT COMPLICATIONS (H): ICD-10-CM

## 2021-08-26 ENCOUNTER — ANTICOAGULATION THERAPY VISIT (OUTPATIENT)
Dept: ANTICOAGULATION | Facility: CLINIC | Age: 56
End: 2021-08-26

## 2021-08-26 ENCOUNTER — LAB (OUTPATIENT)
Dept: LAB | Facility: CLINIC | Age: 56
End: 2021-08-26
Payer: COMMERCIAL

## 2021-08-26 DIAGNOSIS — Z86.711 HISTORY OF PULMONARY EMBOLISM: Primary | ICD-10-CM

## 2021-08-26 DIAGNOSIS — Z79.01 LONG TERM CURRENT USE OF ANTICOAGULANTS WITH INR GOAL OF 2.0-3.0: ICD-10-CM

## 2021-08-26 DIAGNOSIS — Z86.718 PERSONAL HISTORY OF DVT (DEEP VEIN THROMBOSIS): ICD-10-CM

## 2021-08-26 DIAGNOSIS — Z86.711 HISTORY OF PULMONARY EMBOLISM: ICD-10-CM

## 2021-08-26 LAB — INR BLD: 2.6 (ref 0.9–1.1)

## 2021-08-26 PROCEDURE — 85610 PROTHROMBIN TIME: CPT

## 2021-08-26 PROCEDURE — 36416 COLLJ CAPILLARY BLOOD SPEC: CPT

## 2021-08-26 NOTE — PROGRESS NOTES
ANTICOAGULATION MANAGEMENT     Schuyler Castro 56 year old male is on warfarin with therapeutic INR result. (Goal INR 2.0-3.0)    Recent labs: (last 7 days)     08/26/21  0812   INR 2.6*       ASSESSMENT     Source(s): Patient/Caregiver Call       Warfarin doses taken: Warfarin taken as instructed    Diet: No new diet changes identified    New illness, injury, or hospitalization: No    Medication/supplement changes: Semaglutide prescribed on 8/18/21, pt started it that day. Crestor also prescribed on 8/18/21 to replace Zocor, patient plans to start the crestor today, 8/26/21.    Concurrent use of SEMAGLUTIDE and WARFARIN may result in increased warfarin exposure.    Concurrent use of ROSUVASTATIN and WARFARIN may result in increase in INR and increased risk of bleeding.        Signs or symptoms of bleeding or clotting: No    Previous INR: Therapeutic last 2(+) visits    Additional findings: uncontrolled Diabetes per 8/18/21 result note from provider     PLAN     Recommended plan for ongoing change(s) affecting INR     Dosing Instructions: Continue your current warfarin dose with next INR in 1 week       Summary  As of 8/26/2021    Full warfarin instructions:  7.5 mg every Tue; 5 mg all other days   Next INR check:  9/3/2021             Telephone call with Schuyler who verbalizes understanding and agrees to plan    Lab visit scheduled    Education provided: Contact [phone]  with any changes, questions or concerns. and how Semaglutide and/or Crestor can affect INR.    Plan made per [facility] anticoagulation protocol    Lyly Florian RN  Anticoagulation Clinic  8/26/2021    _______________________________________________________________________     Anticoagulation Episode Summary     Current INR goal:  2.0-3.0   TTR:  92.6 % (8.5 mo)   Target end date:  Indefinite   Send INR reminders to:  [facility] [place]    Indications    Long-term (current) use of anticoagulants [Z79.01] (Resolved) [Z79.01]  Other acute  pulmonary embolism without acute cor pulmonale (H) (Resolved) [I26.99]  Personal history of DVT (deep vein thrombosis) (Resolved) [Z86.718]  History of pulmonary embolism [Z86.711]  Long term current use of anticoagulants with INR goal of 2.0-3.0 [Z79.01]           Comments:           Anticoagulation Care Providers     Provider Role Specialty Phone number    Rasheeda Juan MD Referring Family Medicine [phone]

## 2021-08-26 NOTE — PROGRESS NOTES
Anticoagulation Management    Unable to reach David today.    Today's INR result of 2.6 is therapeutic (goal INR of 2.0-3.0).  Result received from: Clinic Lab    Follow up required to assess for changes     Left VM to call [place] with transfer to [facility] OR route to:   [facility] [place]      Anticoagulation clinic to follow up    Lyly Florian RN

## 2021-09-03 ENCOUNTER — ANTICOAGULATION THERAPY VISIT (OUTPATIENT)
Dept: ANTICOAGULATION | Facility: CLINIC | Age: 56
End: 2021-09-03

## 2021-09-03 ENCOUNTER — LAB (OUTPATIENT)
Dept: LAB | Facility: CLINIC | Age: 56
End: 2021-09-03
Payer: COMMERCIAL

## 2021-09-03 DIAGNOSIS — Z86.718 PERSONAL HISTORY OF DVT (DEEP VEIN THROMBOSIS): ICD-10-CM

## 2021-09-03 DIAGNOSIS — Z79.01 LONG TERM CURRENT USE OF ANTICOAGULANTS WITH INR GOAL OF 2.0-3.0: ICD-10-CM

## 2021-09-03 DIAGNOSIS — Z86.711 HISTORY OF PULMONARY EMBOLISM: ICD-10-CM

## 2021-09-03 DIAGNOSIS — Z86.711 HISTORY OF PULMONARY EMBOLISM: Primary | ICD-10-CM

## 2021-09-03 LAB — INR BLD: 3 (ref 0.9–1.1)

## 2021-09-03 PROCEDURE — 36416 COLLJ CAPILLARY BLOOD SPEC: CPT

## 2021-09-03 PROCEDURE — 85610 PROTHROMBIN TIME: CPT

## 2021-09-03 NOTE — PROGRESS NOTES
ANTICOAGULATION MANAGEMENT     Schuyler Castro 56 year old male is on warfarin with therapeutic INR result. (Goal INR 2.0-3.0)    Recent labs: (last 7 days)     09/03/21  0807   INR 3.0*       ASSESSMENT     Source(s): Patient/Caregiver Call       Warfarin doses taken: Warfarin taken as instructed    Diet: No new diet changes identified; however, patient has only been eating 1 parul salad per week, he agrees to increase to 3 small parul salads per week since INR is trending up with medication changes.    New illness, injury, or hospitalization: No    Medication/supplement changes: Yes, started Semaglutide 8/18/21--dose is titrating up Q30 days.    Started Crestor 8/26/21    Concurrent use of SEMAGLUTIDE and WARFARIN may result in increased warfarin exposure.    Concurrent use of ROSUVASTATIN and WARFARIN may result in increase in INR and increased risk of bleeding.    Signs or symptoms of bleeding or clotting: No    Previous INR: Therapeutic last 2(+) visits    Additional findings: None     PLAN     Recommended plan for ongoing change(s) affecting INR     Dosing Instructions: Continue your current warfarin dose with next INR in 10 days       Summary  As of 9/3/2021    Full warfarin instructions:  7.5 mg every Tue; 5 mg all other days   Next INR check:  9/14/2021             Telephone call with Schuyler who verbalizes understanding and agrees to plan    Lab visit scheduled    Education provided: Monitoring for bleeding signs and symptoms and Contact [phone]  with any changes, questions or concerns. Patient understands that warfarin MD may need to be decreased if INR continues to trend up due to medication changes.    Plan made per [facility] anticoagulation protocol    Lyly Florian, RN  Anticoagulation Clinic  9/3/2021    _______________________________________________________________________     Anticoagulation Episode Summary     Current INR goal:  2.0-3.0   TTR:  92.8 % (8.8 mo)   Target end date:  Indefinite    Send INR reminders to:  [facility] [place]    Indications    Long-term (current) use of anticoagulants [Z79.01] (Resolved) [Z79.01]  Other acute pulmonary embolism without acute cor pulmonale (H) (Resolved) [I26.99]  Personal history of DVT (deep vein thrombosis) (Resolved) [Z86.718]  History of pulmonary embolism [Z86.711]  Long term current use of anticoagulants with INR goal of 2.0-3.0 [Z79.01]           Comments:           Anticoagulation Care Providers     Provider Role Specialty Phone number    Rasheeda Juan MD Referring Family Medicine [phone]

## 2021-09-03 NOTE — PROGRESS NOTES
Anticoagulation Management    Unable to reach David today.    Today's INR result of 3.0 is therapeutic (goal INR of 2.0-3.0).  Result received from: Clinic Lab    Follow up required to confirm warfarin dose taken and assess for changes    Left VM to call Declan with transfer to [facility] OR route to:   [facility]      Anticoagulation clinic to follow up    Lyly Florian RN

## 2021-09-11 ENCOUNTER — OFFICE VISIT (OUTPATIENT)
Dept: URGENT CARE | Facility: URGENT CARE | Age: 56
End: 2021-09-11
Payer: COMMERCIAL

## 2021-09-11 VITALS
RESPIRATION RATE: 18 BRPM | OXYGEN SATURATION: 97 % | SYSTOLIC BLOOD PRESSURE: 140 MMHG | TEMPERATURE: 99 F | WEIGHT: 279.7 LBS | HEART RATE: 86 BPM | DIASTOLIC BLOOD PRESSURE: 90 MMHG | BODY MASS INDEX: 38.47 KG/M2

## 2021-09-11 DIAGNOSIS — Z79.01 LONG TERM CURRENT USE OF ANTICOAGULANTS WITH INR GOAL OF 2.0-3.0: ICD-10-CM

## 2021-09-11 DIAGNOSIS — R82.90 BAD ODOR OF URINE: ICD-10-CM

## 2021-09-11 DIAGNOSIS — R31.9 HEMATURIA, UNSPECIFIED TYPE: Primary | ICD-10-CM

## 2021-09-11 LAB
ALBUMIN UR-MCNC: 30 MG/DL
APPEARANCE UR: ABNORMAL
BACTERIA #/AREA URNS HPF: ABNORMAL /HPF
BILIRUB UR QL STRIP: NEGATIVE
COLOR UR AUTO: ABNORMAL
GLUCOSE UR STRIP-MCNC: 500 MG/DL
HGB UR QL STRIP: ABNORMAL
INR BLD: 2.8 (ref 0.9–1.1)
KETONES UR STRIP-MCNC: ABNORMAL MG/DL
LEUKOCYTE ESTERASE UR QL STRIP: NEGATIVE
NITRATE UR QL: NEGATIVE
PH UR STRIP: 5.5 [PH] (ref 5–7)
RBC #/AREA URNS AUTO: ABNORMAL /HPF
SP GR UR STRIP: 1.02 (ref 1–1.03)
SQUAMOUS #/AREA URNS AUTO: ABNORMAL /LPF
UROBILINOGEN UR STRIP-ACNC: 0.2 E.U./DL
WBC #/AREA URNS AUTO: ABNORMAL /HPF

## 2021-09-11 PROCEDURE — 99214 OFFICE O/P EST MOD 30 MIN: CPT | Performed by: FAMILY MEDICINE

## 2021-09-11 PROCEDURE — 81001 URINALYSIS AUTO W/SCOPE: CPT

## 2021-09-11 PROCEDURE — 87086 URINE CULTURE/COLONY COUNT: CPT | Performed by: FAMILY MEDICINE

## 2021-09-11 PROCEDURE — 36415 COLL VENOUS BLD VENIPUNCTURE: CPT | Performed by: FAMILY MEDICINE

## 2021-09-11 PROCEDURE — 85610 PROTHROMBIN TIME: CPT | Performed by: FAMILY MEDICINE

## 2021-09-11 RX ORDER — CEFDINIR 300 MG/1
300 CAPSULE ORAL 2 TIMES DAILY
Qty: 14 CAPSULE | Refills: 0 | Status: SHIPPED | OUTPATIENT
Start: 2021-09-11 | End: 2021-09-18

## 2021-09-11 NOTE — PROGRESS NOTES
SUBJECTIVE:   Schuyler Castro is a 56 year old male who  presents today for a possible UTI.     Had medication changes by primary provider, told that may cause INR irregularity.  Patient had INR checked 9/3 at 3.0.  Has been on coumadin for long time, did have episodes of bleeding before and had to stop coumadin for a couple of days.  Has appointment for follow up check on Tuesdays.    Notice bleeding when urinates this morning.  Denies dysuria or frequency, no fever.    Past Medical History:   Diagnosis Date     Diabetes mellitus type 2, noninsulin dependent (H) 2002     DVT of deep femoral vein (H) 8/2010     Hypertension 1996     Pulmonary embolus (H) 8/2010     Type 2 diabetes mellitus with hyperglycemia (H) 10/25/2015     Current Outpatient Medications   Medication Sig Dispense Refill     cefdinir (OMNICEF) 300 MG capsule Take 1 capsule (300 mg) by mouth 2 times daily for 7 days 14 capsule 0     Acetaminophen (TYLENOL PO) Take 650 mg by mouth       aspirin 81 MG tablet Take 1 tablet (81 mg) by mouth daily 30 tablet 12     atenolol (TENORMIN) 50 MG tablet Take 1 tablet (50 mg) by mouth daily 90 tablet 0     blood glucose (NO BRAND SPECIFIED) lancets standard Use to test blood sugar 2-3 times daily or as directed. 90 each 11     blood glucose (NO BRAND SPECIFIED) test strip Use to test blood sugar 2-3 times daily or as directed. 100 each 11     blood glucose monitoring (NO BRAND SPECIFIED) meter device kit Use to test blood sugar 2-3 times daily or as directed. 1 kit 0     glipiZIDE (GLUCOTROL XL) 10 MG 24 hr tablet TAKE 2 TABLETS BY MOUTH EVERY  tablet 0     lisinopril-hydrochlorothiazide (ZESTORETIC) 20-12.5 MG tablet TAKE 2 TABLETS BY MOUTH EVERY  tablet 0     metFORMIN (GLUCOPHAGE) 500 MG tablet TAKE 2 TABLETS BY MOUTH TWICE A DAY WITH MEALS 360 tablet 0     rosuvastatin (CRESTOR) 40 MG tablet Take 1 tablet (40 mg) by mouth daily 90 tablet 0     Semaglutide (RYBELSUS) 14 MG TABS Take 1 each by  mouth daily Use third 90 tablet 1     Semaglutide (RYBELSUS) 3 MG TABS Take 1 each by mouth daily Use first 30 tablet 0     Semaglutide (RYBELSUS) 7 MG TABS Take 1 each by mouth daily Use second 30 tablet 0     warfarin ANTICOAGULANT (COUMADIN) 5 MG tablet Take 7.5mg every Tu / Take 5mg all other days OR as instructed by INR clinic 94 tablet 1     Social History     Tobacco Use     Smoking status: Never Smoker     Smokeless tobacco: Never Used   Substance Use Topics     Alcohol use: Not Currently     Alcohol/week: 0.0 - 1.0 standard drinks       ROS:   Review of systems negative except as stated above.    OBJECTIVE:  BP (!) 140/90 (BP Location: Right arm, Patient Position: Chair, Cuff Size: Adult Large)   Pulse 86   Temp 99  F (37.2  C) (Oral)   Resp 18   Wt 126.9 kg (279 lb 11.2 oz)   SpO2 97%   BMI 38.47 kg/m    GENERAL APPEARANCE: healthy, alert and no distress  PSYCH: mentation appears normal and affect normal/bright    Results for orders placed or performed in visit on 09/11/21   UA Macro with Reflex to Micro and Culture - lab collect     Status: Abnormal    Specimen: Urine, Clean Catch   Result Value Ref Range    Color Urine Brown (A) Colorless, Straw, Light Yellow, Yellow    Appearance Urine Slightly Cloudy (A) Clear    Glucose Urine 500  (A) Negative mg/dL    Bilirubin Urine Negative Negative    Ketones Urine Trace (A) Negative mg/dL    Specific Gravity Urine 1.020 1.003 - 1.035    Blood Urine Large (A) Negative    pH Urine 5.5 5.0 - 7.0    Protein Albumin Urine 30  (A) Negative mg/dL    Urobilinogen Urine 0.2 0.2, 1.0 E.U./dL    Nitrite Urine Negative Negative    Leukocyte Esterase Urine Negative Negative   Urine Microscopic     Status: Abnormal   Result Value Ref Range    Bacteria Urine Few (A) None Seen /HPF    RBC Urine  (A) 0-2 /HPF /HPF    WBC Urine 0-5 0-5 /HPF /HPF    Squamous Epithelials Urine Few (A) None Seen /LPF    Narrative    Urine Culture not indicated   INR point of care      Status: Abnormal   Result Value Ref Range    INR 2.8 (H) 0.9 - 1.1    Narrative    This test is intended for monitoring Coumadin therapy. Results are not accurate in patients with prolonged INR due to factor deficiency.       ASSESSMENT/PLAN:   (R31.9) Hematuria, unspecified type  (primary encounter diagnosis)  Comment: UTI  Plan: UA Macro with Reflex to Micro and Culture - lab        collect, Urine Microscopic, cefdinir (OMNICEF)         300 MG capsule, INR point of care            (R82.90) Bad odor of urine  Comment: UTI  Plan: Urine Culture Aerobic Bacterial - lab collect,         cefdinir (OMNICEF) 300 MG capsule            (Z79.01) Long term current use of anticoagulants with INR goal of 2.0-3.0  Comment: stable  Plan: INR point of care            Reviewed initial labs and empiric treatment for presumptive UTI with RX Omnicef.  Will follow up on urine culture and adjust medication if needed.  Reviewed that Omnicef should not cause INR fluctuation but should review with INR clinic - has appointment for INR check in 3 days. Drink plenty of fluids.    Recommend to continue with current coumadin dosing regimen.    Follow up with primary provider within 1 week if no improvement of symptoms.    Dario Parikh MD  September 11, 2021 1:38 PM

## 2021-09-11 NOTE — PATIENT INSTRUCTIONS
Take Omnicef for possible infection in bladder that is causing blood in urine.    We will contact you once the urine culture is finalized if any changes are needed.    Drink lots of water  Continue with current coumadin dosing regimen.

## 2021-09-13 ENCOUNTER — TELEPHONE (OUTPATIENT)
Dept: FAMILY MEDICINE | Facility: CLINIC | Age: 56
End: 2021-09-13

## 2021-09-13 ENCOUNTER — ANTICOAGULATION THERAPY VISIT (OUTPATIENT)
Dept: ANTICOAGULATION | Facility: CLINIC | Age: 56
End: 2021-09-13

## 2021-09-13 DIAGNOSIS — Z86.711 HISTORY OF PULMONARY EMBOLISM: Primary | ICD-10-CM

## 2021-09-13 DIAGNOSIS — Z79.01 LONG TERM CURRENT USE OF ANTICOAGULANTS WITH INR GOAL OF 2.0-3.0: ICD-10-CM

## 2021-09-13 LAB — BACTERIA UR CULT: NO GROWTH

## 2021-09-13 NOTE — TELEPHONE ENCOUNTER
Reason for Call:  Other call back    Detailed comments: Patient is returning call to INR, please call again. Thank you.    Phone Number Patient can be reached at: [phone]    Best Time: any    Can we leave a detailed message on this number? YES    Call taken on 9/13/2021 at 12:04 PM by Samara Sylvester

## 2021-09-13 NOTE — PROGRESS NOTES
ANTICOAGULATION MANAGEMENT     Schuyler Castro 56 year old male is on warfarin with therapeutic INR result. (Goal INR 2.0-3.0)    Recent labs: (last 7 days)     09/11/21  1323   INR 2.8*       ASSESSMENT     Source(s): Patient/Caregiver Call       Warfarin doses taken: Warfarin taken as instructed    Diet: No new diet changes identified    New illness, injury, or hospitalization: Yes: Patient was seen in UC on 9/11/21 for hematuria and odorous urine, U/C is still in process.    Medication/supplement changes: Omnicef 9/11-9/18/21 which may increase risk of bleeding    Semaglutide dose will increase on 9/19/21    Signs or symptoms of bleeding or clotting: Yes: dark urine only on day of UC visit on 9/11/21, NO bleeding episodes since.    Previous INR: Therapeutic last 2(+) visits    Additional findings: None     PLAN     Recommended plan for temporary change(s) affecting INR     Dosing Instructions: Continue your current warfarin dose with next INR in 2 weeks       Summary  As of 9/13/2021    Full warfarin instructions:  7.5 mg every Tue; 5 mg all other days   Next INR check:  9/28/2021             Telephone call with Schuyler who verbalizes understanding and agrees to plan    Lab visit scheduled    Education provided: Contact [phone]  with any changes, questions or concerns.     Plan made per [facility] anticoagulation protocol    Lyly Florian RN  Anticoagulation Clinic  9/13/2021    _______________________________________________________________________     Anticoagulation Episode Summary     Current INR goal:  2.0-3.0   TTR:  93.0 % (9.1 mo)   Target end date:  Indefinite   Send INR reminders to:  [facility] [place]    Indications    Long-term (current) use of anticoagulants [Z79.01] (Resolved) [Z79.01]  Other acute pulmonary embolism without acute cor pulmonale (H) (Resolved) [I26.99]  Personal history of DVT (deep vein thrombosis) (Resolved) [Z86.718]  History of pulmonary embolism [Z86.711]  Long term current  use of anticoagulants with INR goal of 2.0-3.0 [Z79.01]           Comments:           Anticoagulation Care Providers     Provider Role Specialty Phone number    Rasheeda Juan MD Referring Family Medicine [phone]

## 2021-09-13 NOTE — PROGRESS NOTES
Anticoagulation Management    Unable to reach David today.    Today's INR result of 2.8 is therapeutic (goal INR of 2.0-3.0).  Result received from: Clinic Lab    Follow up required to assess for changes    Patient seen in UC on 9/11 for hematuria, prescribed Omnicef for probable UTI, U/C still in process.    Left VM to call Feura Bush with transfer to [facility] OR route to:   [facility]          Anticoagulation clinic to follow up    Lyly Florian RN

## 2021-09-19 ENCOUNTER — HEALTH MAINTENANCE LETTER (OUTPATIENT)
Age: 56
End: 2021-09-19

## 2021-09-28 ENCOUNTER — LAB (OUTPATIENT)
Dept: LAB | Facility: CLINIC | Age: 56
End: 2021-09-28
Payer: COMMERCIAL

## 2021-09-28 ENCOUNTER — ANTICOAGULATION THERAPY VISIT (OUTPATIENT)
Dept: ANTICOAGULATION | Facility: CLINIC | Age: 56
End: 2021-09-28

## 2021-09-28 DIAGNOSIS — Z79.01 LONG TERM CURRENT USE OF ANTICOAGULANTS WITH INR GOAL OF 2.0-3.0: ICD-10-CM

## 2021-09-28 DIAGNOSIS — Z86.718 PERSONAL HISTORY OF DVT (DEEP VEIN THROMBOSIS): ICD-10-CM

## 2021-09-28 DIAGNOSIS — Z86.711 HISTORY OF PULMONARY EMBOLISM: ICD-10-CM

## 2021-09-28 DIAGNOSIS — Z86.711 HISTORY OF PULMONARY EMBOLISM: Primary | ICD-10-CM

## 2021-09-28 LAB — INR BLD: 2.7 (ref 0.9–1.1)

## 2021-09-28 PROCEDURE — 85610 PROTHROMBIN TIME: CPT

## 2021-09-28 PROCEDURE — 36416 COLLJ CAPILLARY BLOOD SPEC: CPT

## 2021-09-28 NOTE — PROGRESS NOTES
ANTICOAGULATION MANAGEMENT     Schuyler Castro 56 year old male is on warfarin with therapeutic INR result. (Goal INR 2.0-3.0)    Recent labs: (last 7 days)     09/28/21  0718   INR 2.7*       ASSESSMENT     Source(s): Chart Review and Patient/Caregiver Call       Warfarin doses taken: Warfarin taken as instructed    Diet: No new diet changes identified    New illness, injury, or hospitalization: Yes: UTI symptoms have resolved    Medication/supplement changes: Completed Omnicef about 10 days ago.  Semaglutide dose increased (does not interact with warfarin)    Signs or symptoms of bleeding or clotting: No    Previous INR: Therapeutic last 2(+) visits    Additional findings: None     PLAN     Recommended plan for no diet, medication or health factor changes affecting INR     Dosing Instructions: Continue your current warfarin dose with next INR in 4 weeks       Summary  As of 9/28/2021    Full warfarin instructions:  7.5 mg every Tue; 5 mg all other days   Next INR check:  10/26/2021             Telephone call with Schuyler who verbalizes understanding and agrees to plan    Lab visit scheduled    Education provided: Please call back if any changes to your diet, medications or how you've been taking warfarin    Plan made per [facility] anticoagulation protocol    Violeta Sanchez RN  Anticoagulation Clinic  9/28/2021    _______________________________________________________________________     Anticoagulation Episode Summary     Current INR goal:  2.0-3.0   TTR:  93.4 % (9.6 mo)   Target end date:  Indefinite   Send INR reminders to:  [facility] [place]    Indications    Long-term (current) use of anticoagulants [Z79.01] (Resolved) [Z79.01]  Other acute pulmonary embolism without acute cor pulmonale (H) (Resolved) [I26.99]  Personal history of DVT (deep vein thrombosis) (Resolved) [Z86.718]  History of pulmonary embolism [Z86.711]  Long term current use of anticoagulants with INR goal of 2.0-3.0 [Z79.01]            Comments:           Anticoagulation Care Providers     Provider Role Specialty Phone number    Rasheeda Juan MD Referring Family Medicine [phone]

## 2021-10-24 DIAGNOSIS — I10 HYPERTENSION GOAL BP (BLOOD PRESSURE) < 140/90: ICD-10-CM

## 2021-10-24 DIAGNOSIS — E11.65 TYPE 2 DIABETES MELLITUS WITH HYPERGLYCEMIA, WITHOUT LONG-TERM CURRENT USE OF INSULIN (H): ICD-10-CM

## 2021-10-24 DIAGNOSIS — E11.9 TYPE 2 DIABETES MELLITUS WITHOUT COMPLICATIONS (H): ICD-10-CM

## 2021-10-25 ENCOUNTER — ANTICOAGULATION THERAPY VISIT (OUTPATIENT)
Dept: ANTICOAGULATION | Facility: CLINIC | Age: 56
End: 2021-10-25

## 2021-10-25 ENCOUNTER — LAB (OUTPATIENT)
Dept: LAB | Facility: CLINIC | Age: 56
End: 2021-10-25
Payer: COMMERCIAL

## 2021-10-25 DIAGNOSIS — Z79.01 LONG TERM CURRENT USE OF ANTICOAGULANTS WITH INR GOAL OF 2.0-3.0: ICD-10-CM

## 2021-10-25 DIAGNOSIS — Z86.711 HISTORY OF PULMONARY EMBOLISM: ICD-10-CM

## 2021-10-25 DIAGNOSIS — Z86.711 HISTORY OF PULMONARY EMBOLISM: Primary | ICD-10-CM

## 2021-10-25 DIAGNOSIS — Z86.718 PERSONAL HISTORY OF DVT (DEEP VEIN THROMBOSIS): ICD-10-CM

## 2021-10-25 LAB — INR BLD: 2.3 (ref 0.9–1.1)

## 2021-10-25 PROCEDURE — 85610 PROTHROMBIN TIME: CPT

## 2021-10-25 PROCEDURE — 36416 COLLJ CAPILLARY BLOOD SPEC: CPT

## 2021-10-25 NOTE — PROGRESS NOTES
Correction:  Semaglutide DOES affect INR:  Concurrent use of SEMAGLUTIDE and WARFARIN may result in increased warfarin exposure.    Lyly Florian RN

## 2021-10-25 NOTE — PROGRESS NOTES
ANTICOAGULATION MANAGEMENT     Schuyler Castro 56 year old male is on warfarin with therapeutic INR result. (Goal INR 2.0-3.0)    Recent labs: (last 7 days)     10/25/21  0735   INR 2.3*       ASSESSMENT     Source(s): Patient/Caregiver Call       Warfarin doses taken: Warfarin taken as instructed    Diet: Pt notes a decreased appetite since starting Semaglutide. Pt also reports that he drank a meal replacement drink x 1 recently, he will not be resume as he noticed the drink had 20% vitamin K in it.    New illness, injury, or hospitalization: No    Medication/supplement changes: Pt is now on the highest dose of Semaglutide--14mg    Signs or symptoms of bleeding or clotting: No    Previous INR: Therapeutic last 2(+) visits    Additional findings: None     PLAN     Recommended plan for temporary change(s) affecting INR     Dosing Instructions: Continue your current warfarin dose with next INR in 6 weeks       Summary  As of 10/25/2021    Full warfarin instructions:  7.5 mg every Tue; 5 mg all other days   Next INR check:  12/10/2021             Telephone call with Schuyler who verbalizes understanding and agrees to plan    Lab visit scheduled    Education provided: Vitamin K content of foods and Contact [phone]  with any changes, questions or concerns.     Plan made per [facility] anticoagulation protocol    Lyly Florian, RN  Anticoagulation Clinic  10/25/2021    _______________________________________________________________________     Anticoagulation Episode Summary     Current INR goal:  2.0-3.0   TTR:  94.0 % (10.5 mo)   Target end date:  Indefinite   Send INR reminders to:  [facility] [place]    Indications    Long-term (current) use of anticoagulants [Z79.01] (Resolved) [Z79.01]  Other acute pulmonary embolism without acute cor pulmonale (H) (Resolved) [I26.99]  Personal history of DVT (deep vein thrombosis) (Resolved) [Z86.718]  History of pulmonary embolism [Z86.711]  Long term current use of anticoagulants  with INR goal of 2.0-3.0 [Z79.01]           Comments:           Anticoagulation Care Providers     Provider Role Specialty Phone number    Rasheeda Juan MD Referring Family Medicine [phone]

## 2021-10-26 RX ORDER — ATENOLOL 50 MG/1
TABLET ORAL
Qty: 30 TABLET | Refills: 0 | Status: SHIPPED | OUTPATIENT
Start: 2021-10-26 | End: 2021-12-10

## 2021-10-26 RX ORDER — GLIPIZIDE 10 MG/1
TABLET, FILM COATED, EXTENDED RELEASE ORAL
Qty: 60 TABLET | Refills: 0 | Status: SHIPPED | OUTPATIENT
Start: 2021-10-26 | End: 2021-12-13

## 2021-10-26 RX ORDER — ROSUVASTATIN CALCIUM 40 MG/1
TABLET, COATED ORAL
Qty: 30 TABLET | Refills: 0 | Status: SHIPPED | OUTPATIENT
Start: 2021-10-26 | End: 2021-12-10

## 2021-11-30 DIAGNOSIS — E11.9 TYPE 2 DIABETES MELLITUS WITHOUT COMPLICATION, WITHOUT LONG-TERM CURRENT USE OF INSULIN (H): ICD-10-CM

## 2021-11-30 RX ORDER — GLIPIZIDE 10 MG/1
TABLET, FILM COATED, EXTENDED RELEASE ORAL
Qty: 60 TABLET | Refills: 0 | OUTPATIENT
Start: 2021-11-30

## 2021-11-30 NOTE — TELEPHONE ENCOUNTER
Routing refill request to provider for review/approval because:   Sulfonylurea Agents Failed 11/30/2021 11:34 AM   Protocol Details  Patient has a recent creatinine (normal) within the past 12 mos.        Creatinine   Date Value Ref Range Status   08/18/2021 1.40 (H) 0.66 - 1.25 mg/dL Final   04/24/2019 1.12 0.66 - 1.25 mg/dL Final     Alona Dockery RN

## 2021-12-06 DIAGNOSIS — Z86.718 PERSONAL HISTORY OF DVT (DEEP VEIN THROMBOSIS): ICD-10-CM

## 2021-12-06 DIAGNOSIS — Z86.711 HISTORY OF PULMONARY EMBOLISM: ICD-10-CM

## 2021-12-06 DIAGNOSIS — I26.99 OTHER ACUTE PULMONARY EMBOLISM WITHOUT ACUTE COR PULMONALE (H): ICD-10-CM

## 2021-12-06 DIAGNOSIS — Z79.01 LONG TERM CURRENT USE OF ANTICOAGULANTS WITH INR GOAL OF 2.0-3.0: ICD-10-CM

## 2021-12-06 RX ORDER — WARFARIN SODIUM 5 MG/1
TABLET ORAL
Qty: 94 TABLET | Refills: 0 | Status: SHIPPED | OUTPATIENT
Start: 2021-12-06 | End: 2022-03-07

## 2021-12-06 NOTE — TELEPHONE ENCOUNTER
Last INR: 10/25/21=2.3  Next INR: 12/10/21  INR referral and OV up-to-date: referral up-to-date, patient due to see PCP--note added to refill      Prescription approved per [facility] Refill Protocol.    Lyly Florian RN

## 2021-12-10 ENCOUNTER — LAB (OUTPATIENT)
Dept: LAB | Facility: CLINIC | Age: 56
End: 2021-12-10
Payer: COMMERCIAL

## 2021-12-10 ENCOUNTER — ANTICOAGULATION THERAPY VISIT (OUTPATIENT)
Dept: ANTICOAGULATION | Facility: CLINIC | Age: 56
End: 2021-12-10

## 2021-12-10 DIAGNOSIS — Z86.711 HISTORY OF PULMONARY EMBOLISM: Primary | ICD-10-CM

## 2021-12-10 DIAGNOSIS — Z79.01 LONG TERM CURRENT USE OF ANTICOAGULANTS WITH INR GOAL OF 2.0-3.0: ICD-10-CM

## 2021-12-10 DIAGNOSIS — Z86.718 PERSONAL HISTORY OF DVT (DEEP VEIN THROMBOSIS): ICD-10-CM

## 2021-12-10 DIAGNOSIS — Z86.711 HISTORY OF PULMONARY EMBOLISM: ICD-10-CM

## 2021-12-10 DIAGNOSIS — I10 HYPERTENSION GOAL BP (BLOOD PRESSURE) < 140/90: ICD-10-CM

## 2021-12-10 DIAGNOSIS — E11.9 TYPE 2 DIABETES MELLITUS WITHOUT COMPLICATIONS (H): ICD-10-CM

## 2021-12-10 DIAGNOSIS — E11.65 TYPE 2 DIABETES MELLITUS WITH HYPERGLYCEMIA, WITHOUT LONG-TERM CURRENT USE OF INSULIN (H): ICD-10-CM

## 2021-12-10 LAB — INR BLD: 2.4 (ref 0.9–1.1)

## 2021-12-10 PROCEDURE — 36416 COLLJ CAPILLARY BLOOD SPEC: CPT

## 2021-12-10 PROCEDURE — 85610 PROTHROMBIN TIME: CPT

## 2021-12-10 RX ORDER — ATENOLOL 50 MG/1
TABLET ORAL
Qty: 30 TABLET | Refills: 0 | Status: SHIPPED | OUTPATIENT
Start: 2021-12-10 | End: 2022-01-05

## 2021-12-10 RX ORDER — ROSUVASTATIN CALCIUM 40 MG/1
TABLET, COATED ORAL
Qty: 30 TABLET | Refills: 0 | Status: SHIPPED | OUTPATIENT
Start: 2021-12-10 | End: 2022-01-05

## 2021-12-10 NOTE — TELEPHONE ENCOUNTER
Last refill until visit  Prescription approved per [facility] Refill Protocol.  Sue Rubio RN, BSN  Message handled by CLINIC NURSE.

## 2021-12-10 NOTE — PROGRESS NOTES
ANTICOAGULATION MANAGEMENT     Schuyler Castro 56 year old male is on warfarin with therapeutic INR result. (Goal INR 2.0-3.0)    Recent labs: (last 7 days)     12/10/21  0744   INR 2.4*       ASSESSMENT     Source(s): Chart Review and Patient/Caregiver Call       Warfarin doses taken: Warfarin taken as instructed    Diet: No new diet changes identified    New illness, injury, or hospitalization: No    Medication/supplement changes: None noted--patient states he has enough Crestor to last until his 12/21 office visit.    Signs or symptoms of bleeding or clotting: No    Previous INR: Therapeutic last 2(+) visits    Additional findings: Pt scheduled office visit for 12/21/21 for DM check and refills     PLAN     Recommended plan for no diet, medication or health factor changes affecting INR     Dosing Instructions: Continue your current warfarin dose with next INR in 12 weeks       Summary  As of 12/10/2021    Full warfarin instructions:  7.5 mg every Tue; 5 mg all other days   Next INR check:  3/4/2022             Telephone call with Schuyler who verbalizes understanding and agrees to plan    Lab visit scheduled    Education provided: Contact [phone]  with any changes, questions or concerns.     Plan made per [facility] anticoagulation protocol    Lyly Florian, RN  Anticoagulation Clinic  12/10/2021    _______________________________________________________________________     Anticoagulation Episode Summary     Current INR goal:  2.0-3.0   TTR:  94.7 % (1 y)   Target end date:  Indefinite   Send INR reminders to:  [facility] [place]    Indications    Long-term (current) use of anticoagulants [Z79.01] (Resolved) [Z79.01]  Other acute pulmonary embolism without acute cor pulmonale (H) (Resolved) [I26.99]  Personal history of DVT (deep vein thrombosis) (Resolved) [Z86.718]  History of pulmonary embolism [Z86.711]  Long term current use of anticoagulants with INR goal of 2.0-3.0 [Z79.01]           Comments:  6/7/21-OK  for 12 week INR monitoring         Anticoagulation Care Providers     Provider Role Specialty Phone number    Rasheeda Juan MD Referring Family Medicine [phone]

## 2021-12-11 DIAGNOSIS — E11.9 TYPE 2 DIABETES MELLITUS WITHOUT COMPLICATIONS (H): ICD-10-CM

## 2021-12-13 RX ORDER — GLIPIZIDE 10 MG/1
TABLET, FILM COATED, EXTENDED RELEASE ORAL
Qty: 60 TABLET | Refills: 0 | Status: SHIPPED | OUTPATIENT
Start: 2021-12-13 | End: 2022-01-05

## 2021-12-13 NOTE — TELEPHONE ENCOUNTER
Routing refill request to provider for review/approval because:  Labs out of range:  A1C  Pt does have appt scheduled on 12/21/21  Anahi ZAVALETA RN, BSN

## 2022-01-04 DIAGNOSIS — I10 HYPERTENSION GOAL BP (BLOOD PRESSURE) < 140/90: ICD-10-CM

## 2022-01-04 DIAGNOSIS — E11.9 TYPE 2 DIABETES MELLITUS WITHOUT COMPLICATIONS (H): ICD-10-CM

## 2022-01-04 DIAGNOSIS — E11.65 TYPE 2 DIABETES MELLITUS WITH HYPERGLYCEMIA, WITHOUT LONG-TERM CURRENT USE OF INSULIN (H): ICD-10-CM

## 2022-01-05 DIAGNOSIS — I10 HYPERTENSION GOAL BP (BLOOD PRESSURE) < 140/90: ICD-10-CM

## 2022-01-05 DIAGNOSIS — E11.9 TYPE 2 DIABETES MELLITUS WITHOUT COMPLICATIONS (H): ICD-10-CM

## 2022-01-05 DIAGNOSIS — E11.65 TYPE 2 DIABETES MELLITUS WITH HYPERGLYCEMIA, WITHOUT LONG-TERM CURRENT USE OF INSULIN (H): ICD-10-CM

## 2022-01-05 RX ORDER — ATENOLOL 50 MG/1
TABLET ORAL
Qty: 30 TABLET | Refills: 0 | OUTPATIENT
Start: 2022-01-05

## 2022-01-05 RX ORDER — GLIPIZIDE 10 MG/1
TABLET, FILM COATED, EXTENDED RELEASE ORAL
Qty: 60 TABLET | Refills: 0 | Status: SHIPPED | OUTPATIENT
Start: 2022-01-05 | End: 2022-02-07

## 2022-01-05 RX ORDER — ROSUVASTATIN CALCIUM 40 MG/1
TABLET, COATED ORAL
Qty: 30 TABLET | Refills: 0 | OUTPATIENT
Start: 2022-01-05

## 2022-01-05 NOTE — TELEPHONE ENCOUNTER
Does not meet RN protocol requirements.  Routing refill request to provider for review/approval because:  La given x1. Patient has upcoming appointment in two weeks with Dr. Pearce.  Alona Dockery RN

## 2022-01-05 NOTE — TELEPHONE ENCOUNTER
Dr. Juan     Patient states that you are his primary care provider and he had been unable to schedule an appointment which is why he had seen different providers   He will schedule an appointment within the next 30 days (RN offered to schedule however patient states he needs to check his schedule)     Please send 30 day supply and patient will f/u     Thank you,   Thea Mckenzie, Registered Nurse  [facility]

## 2022-01-05 NOTE — TELEPHONE ENCOUNTER
I have never met with patient to discuss medications. This  refill request should go to his PCP for bridging refill.    Osiel Pearce MD

## 2022-01-05 NOTE — TELEPHONE ENCOUNTER
Routing refill request to provider for review/approval because:  La given x1 and patient did not follow up, please advise    Stephen FISHER RN

## 2022-01-05 NOTE — TELEPHONE ENCOUNTER
this is very confusing, it looks like the patient is seeing multiple provider.  Please call and find out who will the patient be following up with, and I hope this will be a permanent as it's not good care to move from one provider to another, and does he have an appointment, then from there we can give refills on medicine only until that time (1 month or so)  Rasheeda Juan MD  [facility]  [phone]

## 2022-01-05 NOTE — TELEPHONE ENCOUNTER
Last refill until visit  Prescription approved per [facility] Refill Protocol.  Sue Rubio RN, BSN  [facility]

## 2022-01-06 RX ORDER — ROSUVASTATIN CALCIUM 40 MG/1
40 TABLET, COATED ORAL DAILY
Qty: 30 TABLET | Refills: 0 | Status: SHIPPED | OUTPATIENT
Start: 2022-01-06 | End: 2022-02-07

## 2022-01-06 RX ORDER — ATENOLOL 50 MG/1
50 TABLET ORAL DAILY
Qty: 30 TABLET | Refills: 0 | Status: SHIPPED | OUTPATIENT
Start: 2022-01-06 | End: 2022-02-07

## 2022-01-06 NOTE — TELEPHONE ENCOUNTER
Patient calling to check status of this refill     He has set up an appt with Dr. Juan for 2/25/2022     RN advised we did receive refill request and nurses will work to process - this can take up to 72 hours, patient is not out of medications at this time     Thea Mckenzie, Registered Nurse  [facility]

## 2022-01-07 DIAGNOSIS — E11.65 TYPE 2 DIABETES MELLITUS WITH HYPERGLYCEMIA, WITHOUT LONG-TERM CURRENT USE OF INSULIN (H): ICD-10-CM

## 2022-01-09 ENCOUNTER — HEALTH MAINTENANCE LETTER (OUTPATIENT)
Age: 57
End: 2022-01-09

## 2022-01-10 RX ORDER — ATENOLOL 50 MG/1
TABLET ORAL
Qty: 30 TABLET | Refills: 0 | OUTPATIENT
Start: 2022-01-10

## 2022-01-10 RX ORDER — ROSUVASTATIN CALCIUM 40 MG/1
TABLET, COATED ORAL
Qty: 30 TABLET | Refills: 0 | OUTPATIENT
Start: 2022-01-10

## 2022-01-10 RX ORDER — LISINOPRIL AND HYDROCHLOROTHIAZIDE 12.5; 2 MG/1; MG/1
TABLET ORAL
Qty: 180 TABLET | Refills: 0 | OUTPATIENT
Start: 2022-01-10

## 2022-01-11 RX ORDER — ORAL SEMAGLUTIDE 14 MG/1
1 TABLET ORAL DAILY
Qty: 90 TABLET | Refills: 1 | OUTPATIENT
Start: 2022-01-11

## 2022-02-04 DIAGNOSIS — I10 HYPERTENSION GOAL BP (BLOOD PRESSURE) < 140/90: ICD-10-CM

## 2022-02-04 DIAGNOSIS — E11.65 TYPE 2 DIABETES MELLITUS WITH HYPERGLYCEMIA, WITHOUT LONG-TERM CURRENT USE OF INSULIN (H): ICD-10-CM

## 2022-02-04 DIAGNOSIS — E11.9 TYPE 2 DIABETES MELLITUS WITHOUT COMPLICATIONS (H): ICD-10-CM

## 2022-02-07 RX ORDER — ATENOLOL 50 MG/1
TABLET ORAL
Qty: 30 TABLET | Refills: 0 | Status: SHIPPED | OUTPATIENT
Start: 2022-02-07 | End: 2022-03-08

## 2022-02-07 RX ORDER — GLIPIZIDE 10 MG/1
TABLET, FILM COATED, EXTENDED RELEASE ORAL
Qty: 60 TABLET | Refills: 0 | Status: SHIPPED | OUTPATIENT
Start: 2022-02-07 | End: 2022-03-08

## 2022-02-07 RX ORDER — ROSUVASTATIN CALCIUM 40 MG/1
TABLET, COATED ORAL
Qty: 30 TABLET | Refills: 0 | Status: SHIPPED | OUTPATIENT
Start: 2022-02-07 | End: 2022-03-08

## 2022-02-07 NOTE — TELEPHONE ENCOUNTER
Routing refill request to provider for review/approval because:   Sulfonylurea Agents Failed 02/04/2022 09:30 AM   Protocol Details  Patient has documented A1c within the specified period of time.    Patient has a recent creatinine (normal) within the past 12 mos.        Lab Results   Component Value Date    A1C 10.1 08/18/2021    A1C 9.5 04/24/2019    A1C 8.9 05/24/2018    A1C 6.8 01/23/2017    A1C 6.3 11/03/2015    A1C 6.4 10/24/2014     Creatinine   Date Value Ref Range Status   08/18/2021 1.40 (H) 0.66 - 1.25 mg/dL Final   04/24/2019 1.12 0.66 - 1.25 mg/dL Final       Alona Dockery RN

## 2022-02-07 NOTE — TELEPHONE ENCOUNTER
Routing refill request to provider for review/approval because:  BP Readings from Last 3 Encounters:   09/11/21 (!) 140/90   12/12/20 (!) 156/84   04/24/19 132/82      Pt has appt scheduled in 2 weeks    Anette Magana RN

## 2022-02-09 DIAGNOSIS — I10 HYPERTENSION GOAL BP (BLOOD PRESSURE) < 140/90: ICD-10-CM

## 2022-02-09 DIAGNOSIS — E11.65 TYPE 2 DIABETES MELLITUS WITH HYPERGLYCEMIA, WITHOUT LONG-TERM CURRENT USE OF INSULIN (H): ICD-10-CM

## 2022-02-10 RX ORDER — LISINOPRIL AND HYDROCHLOROTHIAZIDE 12.5; 2 MG/1; MG/1
TABLET ORAL
Qty: 180 TABLET | Refills: 0 | Status: SHIPPED | OUTPATIENT
Start: 2022-02-10 | End: 2022-06-06

## 2022-02-10 NOTE — TELEPHONE ENCOUNTER
Routing refill request to provider for review/approval because:  Labs not current:  creatinine  A break in medication  Failing bp    Nancy Medina RN

## 2022-02-10 NOTE — TELEPHONE ENCOUNTER
Routing refill request to provider for review/approval because:  Labs not current:  A1C    Nancy Medina RN

## 2022-02-17 PROBLEM — I26.99 OTHER ACUTE PULMONARY EMBOLISM WITHOUT ACUTE COR PULMONALE (H): Status: RESOLVED | Noted: 2017-01-23 | Resolved: 2021-08-18

## 2022-03-06 ENCOUNTER — HEALTH MAINTENANCE LETTER (OUTPATIENT)
Age: 57
End: 2022-03-06

## 2022-03-06 DIAGNOSIS — Z79.01 LONG TERM CURRENT USE OF ANTICOAGULANTS WITH INR GOAL OF 2.0-3.0: ICD-10-CM

## 2022-03-06 DIAGNOSIS — Z86.711 HISTORY OF PULMONARY EMBOLISM: ICD-10-CM

## 2022-03-06 DIAGNOSIS — Z86.718 PERSONAL HISTORY OF DVT (DEEP VEIN THROMBOSIS): ICD-10-CM

## 2022-03-06 DIAGNOSIS — I26.99 OTHER ACUTE PULMONARY EMBOLISM WITHOUT ACUTE COR PULMONALE (H): ICD-10-CM

## 2022-03-07 ENCOUNTER — DOCUMENTATION ONLY (OUTPATIENT)
Dept: ANTICOAGULATION | Facility: CLINIC | Age: 57
End: 2022-03-07
Payer: COMMERCIAL

## 2022-03-07 DIAGNOSIS — I10 HYPERTENSION GOAL BP (BLOOD PRESSURE) < 140/90: ICD-10-CM

## 2022-03-07 DIAGNOSIS — Z79.01 LONG TERM CURRENT USE OF ANTICOAGULANTS WITH INR GOAL OF 2.0-3.0: ICD-10-CM

## 2022-03-07 DIAGNOSIS — Z86.711 HISTORY OF PULMONARY EMBOLISM: Primary | ICD-10-CM

## 2022-03-07 DIAGNOSIS — Z86.718 PERSONAL HISTORY OF DVT (DEEP VEIN THROMBOSIS): ICD-10-CM

## 2022-03-07 DIAGNOSIS — E11.65 TYPE 2 DIABETES MELLITUS WITH HYPERGLYCEMIA, WITHOUT LONG-TERM CURRENT USE OF INSULIN (H): ICD-10-CM

## 2022-03-07 DIAGNOSIS — E11.9 TYPE 2 DIABETES MELLITUS WITHOUT COMPLICATIONS (H): ICD-10-CM

## 2022-03-07 RX ORDER — WARFARIN SODIUM 5 MG/1
TABLET ORAL
Qty: 94 TABLET | Refills: 1 | Status: SHIPPED | OUTPATIENT
Start: 2022-03-07 | End: 2022-06-06

## 2022-03-07 NOTE — PROGRESS NOTES
ANTICOAGULATION MANAGEMENT      Schuyler Castro due for annual renewal of referral to anticoagulation monitoring. Order pended for your review and signature.      ANTICOAGULATION SUMMARY      Warfarin indication(s)     DVT, PE    Heart valve present?  NO       Current goal range   INR: 2.0-3.0     Goal appropriate for indication? Yes, INR 2-3 appropriate for hx of DVT, PE, hypercoagulable state, Afib, LVAD, or bileaflet AVR without risk factors     Current duration of therapy Indefinite/long term therapy   Time in Therapeutic Range (TTR)  (Goal > 60%) 95%       Office visit with referring provider's group within last year yes on 08/18/2021       Lyly Florian RN

## 2022-03-07 NOTE — TELEPHONE ENCOUNTER
Last INR: 12/10/21=2.4  Next INR: 3/9/22  INR referral and OV up-to-date: updated [facility] referral today and routed to PCP      Prescription approved per [facility] Refill Protocol.    Lyly Florian RN

## 2022-03-08 RX ORDER — ATENOLOL 50 MG/1
TABLET ORAL
Qty: 30 TABLET | Refills: 0 | Status: SHIPPED | OUTPATIENT
Start: 2022-03-08 | End: 2022-04-05

## 2022-03-08 RX ORDER — ROSUVASTATIN CALCIUM 40 MG/1
TABLET, COATED ORAL
Qty: 30 TABLET | Refills: 0 | Status: SHIPPED | OUTPATIENT
Start: 2022-03-08 | End: 2022-04-05

## 2022-03-08 RX ORDER — GLIPIZIDE 10 MG/1
TABLET, FILM COATED, EXTENDED RELEASE ORAL
Qty: 60 TABLET | Refills: 0 | Status: SHIPPED | OUTPATIENT
Start: 2022-03-08 | End: 2022-04-05

## 2022-03-08 NOTE — TELEPHONE ENCOUNTER
Prescription approved per [facility] Refill Protocol.  Lindsey Coleman, RN, BSN, PHN  [facility]

## 2022-03-08 NOTE — TELEPHONE ENCOUNTER
Prescription approved per [facility] Refill Protocol.  Lindsey Coleman, RN, BSN, PHN  [facility]

## 2022-03-09 ENCOUNTER — LAB (OUTPATIENT)
Dept: LAB | Facility: CLINIC | Age: 57
End: 2022-03-09
Payer: COMMERCIAL

## 2022-03-09 ENCOUNTER — ANTICOAGULATION THERAPY VISIT (OUTPATIENT)
Dept: ANTICOAGULATION | Facility: CLINIC | Age: 57
End: 2022-03-09

## 2022-03-09 DIAGNOSIS — Z86.711 HISTORY OF PULMONARY EMBOLISM: ICD-10-CM

## 2022-03-09 DIAGNOSIS — Z79.01 LONG TERM CURRENT USE OF ANTICOAGULANTS WITH INR GOAL OF 2.0-3.0: ICD-10-CM

## 2022-03-09 DIAGNOSIS — Z86.718 PERSONAL HISTORY OF DVT (DEEP VEIN THROMBOSIS): ICD-10-CM

## 2022-03-09 DIAGNOSIS — Z86.711 HISTORY OF PULMONARY EMBOLISM: Primary | ICD-10-CM

## 2022-03-09 DIAGNOSIS — E11.65 TYPE 2 DIABETES MELLITUS WITH HYPERGLYCEMIA (H): Primary | ICD-10-CM

## 2022-03-09 LAB — INR BLD: 1.5 (ref 0.9–1.1)

## 2022-03-09 PROCEDURE — 36416 COLLJ CAPILLARY BLOOD SPEC: CPT

## 2022-03-09 PROCEDURE — 85610 PROTHROMBIN TIME: CPT

## 2022-03-09 NOTE — PROGRESS NOTES
"ANTICOAGULATION MANAGEMENT     Schuyler Castro 56 year old male is on warfarin with subtherapeutic INR result. (Goal INR 2.0-3.0)    Recent labs: (last 7 days)     03/09/22  0724   INR 1.5*       ASSESSMENT       Source(s): Chart Review and Patient/Caregiver Call       Warfarin doses taken: Warfarin taken as instructed. Denies any missed doses    Diet: Increased greens/vitamin K in diet; ongoing change. He is working on losing weight, so eating healthier. \"My wife has been sneaking spinach into our food\"    New illness, injury, or hospitalization: No    Medication/supplement changes: None noted    Signs or symptoms of bleeding or clotting: No    Previous INR: Therapeutic last 2(+) visits    Additional findings: He started a new exercise program.  Patient denies any other identifiable changes that caused the INR to be out of therapeutic range.       PLAN     Recommended plan for ongoing change(s) affecting INR     Dosing Instructions: Booster dose then Increase your warfarin dose (6.7% change) with next INR in 2 weeks       Summary  As of 3/9/2022    Full warfarin instructions:  7.5 mg every Tue, Fri; 5 mg all other days   Next INR check:  3/25/2022             Telephone call with  David who agrees to plan and repeated back plan correctly    Lab visit scheduled    Education provided: Importance of consistent vitamin K intake, Impact of vitamin K foods on INR, Potential interaction between warfarin and exercise and Monitoring for clotting signs and symptoms    Plan made per [facility] anticoagulation protocol    Halle Woodruff RN  Anticoagulation Clinic  3/9/2022    _______________________________________________________________________     Anticoagulation Episode Summary     Current INR goal:  2.0-3.0   TTR:  86.5 % (1 y)   Target end date:  Indefinite   Send INR reminders to:  [facility] [place]    Indications    Personal history of DVT (deep vein thrombosis) (Resolved) [Z86.718]  History of pulmonary embolism " [Z86.711]  Long term current use of anticoagulants with INR goal of 2.0-3.0 [Z79.01]  Personal history of DVT (deep vein thrombosis) [Z86.718]           Comments:  6/7/21-OK for 12 week INR monitoring         Anticoagulation Care Providers     Provider Role Specialty Phone number    Rasheeda Juan MD Referring Family Medicine [phone]

## 2022-03-25 ENCOUNTER — LAB (OUTPATIENT)
Dept: LAB | Facility: CLINIC | Age: 57
End: 2022-03-25
Payer: COMMERCIAL

## 2022-03-25 ENCOUNTER — ANTICOAGULATION THERAPY VISIT (OUTPATIENT)
Dept: ANTICOAGULATION | Facility: CLINIC | Age: 57
End: 2022-03-25

## 2022-03-25 DIAGNOSIS — Z79.01 LONG TERM CURRENT USE OF ANTICOAGULANTS WITH INR GOAL OF 2.0-3.0: ICD-10-CM

## 2022-03-25 DIAGNOSIS — Z86.718 PERSONAL HISTORY OF DVT (DEEP VEIN THROMBOSIS): ICD-10-CM

## 2022-03-25 DIAGNOSIS — Z86.711 HISTORY OF PULMONARY EMBOLISM: Primary | ICD-10-CM

## 2022-03-25 DIAGNOSIS — Z86.711 HISTORY OF PULMONARY EMBOLISM: ICD-10-CM

## 2022-03-25 LAB — INR BLD: 2.3 (ref 0.9–1.1)

## 2022-03-25 PROCEDURE — 85610 PROTHROMBIN TIME: CPT

## 2022-03-25 PROCEDURE — 36415 COLL VENOUS BLD VENIPUNCTURE: CPT

## 2022-03-25 NOTE — PROGRESS NOTES
ANTICOAGULATION MANAGEMENT     Schuyler Castro 56 year old male is on warfarin with therapeutic INR result. (Goal INR 2.0-3.0)    Recent labs: (last 7 days)     03/25/22  0713   INR 2.3*       ASSESSMENT       Source(s): Chart Review and Patient/Caregiver Call       Warfarin doses taken: Warfarin taken as instructed    Diet: No new diet changes identified    New illness, injury, or hospitalization: No    Medication/supplement changes: None noted    Signs or symptoms of bleeding or clotting: No    Previous INR: Subtherapeutic    Additional findings: None       PLAN     Recommended plan for no diet, medication or health factor changes affecting INR     Dosing Instructions: Continue your current warfarin dose with next INR in 6 weeks       Summary  As of 3/25/2022    Full warfarin instructions:  7.5 mg every Tue, Fri; 5 mg all other days   Next INR check:  5/6/2022             Telephone call with Schuyler who agrees to plan and repeated back plan correctly    Lab visit scheduled    Education provided: Please call back if any changes to your diet, medications or how you've been taking warfarin    Plan made per [facility] anticoagulation protocol    Violeta Sanchez RN  Anticoagulation Clinic  3/25/2022    _______________________________________________________________________     Anticoagulation Episode Summary     Current INR goal:  2.0-3.0   TTR:  83.7 % (1 y)   Target end date:  Indefinite   Send INR reminders to:  [facility]    Indications    Personal history of DVT (deep vein thrombosis) (Resolved) [Z86.718]  History of pulmonary embolism [Z86.711]  Long term current use of anticoagulants with INR goal of 2.0-3.0 [Z79.01]  Personal history of DVT (deep vein thrombosis) [Z86.718]           Comments:  6/7/21-OK for 12 week INR monitoring         Anticoagulation Care Providers     Provider Role Specialty Phone number    Rasheeda Juan MD Referring Family Medicine [phone]

## 2022-04-02 DIAGNOSIS — E11.9 TYPE 2 DIABETES MELLITUS WITHOUT COMPLICATIONS (H): ICD-10-CM

## 2022-04-02 DIAGNOSIS — I10 HYPERTENSION GOAL BP (BLOOD PRESSURE) < 140/90: ICD-10-CM

## 2022-04-02 DIAGNOSIS — E11.65 TYPE 2 DIABETES MELLITUS WITH HYPERGLYCEMIA, WITHOUT LONG-TERM CURRENT USE OF INSULIN (H): ICD-10-CM

## 2022-04-02 NOTE — LETTER
Schuyler MOSS Castro  [address] [place] [id number]    Thank you for choosing [facility] today for your health care needs.     [facility] is transforming primary care  At [facility], we re dedicated to constantly improve how we serve the health care needs of our patients and communities. We re currently making changes to the way we deliver care.     Changes you ll notice include:    An emphasis on building a relationship with a primary care provider    Access to a PAL (patient advocate and liaison) to help guide you with your care needs    Appointment lengths tailored to your specific needs and greater access to a care team to help you and your provider improve and maintain your health and well-being    Improved online access to your care team    Benefits of a primary care provider  If you don t have a designated primary care provider, we encourage you to get to know our care team online and find a provider you d like to see. Most of our providers have a short video on their online provider page. Visit [email or web address] to explore our providers and locations.    Benefits of having a primary care provider include:      They get to know you - your health history, family history and goals, making it easier to make a health plan together.     You get to know them - making health-related conversations and decisions easier      Primary care doctors help you when you re sick or hurt - but also focus on keeping you healthy with preventive care and screenings.      A doctor who sees you regularly is more likely to notice changes in your health.     You ll be connected to a broad care team who partners with your provider to support you.    Patient Advocate Liaison (PAL)   To help make sure you get the right care, at the right time, we include PALs, or Patient Advocate Liaisons, as part of your care team. Your PAL will be your first line of contact. They ll advocate for your needs and help you navigate  our services, connecting you with care team members and community resources to ensure your care is well coordinated. You ll be introduced to a PAL in an upcoming visit.     Expanded care team access with tailored appointment lengths  Depending on your health care needs, you may have longer or shorter appointments and see additional care team providers - including Medication Therapy Management (MTM) pharmacists, diabetes educators, behavioral health clinicians, or social workers. At times, they may be included in your visit with your provider, or you may see them individually.     Online access to your health care records and care team  [employer] is our online tool that makes it easy to see your health care information and communicate with your care team.     [employer] allows you to:     View your health maintenance plan so you know when you re due for a preventive screening    Send secure messages to your care team    View your health history and visit summaries     Schedule appointments     Complete questionnaires and eCheck-in before appointments      Get care from your provider with an e-visit      View and pay your bill     Sign up at [email or web address]. Once you have an account, you also can download the mobile kareem.     Connecting to fast and convenient care  When you need fast, convenient care - consider one of the following options:       Video Visit: A convenient care option for visiting with your provider out of the comfort of your own home. Most of the things you come to the clinic to address with your provider can now be done virtually through a video. This includes your chronic medication follow up, questions or concerns you may have, and even your annual Medicare Wellness Visit.       Phone Visit: Another convenient option for follow up of common problems that may require a more in-depth discussion with your provider.       E-visit: When you need acute care quickly, or have a quick question about your  medication, an E-visit is completed through [employer] and your provider will respond within one business day.      Thea Mckenzie, Registered Nurse, PAL (Patient Advocate Liason)   [facility]   [phone]

## 2022-04-04 NOTE — TELEPHONE ENCOUNTER
Routing refill request to provider for review/approval because:  Beta-Blockers Protocol Failed        Blood pressure under 140/90 in past 12 months        BP Readings from Last 3 Encounters:   09/11/21 (!) 140/90   12/12/20 (!) 156/84   04/24/19 132/82   Nita Diggs RN on 4/4/2022 at 12:08 PM

## 2022-04-05 DIAGNOSIS — E11.65 TYPE 2 DIABETES MELLITUS WITH HYPERGLYCEMIA, WITHOUT LONG-TERM CURRENT USE OF INSULIN (H): ICD-10-CM

## 2022-04-05 RX ORDER — ROSUVASTATIN CALCIUM 40 MG/1
TABLET, COATED ORAL
Qty: 30 TABLET | Refills: 0 | Status: SHIPPED | OUTPATIENT
Start: 2022-04-05 | End: 2022-05-05

## 2022-04-05 RX ORDER — GLIPIZIDE 10 MG/1
TABLET, FILM COATED, EXTENDED RELEASE ORAL
Qty: 60 TABLET | Refills: 0 | Status: SHIPPED | OUTPATIENT
Start: 2022-04-05 | End: 2022-05-05

## 2022-04-05 RX ORDER — ATENOLOL 50 MG/1
TABLET ORAL
Qty: 30 TABLET | Refills: 0 | Status: SHIPPED | OUTPATIENT
Start: 2022-04-05 | End: 2022-05-05

## 2022-04-05 NOTE — TELEPHONE ENCOUNTER
Dr. Juan     Patient has an appt scheduled on 4/21/2022 with you     Please approve if okay for 1 month as visit scheduled     Thank you,   Thea Mckenzie, Registered Nurse, PAL (Patient Advocate Liason)   [facility]   [phone]

## 2022-04-06 RX ORDER — ORAL SEMAGLUTIDE 14 MG/1
1 TABLET ORAL DAILY
Qty: 90 TABLET | Refills: 1 | Status: SHIPPED | OUTPATIENT
Start: 2022-04-06 | End: 2022-10-04

## 2022-04-06 NOTE — TELEPHONE ENCOUNTER
Dr. Juan   Please advise on what the confusion is prior to contacting patient in order to review correct concern      The medication refill requested, is on med list     Thea Mckenzie, Registered Nurse, PAL (Patient Advocate Liason)   [facility]   [phone]

## 2022-04-06 NOTE — TELEPHONE ENCOUNTER
Routing refill request to provider for review/approval because:  Labs out of range:  Creatinine  Labs not current:  A1C  Pt has upcoming visit with Dr. Juan on 4/21/22  Creatinine   Date Value Ref Range Status   08/18/2021 1.40 (H) 0.66 - 1.25 mg/dL Final   04/24/2019 1.12 0.66 - 1.25 mg/dL Final     Hemoglobin A1C POCT   Date Value Ref Range Status   04/24/2019 9.5 (H) 0 - 5.6 % Final     Comment:     Results confirmed by repeat test  Normal <5.7% Prediabetes 5.7-6.4%  Diabetes 6.5% or higher - adopted from ADA   consensus guidelines.       Hemoglobin A1C   Date Value Ref Range Status   08/18/2021 10.1 (H) 0.0 - 5.6 % Final     Comment:     Normal <5.7%   Prediabetes 5.7-6.4%    Diabetes 6.5% or higher     Note: Adopted from ADA consensus guidelines.     Lindsey Coleman, RN, BSN, PHN  [facility]

## 2022-05-03 DIAGNOSIS — I10 HYPERTENSION GOAL BP (BLOOD PRESSURE) < 140/90: ICD-10-CM

## 2022-05-03 DIAGNOSIS — E11.65 TYPE 2 DIABETES MELLITUS WITH HYPERGLYCEMIA, WITHOUT LONG-TERM CURRENT USE OF INSULIN (H): ICD-10-CM

## 2022-05-04 NOTE — TELEPHONE ENCOUNTER
Routing refill request to provider for review/approval because:  A1c elevated  Creatinine elevated  BP elevated    Lab Results   Component Value Date    A1C 10.1 08/18/2021    A1C 9.5 04/24/2019    A1C 8.9 05/24/2018    A1C 6.8 01/23/2017    A1C 6.3 11/03/2015    A1C 6.4 10/24/2014     Creatinine   Date Value Ref Range Status   08/18/2021 1.40 (H) 0.66 - 1.25 mg/dL Final   04/24/2019 1.12 0.66 - 1.25 mg/dL Final     BP Readings from Last 3 Encounters:   09/11/21 (!) 140/90   12/12/20 (!) 156/84   04/24/19 132/82     Meghan Rocha RN

## 2022-05-05 NOTE — TELEPHONE ENCOUNTER
I did that multiple times. And he still did not show.  So I'm not sure what's the best thing to do?

## 2022-05-05 NOTE — TELEPHONE ENCOUNTER
Message left for patient to return call to this RN PAL - please transfer if available     Direct number left for this RN PAL on message for return call     Thea Mckenzie Registered Nurse PAL (patient advocate liaison)   [facility] [phone]

## 2022-05-05 NOTE — TELEPHONE ENCOUNTER
Dr. Juan     Patient has an appt scheduled with Dr. Bui on 5/23     Would you like to fill until the visit? RN t'd up only enough tablets until the visit     RN will f/u with the patient to advise of recommendations     Thea Mckenzie, Registered Nurse, PAL (Patient Advocate Liason)   [facility]   [phone]

## 2022-05-06 ENCOUNTER — LAB (OUTPATIENT)
Dept: LAB | Facility: CLINIC | Age: 57
End: 2022-05-06
Payer: COMMERCIAL

## 2022-05-06 ENCOUNTER — ANTICOAGULATION THERAPY VISIT (OUTPATIENT)
Dept: ANTICOAGULATION | Facility: CLINIC | Age: 57
End: 2022-05-06

## 2022-05-06 DIAGNOSIS — Z86.711 HISTORY OF PULMONARY EMBOLISM: ICD-10-CM

## 2022-05-06 DIAGNOSIS — Z86.718 PERSONAL HISTORY OF DVT (DEEP VEIN THROMBOSIS): ICD-10-CM

## 2022-05-06 DIAGNOSIS — Z79.01 LONG TERM CURRENT USE OF ANTICOAGULANTS WITH INR GOAL OF 2.0-3.0: ICD-10-CM

## 2022-05-06 DIAGNOSIS — Z86.711 HISTORY OF PULMONARY EMBOLISM: Primary | ICD-10-CM

## 2022-05-06 LAB — INR BLD: 2 (ref 0.9–1.1)

## 2022-05-06 PROCEDURE — 85610 PROTHROMBIN TIME: CPT

## 2022-05-06 PROCEDURE — 36416 COLLJ CAPILLARY BLOOD SPEC: CPT

## 2022-05-06 NOTE — TELEPHONE ENCOUNTER
Message left for patient to return call to this RN PAL - please transfer if available     Direct number left for this RN PAL on message for return call     Thea Mckenzie Registered Nurse PAL (patient advocate liaison)   [facility] [phone]

## 2022-05-06 NOTE — PROGRESS NOTES
ANTICOAGULATION MANAGEMENT     Schuyler Castro 56 year old male is on warfarin with therapeutic INR result. (Goal INR 2.0-3.0)    Recent labs: (last 7 days)     05/06/22  0720   INR 2.0*       ASSESSMENT       Source(s): Chart Review and Patient/Caregiver Call       Warfarin doses taken: Warfarin taken as instructed    Diet: No new diet changes identified    New illness, injury, or hospitalization: No    Medication/supplement changes: None noted    Signs or symptoms of bleeding or clotting: No    Previous INR: Therapeutic last visit; previously outside of goal range    Additional findings: Has appointment for a physical scheduled on 5/23/22 (last one was 2019)       PLAN     Recommended plan for no diet, medication or health factor changes affecting INR     Dosing Instructions: continue your current warfarin dose with next INR in 8 weeks       Summary  As of 5/6/2022    Full warfarin instructions:  7.5 mg every Tue, Fri; 5 mg all other days   Next INR check:  7/1/2022             Telephone call with David who agrees to plan and repeated back plan correctly    Lab visit scheduled    Education provided: Please call back if any changes to your diet, medications or how you've been taking warfarin    Plan made per [facility] anticoagulation protocol    Violeta Sanchez, RN  Anticoagulation Clinic  5/6/2022    _______________________________________________________________________     Anticoagulation Episode Summary     Current INR goal:  2.0-3.0   TTR:  83.7 % (1 y)   Target end date:  Indefinite   Send INR reminders to:  [facility]    Indications    Personal history of DVT (deep vein thrombosis) (Resolved) [Z86.718]  History of pulmonary embolism [Z86.711]  Long term current use of anticoagulants with INR goal of 2.0-3.0 [Z79.01]  Personal history of DVT (deep vein thrombosis) [Z86.718]           Comments:           Anticoagulation Care Providers     Provider Role Specialty Phone number    Rasheeda Juan MD Referring  Family Medicine [phone]

## 2022-05-09 RX ORDER — GLIPIZIDE 10 MG/1
TABLET, FILM COATED, EXTENDED RELEASE ORAL
Qty: 28 TABLET | Refills: 0 | Status: SHIPPED | OUTPATIENT
Start: 2022-05-09 | End: 2022-06-06

## 2022-05-09 RX ORDER — ROSUVASTATIN CALCIUM 40 MG/1
TABLET, COATED ORAL
Qty: 14 TABLET | Refills: 0 | Status: SHIPPED | OUTPATIENT
Start: 2022-05-09 | End: 2022-06-06

## 2022-05-09 RX ORDER — ATENOLOL 50 MG/1
TABLET ORAL
Qty: 14 TABLET | Refills: 0 | Status: SHIPPED | OUTPATIENT
Start: 2022-05-09 | End: 2022-06-06

## 2022-05-09 NOTE — TELEPHONE ENCOUNTER
Dr. Juan     RN has not been able to reach patient     Please either approve or deny these medications - RN updated quantity to 14 days worth as appt is on 5/23/2022     My chart message sent to patient     3rd voicemail left for patient to return call     Thea Mckenzie Registered Nurse, PAL (Patient Advocate Liason)   [facility]   [phone]

## 2022-06-06 ENCOUNTER — OFFICE VISIT (OUTPATIENT)
Dept: FAMILY MEDICINE | Facility: CLINIC | Age: 57
End: 2022-06-06
Payer: COMMERCIAL

## 2022-06-06 VITALS
DIASTOLIC BLOOD PRESSURE: 76 MMHG | RESPIRATION RATE: 16 BRPM | WEIGHT: 269 LBS | HEIGHT: 71 IN | SYSTOLIC BLOOD PRESSURE: 132 MMHG | OXYGEN SATURATION: 98 % | TEMPERATURE: 99.1 F | BODY MASS INDEX: 37.66 KG/M2 | HEART RATE: 96 BPM

## 2022-06-06 DIAGNOSIS — I26.99 OTHER ACUTE PULMONARY EMBOLISM WITHOUT ACUTE COR PULMONALE (H): ICD-10-CM

## 2022-06-06 DIAGNOSIS — E11.65 TYPE 2 DIABETES MELLITUS WITH HYPERGLYCEMIA, WITHOUT LONG-TERM CURRENT USE OF INSULIN (H): ICD-10-CM

## 2022-06-06 DIAGNOSIS — Z86.718 PERSONAL HISTORY OF DVT (DEEP VEIN THROMBOSIS): ICD-10-CM

## 2022-06-06 DIAGNOSIS — Z00.00 ROUTINE GENERAL MEDICAL EXAMINATION AT A HEALTH CARE FACILITY: Primary | ICD-10-CM

## 2022-06-06 DIAGNOSIS — Z12.11 SCREEN FOR COLON CANCER: ICD-10-CM

## 2022-06-06 DIAGNOSIS — E78.5 HYPERLIPIDEMIA LDL GOAL <100: ICD-10-CM

## 2022-06-06 DIAGNOSIS — E66.01 MORBID OBESITY (H): ICD-10-CM

## 2022-06-06 DIAGNOSIS — I10 HYPERTENSION GOAL BP (BLOOD PRESSURE) < 140/90: ICD-10-CM

## 2022-06-06 DIAGNOSIS — Z86.711 HISTORY OF PULMONARY EMBOLISM: ICD-10-CM

## 2022-06-06 DIAGNOSIS — Z79.01 LONG TERM CURRENT USE OF ANTICOAGULANTS WITH INR GOAL OF 2.0-3.0: ICD-10-CM

## 2022-06-06 LAB
ALBUMIN SERPL-MCNC: 4 G/DL (ref 3.4–5)
ALP SERPL-CCNC: 58 U/L (ref 40–150)
ALT SERPL W P-5'-P-CCNC: 63 U/L (ref 0–70)
ANION GAP SERPL CALCULATED.3IONS-SCNC: 5 MMOL/L (ref 3–14)
AST SERPL W P-5'-P-CCNC: 35 U/L (ref 0–45)
BILIRUB SERPL-MCNC: 0.5 MG/DL (ref 0.2–1.3)
BUN SERPL-MCNC: 31 MG/DL (ref 7–30)
CALCIUM SERPL-MCNC: 9.6 MG/DL (ref 8.5–10.1)
CHLORIDE BLD-SCNC: 105 MMOL/L (ref 94–109)
CHOLEST SERPL-MCNC: 94 MG/DL
CO2 SERPL-SCNC: 26 MMOL/L (ref 20–32)
CREAT SERPL-MCNC: 1.36 MG/DL (ref 0.66–1.25)
CREAT UR-MCNC: 244 MG/DL
FASTING STATUS PATIENT QL REPORTED: YES
GFR SERPL CREATININE-BSD FRML MDRD: 62 ML/MIN/1.73M2
GLUCOSE BLD-MCNC: 269 MG/DL (ref 70–99)
HBA1C MFR BLD: 10.5 % (ref 0–5.6)
HDLC SERPL-MCNC: 40 MG/DL
LDLC SERPL CALC-MCNC: 24 MG/DL
MICROALBUMIN UR-MCNC: 207 MG/L
MICROALBUMIN/CREAT UR: 84.84 MG/G CR (ref 0–17)
NONHDLC SERPL-MCNC: 54 MG/DL
POTASSIUM BLD-SCNC: 4.8 MMOL/L (ref 3.4–5.3)
PROT SERPL-MCNC: 8.2 G/DL (ref 6.8–8.8)
SODIUM SERPL-SCNC: 136 MMOL/L (ref 133–144)
TRIGL SERPL-MCNC: 151 MG/DL

## 2022-06-06 PROCEDURE — 36415 COLL VENOUS BLD VENIPUNCTURE: CPT | Performed by: NURSE PRACTITIONER

## 2022-06-06 PROCEDURE — 80061 LIPID PANEL: CPT | Performed by: NURSE PRACTITIONER

## 2022-06-06 PROCEDURE — 83036 HEMOGLOBIN GLYCOSYLATED A1C: CPT | Performed by: NURSE PRACTITIONER

## 2022-06-06 PROCEDURE — 80053 COMPREHEN METABOLIC PANEL: CPT | Performed by: NURSE PRACTITIONER

## 2022-06-06 PROCEDURE — 99396 PREV VISIT EST AGE 40-64: CPT | Performed by: NURSE PRACTITIONER

## 2022-06-06 PROCEDURE — 99214 OFFICE O/P EST MOD 30 MIN: CPT | Mod: 25 | Performed by: NURSE PRACTITIONER

## 2022-06-06 PROCEDURE — 82043 UR ALBUMIN QUANTITATIVE: CPT | Performed by: NURSE PRACTITIONER

## 2022-06-06 RX ORDER — GLIPIZIDE 10 MG/1
TABLET, FILM COATED, EXTENDED RELEASE ORAL
Qty: 180 TABLET | Refills: 1 | Status: SHIPPED | OUTPATIENT
Start: 2022-06-06 | End: 2022-11-29

## 2022-06-06 RX ORDER — LISINOPRIL AND HYDROCHLOROTHIAZIDE 12.5; 2 MG/1; MG/1
TABLET ORAL
Qty: 180 TABLET | Refills: 1 | Status: SHIPPED | OUTPATIENT
Start: 2022-06-06 | End: 2022-11-29

## 2022-06-06 RX ORDER — ROSUVASTATIN CALCIUM 40 MG/1
40 TABLET, COATED ORAL DAILY
Qty: 90 TABLET | Refills: 3 | Status: SHIPPED | OUTPATIENT
Start: 2022-06-06 | End: 2022-06-07 | Stop reason: DRUGHIGH

## 2022-06-06 RX ORDER — ORAL SEMAGLUTIDE 14 MG/1
1 TABLET ORAL DAILY
Qty: 90 TABLET | Refills: 1 | Status: CANCELLED | OUTPATIENT
Start: 2022-06-06

## 2022-06-06 RX ORDER — WARFARIN SODIUM 5 MG/1
TABLET ORAL
Qty: 94 TABLET | Refills: 1 | Status: CANCELLED | OUTPATIENT
Start: 2022-06-06

## 2022-06-06 ASSESSMENT — ENCOUNTER SYMPTOMS
HEMATURIA: 0
NAUSEA: 0
PALPITATIONS: 0
DYSURIA: 0
HEMATOCHEZIA: 0
EYE PAIN: 0
FREQUENCY: 0
HEADACHES: 0
DIARRHEA: 0
COUGH: 0
CHILLS: 0
CONSTIPATION: 0
MYALGIAS: 0
ARTHRALGIAS: 0
NERVOUS/ANXIOUS: 0
ABDOMINAL PAIN: 0
HEARTBURN: 0
JOINT SWELLING: 0
DIZZINESS: 0
PARESTHESIAS: 0
SORE THROAT: 0
FEVER: 0
SHORTNESS OF BREATH: 0
WEAKNESS: 0

## 2022-06-06 ASSESSMENT — PAIN SCALES - GENERAL: PAINLEVEL: NO PAIN (0)

## 2022-06-06 NOTE — PROGRESS NOTES
"SUBJECTIVE:   CC: Schuyler Castro is an 56 year old male who presents for preventative health visit.         Healthy Habits:     Getting at least 3 servings of Calcium per day:  Yes    Bi-annual eye exam:  NO    Dental care twice a year:  NO    Sleep apnea or symptoms of sleep apnea:  None    Diet:  Diabetic    Frequency of exercise:  2-3 days/week    Duration of exercise:  45-60 minutes    Taking medications regularly:  Yes    Medication side effects:  None    PHQ-2 Total Score: 0    Additional concerns today:  No    {Add if <65 person on Medicare  - Required Questions (Optional):768067}  {Outside tests to abstract? :398453}    Diabetes Follow-up      How often are you checking your blood sugar? { :861849}    What concerns do you have today about your diabetes? { :600531::\"None\"}     Do you have any of these symptoms? (Select all that apply)  { :655112}    Have you had a diabetic eye exam in the last 12 months? { :448557}        {Reference  Diabetes Management Resources :570424}    {Reference  Diabetes Log - 7 days :211897}    Hyperlipidemia Follow-Up      Are you regularly taking any medication or supplement to lower your cholesterol?   { :437629}    Are you having muscle aches or other side effects that you think could be caused by your cholesterol lowering medication?  { :914169}    Hypertension Follow-up      Do you check your blood pressure regularly outside of the clinic? { :384276}     Are you following a low salt diet? { :069707}    Are your blood pressures ever more than 140 on the top number (systolic) OR more   than 90 on the bottom number (diastolic), for example 140/90? { :349801}    BP Readings from Last 2 Encounters:   06/06/22 132/76   09/11/21 (!) 140/90     Hemoglobin A1C POCT (%)   Date Value   04/24/2019 9.5 (H)   05/24/2018 8.9 (H)     Hemoglobin A1C (%)   Date Value   08/18/2021 10.1 (H)     LDL Cholesterol Calculated (mg/dL)   Date Value   08/18/2021 101 (H)   04/24/2019 80   05/24/2018 83 " "        Today's PHQ-2 Score:   PHQ-2 ( 1999 [employer]) 6/6/2022   Q1: Little interest or pleasure in doing things 0   Q2: Feeling down, depressed or hopeless 0   PHQ-2 Score 0   PHQ-2 Total Score (12-17 Years)- Positive if 3 or more points; Administer PHQ-A if positive -   Q1: Little interest or pleasure in doing things Not at all   Q2: Feeling down, depressed or hopeless Not at all   PHQ-2 Score 0       Abuse: Current or Past(Physical, Sexual or Emotional)- { :254149}  Do you feel safe in your environment? { :638492}    Have you ever done Advance Care Planning? (For example, a Health Directive, POLST, or a discussion with a medical provider or your loved ones about your wishes): { :807060}    Social History     Tobacco Use     Smoking status: Never Smoker     Smokeless tobacco: Never Used   Substance Use Topics     Alcohol use: Not Currently     Alcohol/week: 0.0 - 1.0 standard drinks     {Rooming Staff- Complete this question if Prescreen response is not shown below for today's visit. If you drink alcohol do you typically have >3 drinks per day or >7 drinks per week? (Optional):125988}    Alcohol Use 6/6/2022   Prescreen: >3 drinks/day or >7 drinks/week? No   Prescreen: >3 drinks/day or >7 drinks/week? -   {add AUDIT responses (Optional) (A score of 7 for adult men is an indication of hazardous drinking; a score of 8 or more is an indication of an alcohol use disorder.  A score of 7 or more for adult women is an indication of hazardous drinking or an alchohol use disorder):619534}    Last PSA:   PSA   Date Value Ref Range Status   08/24/2010 0.92 0 - 4 ug/L Final       Reviewed orders with patient. Reviewed health maintenance and updated orders accordingly - { :337226::\"Yes\"}  {Chronicprobdata (optional):180665}    Reviewed and updated as needed this visit by clinical staff   Tobacco  Allergies  Meds   Med Hx  Surg Hx  Fam Hx  Soc Hx          Reviewed and updated as needed this visit by Provider                 " "  {HISTORY OPTIONS (Optional):374838}    Review of Systems   Constitutional: Negative for chills and fever.   HENT: Negative for congestion, ear pain, hearing loss and sore throat.    Eyes: Negative for pain and visual disturbance.   Respiratory: Negative for cough and shortness of breath.    Cardiovascular: Negative for chest pain, palpitations and peripheral edema.   Gastrointestinal: Negative for abdominal pain, constipation, diarrhea, heartburn, hematochezia and nausea.   Genitourinary: Negative for dysuria, frequency, genital sores, hematuria, impotence, penile discharge and urgency.   Musculoskeletal: Negative for arthralgias, joint swelling and myalgias.   Skin: Negative for rash.   Neurological: Negative for dizziness, weakness, headaches and paresthesias.   Psychiatric/Behavioral: Negative for mood changes. The patient is not nervous/anxious.      {MALE ROS (Optional):449764::\"CONSTITUTIONAL: NEGATIVE for fever, chills, change in weight\",\"INTEGUMENTARY/SKIN: NEGATIVE for worrisome rashes, moles or lesions\",\"EYES: NEGATIVE for vision changes or irritation\",\"ENT: NEGATIVE for ear, mouth and throat problems\",\"RESP: NEGATIVE for significant cough or SOB\",\"CV: NEGATIVE for chest pain, palpitations or peripheral edema\",\"GI: NEGATIVE for nausea, abdominal pain, heartburn, or change in bowel habits\",\" male: negative for dysuria, hematuria, decreased urinary stream, erectile dysfunction, urethral discharge\",\"MUSCULOSKELETAL: NEGATIVE for significant arthralgias or myalgia\",\"NEURO: NEGATIVE for weakness, dizziness or paresthesias\",\"PSYCHIATRIC: NEGATIVE for changes in mood or affect\"}    OBJECTIVE:   /76 (BP Location: Right arm, Patient Position: Sitting, Cuff Size: Adult Large)   Pulse 96   Temp 99.1  F (37.3  C) (Oral)   Resp 16   Ht 1.795 m (5' 10.67\")   Wt 122 kg (269 lb)   SpO2 98%   BMI 37.87 kg/m      Physical Exam  {Exam Choices (Optional):276803}    {Diagnostic Test Results " "(Optional):645399::\"Diagnostic Test Results:\",\"Labs reviewed in Epic\"}    ASSESSMENT/PLAN:   {Diag Picklist:836930}    {Patient advised of split billing (Optional):066791}    COUNSELING:   {MALE COUNSELING MESSAGES:044603::\"Reviewed preventive health counseling, as reflected in patient instructions\"}    Estimated body mass index is 37.87 kg/m  as calculated from the following:    Height as of this encounter: 1.795 m (5' 10.67\").    Weight as of this encounter: 122 kg (269 lb).     {Weight Management Plan (ACO) Complete if BMI is abnormal-  Ages 18-64  BMI >24.9.  Age 65+ with BMI <23 or >30 (Optional):370514}    He reports that he has never smoked. He has never used smokeless tobacco.      Counseling Resources:  ATP IV Guidelines  Pooled Cohorts Equation Calculator  FRAX Risk Assessment  ICSI Preventive Guidelines  Dietary Guidelines for Americans, 2010  USDA's MyPlate  ASA Prophylaxis  Lung CA Screening    [facility]  "

## 2022-06-06 NOTE — PROGRESS NOTES
SUBJECTIVE:   CC: Schuyler Castro is an 56 year old male who presents for preventative health visit.         Healthy Habits:     Getting at least 3 servings of Calcium per day:  Yes    Bi-annual eye exam:  NO    Dental care twice a year:  NO    Sleep apnea or symptoms of sleep apnea:  None    Diet:  Diabetic    Frequency of exercise:  2-3 days/week    Duration of exercise:  45-60 minutes    Taking medications regularly:  Yes    Medication side effects:  None    PHQ-2 Total Score: 0    Additional concerns today:  No          Diabetes Follow-up      How often are you checking your blood sugar? Not at all    What concerns do you have today about your diabetes? None     Do you have any of these symptoms? (Select all that apply)  No numbness or tingling in feet.  No redness, sores or blisters on feet.  No complaints of excessive thirst.  No reports of blurry vision.  No significant changes to weight.    Have you had a diabetic eye exam in the last 12 months? No  Here today for refills of medication.   Tries to watch his diet, but not perfect.   Goes for walks every other day, about 2 miles when he walks.   Has lost 10 pounds.   Finds the most success with carb counting.   Plans to lose more weight by his birthday in August.    in [facility].   Due for eye exam.     Lab Results   Component Value Date    A1C 10.1 08/18/2021    A1C 9.5 04/24/2019    A1C 8.9 05/24/2018    A1C 6.8 01/23/2017    A1C 6.3 11/03/2015    A1C 6.4 10/24/2014                 Hyperlipidemia Follow-Up      Are you regularly taking any medication or supplement to lower your cholesterol?   Yes- Rosuvastatin 40 mg    Are you having muscle aches or other side effects that you think could be caused by your cholesterol lowering medication?  No    Hypertension Follow-up      Do you check your blood pressure regularly outside of the clinic? No     Are you following a low salt diet? Yes    Are your blood pressures ever more than 140 on the  top number (systolic) OR more   than 90 on the bottom number (diastolic), for example 140/90?     BP Readings from Last 2 Encounters:   06/06/22 132/76   09/11/21 (!) 140/90     Hemoglobin A1C POCT (%)   Date Value   04/24/2019 9.5 (H)   05/24/2018 8.9 (H)     Hemoglobin A1C (%)   Date Value   08/18/2021 10.1 (H)     LDL Cholesterol Calculated (mg/dL)   Date Value   08/18/2021 101 (H)   04/24/2019 80   05/24/2018 83         Today's PHQ-2 Score:   PHQ-2 ( 1999 [employer]) 6/6/2022   Q1: Little interest or pleasure in doing things 0   Q2: Feeling down, depressed or hopeless 0   PHQ-2 Score 0   PHQ-2 Total Score (12-17 Years)- Positive if 3 or more points; Administer PHQ-A if positive -   Q1: Little interest or pleasure in doing things Not at all   Q2: Feeling down, depressed or hopeless Not at all   PHQ-2 Score 0       Abuse: Current or Past(Physical, Sexual or Emotional)- No  Do you feel safe in your environment? Yes    Have you ever done Advance Care Planning? (For example, a Health Directive, POLST, or a discussion with a medical provider or your loved ones about your wishes): Yes, patient states has an Advance Care Planning document and will bring a copy to the clinic.    Social History     Tobacco Use     Smoking status: Never Smoker     Smokeless tobacco: Never Used   Substance Use Topics     Alcohol use: Not Currently     Alcohol/week: 0.0 - 1.0 standard drinks         Alcohol Use 6/6/2022   Prescreen: >3 drinks/day or >7 drinks/week? No   Prescreen: >3 drinks/day or >7 drinks/week? -       Last PSA:   PSA   Date Value Ref Range Status   08/24/2010 0.92 0 - 4 ug/L Final       Reviewed orders with patient. Reviewed health maintenance and updated orders accordingly - Yes  Labs reviewed in EPIC  BP Readings from Last 3 Encounters:   06/06/22 132/76   09/11/21 (!) 140/90   12/12/20 (!) 156/84    Wt Readings from Last 3 Encounters:   06/06/22 122 kg (269 lb)   09/11/21 126.9 kg (279 lb 11.2 oz)   12/12/20 128.8 kg (284  lb)                  Current Outpatient Medications   Medication Sig Dispense Refill     atenolol (TENORMIN) 50 MG tablet TAKE 1 TABLET BY MOUTH EVERY DAY 14 tablet 0     glipiZIDE (GLUCOTROL XL) 10 MG 24 hr tablet TAKE 2 TABLETS BY MOUTH EVERY DAY 28 tablet 0     lisinopril-hydrochlorothiazide (ZESTORETIC) 20-12.5 MG tablet TAKE 2 TABLETS BY MOUTH EVERY  tablet 0     metFORMIN (GLUCOPHAGE) 500 MG tablet TAKE 2 TABLETS BY MOUTH TWICE DAILY (WITH MEALS) 120 tablet 0     rosuvastatin (CRESTOR) 40 MG tablet TAKE 1 TABLET BY MOUTH EVERY DAY 14 tablet 0     RYBELSUS 14 MG TABS TAKE 1 EACH BY MOUTH DAILY USE THIRD 90 tablet 1     warfarin ANTICOAGULANT (COUMADIN) 5 MG tablet TAKE 7.5MG EVERY TU / TAKE 5MG ALL OTHER DAYS OR AS INSTRUCTED BY INR CLINIC 94 tablet 1     Acetaminophen (TYLENOL PO) Take 650 mg by mouth (Patient not taking: Reported on 6/6/2022)       aspirin 81 MG tablet Take 1 tablet (81 mg) by mouth daily (Patient not taking: Reported on 6/6/2022) 30 tablet 12     blood glucose (NO BRAND SPECIFIED) lancets standard Use to test blood sugar 2-3 times daily or as directed. (Patient not taking: Reported on 6/6/2022) 90 each 11     blood glucose (NO BRAND SPECIFIED) test strip Use to test blood sugar 2-3 times daily or as directed. (Patient not taking: Reported on 6/6/2022) 100 each 11     blood glucose monitoring (NO BRAND SPECIFIED) meter device kit Use to test blood sugar 2-3 times daily or as directed. (Patient not taking: Reported on 6/6/2022) 1 kit 0     No Known Allergies    Reviewed and updated as needed this visit by clinical staff   Tobacco  Allergies  Meds   Med Hx  Surg Hx  Fam Hx  Soc Hx          Reviewed and updated as needed this visit by Provider                   Past Medical History:   Diagnosis Date     Diabetes mellitus type 2, noninsulin dependent (H) 2002     DVT of deep femoral vein (H) 8/2010     Hypertension 1996     Pulmonary embolus (H) 8/2010     Type 2 diabetes mellitus  "with hyperglycemia (H) 10/25/2015      History reviewed. No pertinent surgical history.    Review of Systems   Constitutional: Negative for chills and fever.   HENT: Negative for congestion, ear pain, hearing loss and sore throat.    Eyes: Negative for pain and visual disturbance.   Respiratory: Negative for cough and shortness of breath.    Cardiovascular: Negative for chest pain, palpitations and peripheral edema.   Gastrointestinal: Negative for abdominal pain, constipation, diarrhea, heartburn, hematochezia and nausea.   Genitourinary: Negative for dysuria, frequency, genital sores, hematuria, impotence, penile discharge and urgency.   Musculoskeletal: Negative for arthralgias, joint swelling and myalgias.   Skin: Negative for rash.   Neurological: Negative for dizziness, weakness, headaches and paresthesias.   Psychiatric/Behavioral: Negative for mood changes. The patient is not nervous/anxious.          OBJECTIVE:   /76 (BP Location: Right arm, Patient Position: Sitting, Cuff Size: Adult Large)   Pulse 96   Temp 99.1  F (37.3  C) (Oral)   Resp 16   Ht 1.795 m (5' 10.67\")   Wt 122 kg (269 lb)   SpO2 98%   BMI 37.87 kg/m      Physical Exam  GENERAL: healthy, alert and no distress  EYES: Eyes grossly normal to inspection, PERRL and conjunctivae and sclerae normal  HENT: ear canals and TM's normal, nose and mouth without ulcers or lesions  NECK: no adenopathy, no asymmetry, masses, or scars and thyroid normal to palpation  RESP: lungs clear to auscultation - no rales, rhonchi or wheezes  CV: regular rate and rhythm, normal S1 S2, no S3 or S4, no murmur, click or rub, no peripheral edema and peripheral pulses strong  ABDOMEN: soft, nontender, no hepatosplenomegaly, no masses and bowel sounds normal  MS: no gross musculoskeletal defects noted, no edema  SKIN: no suspicious lesions or rashes  NEURO: Normal strength and tone, mentation intact and speech normal  PSYCH: mentation appears normal, affect " normal/bright    Diagnostic Test Results:  Labs reviewed in Epic    ASSESSMENT/PLAN:   1. Routine general medical examination at a health care facility  Reviewed age appropriate screenings and immunizations.     3. Screen for colon cancer  Normal FIT test several years ago.  Order for colonoscopy.   - Adult Gastro Ref - Procedure Only; Future    4. Personal history of DVT (deep vein thrombosis)  On warfarin; managed by anticoagulation clinic.     5. History of pulmonary embolism  Continues on warfarin.     6. Long term current use of anticoagulants with INR goal of 2.0-3.0  Hx of blood clots; continues on warfarin.     8. Type 2 diabetes mellitus with hyperglycemia, without long-term current use of insulin (H)  Has been uncontrolled.  Previous provider titrated rybelsus up.  Needs current A1c.  If still uncontrolled will have him follow-up with MTM.  He much prefers to stay off insulin. Plan for eye exam by next appt.   - rosuvastatin (CRESTOR) 40 MG tablet; Take 1 tablet (40 mg) by mouth daily  Dispense: 90 tablet; Refill: 3  - metFORMIN (GLUCOPHAGE) 500 MG tablet; Take 2 tablets (1,000 mg) by mouth 2 times daily (with meals)  Dispense: 360 tablet; Refill: 1  - glipiZIDE (GLUCOTROL XL) 10 MG 24 hr tablet; TAKE 2 TABLETS BY MOUTH EVERY DAY  Dispense: 180 tablet; Refill: 1  - Comprehensive metabolic panel (BMP + Alb, Alk Phos, ALT, AST, Total. Bili, TP); Future  - Lipid panel reflex to direct LDL Fasting; Future  - Hemoglobin A1c; Future  - Albumin Random Urine Quantitative with Creat Ratio; Future    9. Hypertension goal BP (blood pressure) < 140/90  Chronic, well controlled.  Continue current medications.  Surveillance labs today.   - lisinopril-hydrochlorothiazide (ZESTORETIC) 20-12.5 MG tablet; TAKE 2 TABLETS BY MOUTH EVERY DAY  Dispense: 180 tablet; Refill: 1  - Comprehensive metabolic panel (BMP + Alb, Alk Phos, ALT, AST, Total. Bili, TP); Future    10. Hyperlipidemia LDL goal <100  Switched to crestor last  "year; hasn't had lipids checked since changing statin.    - rosuvastatin (CRESTOR) 40 MG tablet; Take 1 tablet (40 mg) by mouth daily  Dispense: 90 tablet; Refill: 3  - Comprehensive metabolic panel (BMP + Alb, Alk Phos, ALT, AST, Total. Bili, TP); Future  - Lipid panel reflex to direct LDL Fasting; Future    11. Morbid obesity (H)  Discussed diet and exercise.  Weight loss will help with control of HTN, cholesterol and HTN.           COUNSELING:   Reviewed preventive health counseling, as reflected in patient instructions       Regular exercise       Healthy diet/nutrition       Colorectal cancer screening       Prostate cancer screening    Estimated body mass index is 37.87 kg/m  as calculated from the following:    Height as of this encounter: 1.795 m (5' 10.67\").    Weight as of this encounter: 122 kg (269 lb).     Weight management plan: Discussed healthy diet and exercise guidelines    He reports that he has never smoked. He has never used smokeless tobacco.      Counseling Resources:  ATP IV Guidelines  Pooled Cohorts Equation Calculator  FRAX Risk Assessment  ICSI Preventive Guidelines  Dietary Guidelines for Americans, 2010  USDA's MyPlate  ASA Prophylaxis  Lung CA Screening    Jess Kauffman, CHIQUI CNP  [facility]  "

## 2022-06-07 RX ORDER — ROSUVASTATIN CALCIUM 20 MG/1
20 TABLET, COATED ORAL DAILY
Qty: 90 TABLET | Refills: 3 | Status: SHIPPED | OUTPATIENT
Start: 2022-06-07 | End: 2023-07-12

## 2022-06-07 RX ORDER — ATENOLOL 50 MG/1
50 TABLET ORAL DAILY
Qty: 90 TABLET | Refills: 3 | Status: SHIPPED | OUTPATIENT
Start: 2022-06-07 | End: 2023-05-11

## 2022-06-07 RX ORDER — ROSUVASTATIN CALCIUM 20 MG/1
20 TABLET, COATED ORAL DAILY
Qty: 90 TABLET | Refills: 3 | Status: SHIPPED | OUTPATIENT
Start: 2022-06-07 | End: 2022-06-07

## 2022-06-07 NOTE — TELEPHONE ENCOUNTER
Prescription atenolol approved per [facility] Refill Protocol.    Joanie Champagne RN on 6/7/2022 at 11:26 AM

## 2022-06-08 RX ORDER — WARFARIN SODIUM 5 MG/1
TABLET ORAL
Qty: 100 TABLET | Refills: 1 | Status: SHIPPED | OUTPATIENT
Start: 2022-06-08 | End: 2022-08-29

## 2022-06-08 NOTE — TELEPHONE ENCOUNTER
ANTICOAGULATION MANAGEMENT:  Medication Refill    Anticoagulation Summary  As of 5/6/2022    Warfarin maintenance plan:  7.5 mg (5 mg x 1.5) every Tue, Fri; 5 mg (5 mg x 1) all other days   Next INR check:  7/1/2022   Target end date:  Indefinite    Indications    Personal history of DVT (deep vein thrombosis) (Resolved) [Z86.718]  History of pulmonary embolism [Z86.711]  Long term current use of anticoagulants with INR goal of 2.0-3.0 [Z79.01]  Personal history of DVT (deep vein thrombosis) [Z86.718]             Anticoagulation Care Providers     Provider Role Specialty Phone number    Rasheeda Juan MD Referring Family Medicine [phone]          Visit with referring provider/group within last year: Yes    [facility] referral signed within last year: Yes    Schuyler meets all criteria for refill (current [facility] referral, office visit with referring provider/group in last year, lab monitoring up to date or not exceeding 2 weeks overdue). Rx instructions and quantity supplied updated to match patient's current dosing plan. Warfarin 90 day supply with 1 refill granted per [facility] protocol     Violeta Sanchez RN  Anticoagulation Clinic

## 2022-06-20 ENCOUNTER — OFFICE VISIT (OUTPATIENT)
Dept: PHARMACY | Facility: CLINIC | Age: 57
End: 2022-06-20
Attending: NURSE PRACTITIONER
Payer: COMMERCIAL

## 2022-06-20 DIAGNOSIS — E11.65 TYPE 2 DIABETES MELLITUS WITH HYPERGLYCEMIA, WITHOUT LONG-TERM CURRENT USE OF INSULIN (H): Primary | ICD-10-CM

## 2022-06-20 DIAGNOSIS — E78.5 HYPERLIPIDEMIA LDL GOAL <100: ICD-10-CM

## 2022-06-20 DIAGNOSIS — Z86.718 PERSONAL HISTORY OF DVT (DEEP VEIN THROMBOSIS): ICD-10-CM

## 2022-06-20 DIAGNOSIS — Z86.711 HISTORY OF PULMONARY EMBOLISM: ICD-10-CM

## 2022-06-20 DIAGNOSIS — I10 HYPERTENSION GOAL BP (BLOOD PRESSURE) < 140/90: ICD-10-CM

## 2022-06-20 PROCEDURE — 99605 MTMS BY PHARM NP 15 MIN: CPT | Performed by: PHARMACIST

## 2022-06-20 PROCEDURE — 99607 MTMS BY PHARM ADDL 15 MIN: CPT | Performed by: PHARMACIST

## 2022-06-20 NOTE — Clinical Note
Thanks for the referral! He was not wanting to make medication changes today and really felt that he could improve his numbers with diet changes. He set some goals, see plan, and I will be checking in with him in about 5 weeks. Will discuss either switching Rybelsus to once weekly injection if he is not able to continue to take with the very specific instructions for best absorption. If he doesn't not have improvement in blood sugars then I will talk about med changes. Another option could be adding an SGLT-2 inhibitor.  He has been off aspirin for years per his report but it was still on his med list.  I assume once he started warfarin he stopped aspirin. I took it off his med list. If you feel there is an indication for this let me know. Thanks.   Yenifer

## 2022-06-20 NOTE — PROGRESS NOTES
Medication Therapy Management (MTM) Encounter    ASSESSMENT:                            Medication Adherence/Access: No issues identified    Type 2 Diabetes: Patient is not meeting A1c goal of < 7%. Patient would benefit from switching Rybelsus to Ozempic as he may have better results as Rybelsus has very specific administration instructions. He feels that he can do this and doesn't want to make a change today.  Another option would be starting an SGLT2 inhibitor. He would like to work on his lifestyle goals, see plan.  He would benefit from increased exercise, increase in home glucose monitoring and weight loss recommended.  I asked him to send me a Network message in about 5 weeks with some blood sugar readings so we can determine if medication changes need to be made.  I would recommend changes if we have not seen any improvement in blood sugars.  Due for annual foot exam. Microalbumin is not at goal < 30 mg/g. Taking an ACE-inhibitor at max dose.  Due for annual eye exam.  Aspirin therapy is not indicated in this patient due to anticoagulant/antiplatelet therapy.     Hyperlipidemia: Patient is on high intensity statin which is indicated based on 2019 ACC/AHA guidelines for lipid management.      Personal history of DVT (deep vein thrombosis)/PE: Stable, followed and managed by anticoagulation team.    Hypertension: Patient is meeting blood pressure goal of < 140/90mmHg.    PLAN:                            1. When you get back from vacation start your jogging 5 days a week.    2. Start the lifting classes three days a week.     3. Start carb counting again.     4. Start checking blood sugars at least twice a week. Some fasting and some 2 hour after dinner.    5. If morning readings are not less than 130 and 2 hours after dinner reading are not less than 150 in a month let me know if you want to switch Rybelsus to Ozempic.     6.  Schedule a diabetic eye exam.  This should be done every year to make sure that the  diabetes has not caused retinopathy in your eyes.    Follow-up: Return in about 3 months (around 9/20/2022). Follow up via [employer] in 5 weeks.    SUBJECTIVE/OBJECTIVE:                          Schuyler Castro is a 56 year old male seen for an initial visit. He was referred to me from Jess Kauffman CNP.      Reason for visit: uncontrolled diabetes.    Allergies/ADRs: Reviewed in chart  Past Medical History: Reviewed in chart  Tobacco: He reports that he has never smoked. He has never used smokeless tobacco.  Alcohol: not currently using    Medication Adherence/Access: no issues reported and has been better about not taking anything for 30 minutes after Rybelsus    Type 2 Diabetes:  Currently taking Rybelsus 14 mg once daily - had not been taking on empty stomach all the time but better lately (on this dose since 10/2021), metformin 1000 mg twice daily and glipizide XL 20 mg daily. Does feel the Rybelsus has reduced his appetite. Patient is not experiencing side effects.  Blood sugar monitoring: never. Has supplies but not checking. He is not interested in a CGM  Symptoms of low blood sugar? none  Symptoms of high blood sugar? none  Eye exam: due  Foot exam: due  Diet/Exercise: He has been eating more carbs and sugars in the afternoons. He has lots of treats at home. He eats breakfast with his coworkers. He knows what he has to do but has not done it. He was drinking one regular pop per day but has stopped that about 2 weeks ago.  He was drinking lots of candy and pop during COVID.   Aspirin: Not taking due to warfarin use  Statin: Yes: rosuvastatin 20 mg   ACEi/ARB: Yes: lisinopril 40 mg daily.   Urine Albumin:   Lab Results   Component Value Date    UMALCR 84.84 (H) 06/06/2022      Lab Results   Component Value Date    A1C 10.5 06/06/2022    A1C 10.1 08/18/2021    A1C 9.5 04/24/2019    A1C 8.9 05/24/2018    A1C 6.8 01/23/2017    A1C 6.3 11/03/2015    A1C 6.4 10/24/2014     Last Comprehensive Metabolic  Panel:  Sodium   Date Value Ref Range Status   06/06/2022 136 133 - 144 mmol/L Final   04/24/2019 134 133 - 144 mmol/L Final     Potassium   Date Value Ref Range Status   06/06/2022 4.8 3.4 - 5.3 mmol/L Final   04/24/2019 4.1 3.4 - 5.3 mmol/L Final     Chloride   Date Value Ref Range Status   06/06/2022 105 94 - 109 mmol/L Final   04/24/2019 103 94 - 109 mmol/L Final     Carbon Dioxide   Date Value Ref Range Status   04/24/2019 20 20 - 32 mmol/L Final     Carbon Dioxide (CO2)   Date Value Ref Range Status   06/06/2022 26 20 - 32 mmol/L Final     Anion Gap   Date Value Ref Range Status   06/06/2022 5 3 - 14 mmol/L Final   04/24/2019 11 3 - 14 mmol/L Final     Glucose   Date Value Ref Range Status   06/06/2022 269 (H) 70 - 99 mg/dL Final   04/24/2019 263 (H) 70 - 99 mg/dL Final     Comment:     Fasting specimen     Urea Nitrogen   Date Value Ref Range Status   06/06/2022 31 (H) 7 - 30 mg/dL Final   04/24/2019 21 7 - 30 mg/dL Final     Creatinine   Date Value Ref Range Status   06/06/2022 1.36 (H) 0.66 - 1.25 mg/dL Final   04/24/2019 1.12 0.66 - 1.25 mg/dL Final     GFR Estimate   Date Value Ref Range Status   06/06/2022 62 >60 mL/min/1.73m2 Final     Comment:     Effective December 21, 2021 eGFRcr in adults is calculated using the 2021 CKD-EPI creatinine equation which includes age and gender (Yue et al., NEJM, DOI: 10.1056/PWMWns8621160)   04/24/2019 75 >60 mL/min/[1.73_m2] Final     Comment:     Non  GFR Calc  Starting 12/18/2018, serum creatinine based estimated GFR (eGFR) will be   calculated using the Chronic Kidney Disease Epidemiology Collaboration   (CKD-EPI) equation.       Calcium   Date Value Ref Range Status   06/06/2022 9.6 8.5 - 10.1 mg/dL Final   04/24/2019 9.0 8.5 - 10.1 mg/dL Final     Hyperlipidemia: Current therapy includes rosuvastatin 20 mg daily (reduced earlier this month by PCP due to low LDL).  Patient reports no significant myalgias or other side effects.    Recent Labs    Lab Test 06/06/22  0849 08/18/21  0950 01/23/17  0923 11/03/15  0734 10/24/14  0839   CHOL 94 188   < > 148 150   HDL 40 46   < > 40* 46   LDL 24 101*   < > 78 79   TRIG 151* 203*   < > 151* 123   CHOLHDLRATIO  --   --   --  3.7 3.3    < > = values in this interval not displayed.     Personal history of DVT (deep vein thrombosis)/PE: Currently taking warfarin 5 mg every day except 7.5 mg on Tuesdays and Fridays. No bleeding or bruising. Follow with the INR nurse. He stopped the aspirin on his own about 8 years ago because he didn't think he needed both aspirin and warfarin. He does not check blood pressure at home.    INR   Date Value Ref Range Status   05/06/2022 2.0 (H) 0.9 - 1.1 Final   06/07/2021 2.40 (H) 0.86 - 1.14 Final     Comment:     This test is intended for monitoring Coumadin therapy.  Results are not   accurate in patients with prolonged INR due to factor deficiency.       Hypertension: Current medications include atenolol 50 mg once daily, Lisinopril/hydrochlorothiazide 20/12.5 - takes two tablets daily.  Patient does not self-monitor blood pressure.  Patient reports no current medication side effects.  BP Readings from Last 3 Encounters:   06/20/22 121/83   06/06/22 132/76   09/11/21 (!) 140/90     Today's Vitals: /83   Pulse 96   Wt 266 lb 1.6 oz (120.7 kg)   BMI 37.46 kg/m    ----------------    I spent 35 minutes with this patient today. A copy of the visit note was provided to the patient's provider(s).    The patient was sent via [employer] a summary of these recommendations.     Yenifer De La Fuente, PharmD  Medication Therapy Management Pharmacist     Medication Therapy Recommendations  Type 2 diabetes mellitus with hyperglycemia (H)    Current Medication: RYBELSUS 14 MG TABS   Rationale: More effective medication available - Ineffective medication - Effectiveness   Recommendation: Change Medication - Ozempic (0.25 or 0.5 MG/DOSE) 2 MG/1.5ML Sopn   Status: Declined per Patient

## 2022-06-20 NOTE — PATIENT INSTRUCTIONS
"Recommendations from today's MTM visit:                                                    MTM (medication therapy management) is a service provided by a clinical pharmacist designed to help you get the most of out of your medicines.   Today we reviewed what your medicines are for, how to know if they are working, that your medicines are safe and how to make your medicine regimen as easy as possible.      1. When you get back from vacation start your jogging 5 days a week.    2. Start the lifting classes three days a week at the gym.     3. Start carb counting again.     4. Start checking blood sugars at least twice a week. Some fasting and some 2 hour after dinner.    5. If morning readings are not less than 130 and 2 hours after dinner reading are not less than 150 in a month let me know and we should talk about switch Rybelsus to Ozempic or Trulicity once weekly injections depending on which one is covered.     Follow-up: Return in about 3 months (around 9/20/2022). We will check you A1C at that time.    It was great speaking with you today.  I value your experience and would be very thankful for your time in providing feedback in our clinic survey. In the next few days, you may receive an email or text message from [employer] with a link to a survey related to your  clinical pharmacist.\"     To schedule another MTM appointment, please call the clinic directly or you may call the MTM scheduling line at [phone] or toll-free at [phone].     My Clinical Pharmacist's contact information:                                                      Please feel free to contact me with any questions or concerns you have.      Yenifer De La Fuente, PharmD  Medication Therapy Management Pharmacist  [facility] - Monday and Wednesday 7:30 - 4:00  Pager: [phone]      "

## 2022-06-22 VITALS
WEIGHT: 266.1 LBS | SYSTOLIC BLOOD PRESSURE: 121 MMHG | HEART RATE: 96 BPM | BODY MASS INDEX: 37.46 KG/M2 | DIASTOLIC BLOOD PRESSURE: 83 MMHG

## 2022-07-12 ENCOUNTER — LAB (OUTPATIENT)
Dept: LAB | Facility: CLINIC | Age: 57
End: 2022-07-12
Payer: COMMERCIAL

## 2022-07-12 ENCOUNTER — ANTICOAGULATION THERAPY VISIT (OUTPATIENT)
Dept: ANTICOAGULATION | Facility: CLINIC | Age: 57
End: 2022-07-12

## 2022-07-12 DIAGNOSIS — Z79.01 LONG TERM CURRENT USE OF ANTICOAGULANTS WITH INR GOAL OF 2.0-3.0: ICD-10-CM

## 2022-07-12 DIAGNOSIS — Z86.718 PERSONAL HISTORY OF DVT (DEEP VEIN THROMBOSIS): ICD-10-CM

## 2022-07-12 DIAGNOSIS — Z86.711 HISTORY OF PULMONARY EMBOLISM: ICD-10-CM

## 2022-07-12 DIAGNOSIS — Z86.711 HISTORY OF PULMONARY EMBOLISM: Primary | ICD-10-CM

## 2022-07-12 LAB — INR BLD: 2 (ref 0.9–1.1)

## 2022-07-12 PROCEDURE — 85610 PROTHROMBIN TIME: CPT

## 2022-07-12 PROCEDURE — 36416 COLLJ CAPILLARY BLOOD SPEC: CPT

## 2022-07-12 NOTE — PROGRESS NOTES
ANTICOAGULATION MANAGEMENT     Schuyler Castro 56 year old male is on warfarin with therapeutic INR result. (Goal INR 2.0-3.0)    Recent labs: (last 7 days)     07/12/22  0936   INR 2.0*       ASSESSMENT       Source(s): Chart Review and Patient/Caregiver Call       Warfarin doses taken: Warfarin taken as instructed    Diet: No new diet changes identified    New illness, injury, or hospitalization: No    Medication/supplement changes: no changes right now, may have changes to diabetic meds    Signs or symptoms of bleeding or clotting: No    Previous INR: Therapeutic last 2(+) visits    Additional findings: None       PLAN     Recommended plan for no diet, medication or health factor changes affecting INR     Dosing Instructions: continue your current warfarin dose with next INR in 7 weeks       Summary  As of 7/12/2022    Full warfarin instructions:  7.5 mg every Tue, Fri; 5 mg all other days   Next INR check:  8/30/2022             Telephone call with David who agrees to plan and repeated back plan correctly    Lab visit scheduled    Education provided: Please call back if any changes to your diet, medications or how you've been taking warfarin and Contact [phone]  with any changes, questions or concerns.     Plan made per [facility] anticoagulation protocol    Cailin Dockery RN  Anticoagulation Clinic  7/12/2022    _______________________________________________________________________     Anticoagulation Episode Summary     Current INR goal:  2.0-3.0   TTR:  83.7 % (1 y)   Target end date:  Indefinite   Send INR reminders to:  [facility]    Indications    Personal history of DVT (deep vein thrombosis) (Resolved) [Z86.718]  History of pulmonary embolism [Z86.711]  Long term current use of anticoagulants with INR goal of 2.0-3.0 [Z79.01]  Personal history of DVT (deep vein thrombosis) [Z86.718]           Comments:           Anticoagulation Care Providers     Provider Role Specialty Phone number    Drake,  MD Rasheeda [facility] Medicine [phone]

## 2022-08-27 DIAGNOSIS — I26.99 OTHER ACUTE PULMONARY EMBOLISM WITHOUT ACUTE COR PULMONALE (H): ICD-10-CM

## 2022-08-27 DIAGNOSIS — Z86.718 PERSONAL HISTORY OF DVT (DEEP VEIN THROMBOSIS): ICD-10-CM

## 2022-08-27 DIAGNOSIS — Z79.01 LONG TERM CURRENT USE OF ANTICOAGULANTS WITH INR GOAL OF 2.0-3.0: ICD-10-CM

## 2022-08-27 DIAGNOSIS — Z86.711 HISTORY OF PULMONARY EMBOLISM: ICD-10-CM

## 2022-08-29 RX ORDER — WARFARIN SODIUM 5 MG/1
TABLET ORAL
Qty: 100 TABLET | Refills: 1 | Status: SHIPPED | OUTPATIENT
Start: 2022-08-29 | End: 2023-04-07

## 2022-08-29 NOTE — TELEPHONE ENCOUNTER
ANTICOAGULATION MANAGEMENT:  Medication Refill    Anticoagulation Summary  As of 7/12/2022    Warfarin maintenance plan:  7.5 mg (5 mg x 1.5) every Tue, Fri; 5 mg (5 mg x 1) all other days   Next INR check:  8/30/2022   Target end date:  Indefinite    Indications    Personal history of DVT (deep vein thrombosis) (Resolved) [Z86.718]  History of pulmonary embolism [Z86.711]  Long term current use of anticoagulants with INR goal of 2.0-3.0 [Z79.01]  Personal history of DVT (deep vein thrombosis) [Z86.718]             Anticoagulation Care Providers     Provider Role Specialty Phone number    Rasheeda Juan MD Referring Family Medicine [phone]          Visit with referring provider/group within last year: Yes     [facility] referral signed within last year: Yes    Schuyler meets all criteria for refill (current [facility] referral, office visit with referring provider/group in last year, lab monitoring up to date or not exceeding 2 weeks overdue). Rx instructions and quantity supplied updated to match patient's current dosing plan. Warfarin 90 day supply with 1 refill granted per ACC protocol     Betsy Hill RN  Anticoagulation Clinic

## 2022-09-20 DIAGNOSIS — E11.65 TYPE 2 DIABETES MELLITUS WITH HYPERGLYCEMIA, WITHOUT LONG-TERM CURRENT USE OF INSULIN (H): ICD-10-CM

## 2022-09-20 NOTE — TELEPHONE ENCOUNTER
Patient has 25 tablets left. Informed him it could be too soon to request, patient verbalized understanding.    Stpehanie SAMUEL  [facility] Clinic/Hospital   [facility]

## 2022-09-21 ENCOUNTER — ANTICOAGULATION THERAPY VISIT (OUTPATIENT)
Dept: ANTICOAGULATION | Facility: CLINIC | Age: 57
End: 2022-09-21

## 2022-09-21 ENCOUNTER — LAB (OUTPATIENT)
Dept: LAB | Facility: CLINIC | Age: 57
End: 2022-09-21
Payer: COMMERCIAL

## 2022-09-21 DIAGNOSIS — Z86.711 HISTORY OF PULMONARY EMBOLISM: ICD-10-CM

## 2022-09-21 DIAGNOSIS — Z86.718 PERSONAL HISTORY OF DVT (DEEP VEIN THROMBOSIS): ICD-10-CM

## 2022-09-21 DIAGNOSIS — Z79.01 LONG TERM CURRENT USE OF ANTICOAGULANTS WITH INR GOAL OF 2.0-3.0: ICD-10-CM

## 2022-09-21 DIAGNOSIS — Z86.711 HISTORY OF PULMONARY EMBOLISM: Primary | ICD-10-CM

## 2022-09-21 LAB — INR BLD: 2.4 (ref 0.9–1.1)

## 2022-09-21 PROCEDURE — 85610 PROTHROMBIN TIME: CPT

## 2022-09-21 PROCEDURE — 36416 COLLJ CAPILLARY BLOOD SPEC: CPT

## 2022-09-21 RX ORDER — ORAL SEMAGLUTIDE 14 MG/1
1 TABLET ORAL DAILY
Qty: 90 TABLET | Refills: 1 | OUTPATIENT
Start: 2022-09-21

## 2022-09-21 NOTE — PROGRESS NOTES
ANTICOAGULATION MANAGEMENT     Schuyler Castro 57 year old male is on warfarin with therapeutic INR result. (Goal INR 2.0-3.0)    Recent labs: (last 7 days)     09/21/22  0753   INR 2.4*       ASSESSMENT       Source(s): Chart Review and Patient/Caregiver Call       Warfarin doses taken: Warfarin taken as instructed    Diet: No new diet changes identified    New illness, injury, or hospitalization: No    Medication/supplement changes: None noted    Signs or symptoms of bleeding or clotting: No    Previous INR: Therapeutic last 2(+) visits    Additional findings: Upcoming surgery/procedure Colonoscopy due in Jan 2023 - pt instructed to keep ACC team posted on scheduled date       PLAN     Recommended plan for no diet, medication or health factor changes affecting INR     Dosing Instructions: Continue your current warfarin dose with next INR in 12 weeks       Summary  As of 9/21/2022    Full warfarin instructions:  7.5 mg every Tue, Fri; 5 mg all other days   Next INR check:  12/12/2022             Telephone call with David who verbalizes understanding and agrees to plan    Lab visit scheduled    Education provided: Please call back if any changes to your diet, medications or how you've been taking warfarin, Monitoring for bleeding signs and symptoms, Monitoring for clotting signs and symptoms and Importance of notifying clinic for changes in medications; a sooner lab recheck maybe needed.    Plan made per [facility] anticoagulation protocol    Yadira Casas RN  Anticoagulation Clinic  9/21/2022    _______________________________________________________________________     Anticoagulation Episode Summary     Current INR goal:  2.0-3.0   TTR:  83.7 % (1 y)   Target end date:  Indefinite   Send INR reminders to:  [facility]    Indications    Personal history of DVT (deep vein thrombosis) (Resolved) [Z86.718]  History of pulmonary embolism [Z86.711]  Long term current use of anticoagulants with INR goal of  2.0-3.0 [Z79.01]  Personal history of DVT (deep vein thrombosis) [Z86.718]           Comments:           Anticoagulation Care Providers     Provider Role Specialty Phone number    Rasheeda Juan MD Referring Family Medicine [phone]

## 2022-09-26 ENCOUNTER — MYC MEDICAL ADVICE (OUTPATIENT)
Dept: FAMILY MEDICINE | Facility: CLINIC | Age: 57
End: 2022-09-26

## 2022-11-16 ENCOUNTER — OFFICE VISIT (OUTPATIENT)
Dept: PHARMACY | Facility: CLINIC | Age: 57
End: 2022-11-16
Payer: COMMERCIAL

## 2022-11-16 ENCOUNTER — LAB (OUTPATIENT)
Dept: LAB | Facility: CLINIC | Age: 57
End: 2022-11-16
Payer: COMMERCIAL

## 2022-11-16 VITALS
DIASTOLIC BLOOD PRESSURE: 79 MMHG | SYSTOLIC BLOOD PRESSURE: 127 MMHG | BODY MASS INDEX: 37.53 KG/M2 | WEIGHT: 266.6 LBS | HEART RATE: 92 BPM

## 2022-11-16 DIAGNOSIS — E11.65 TYPE 2 DIABETES MELLITUS WITH HYPERGLYCEMIA, WITHOUT LONG-TERM CURRENT USE OF INSULIN (H): Primary | ICD-10-CM

## 2022-11-16 DIAGNOSIS — E11.65 TYPE 2 DIABETES MELLITUS WITH HYPERGLYCEMIA, WITHOUT LONG-TERM CURRENT USE OF INSULIN (H): ICD-10-CM

## 2022-11-16 LAB
ANION GAP SERPL CALCULATED.3IONS-SCNC: 15 MMOL/L (ref 7–15)
BUN SERPL-MCNC: 28.1 MG/DL (ref 6–20)
CALCIUM SERPL-MCNC: 9.5 MG/DL (ref 8.6–10)
CHLORIDE SERPL-SCNC: 102 MMOL/L (ref 98–107)
CREAT SERPL-MCNC: 1.39 MG/DL (ref 0.67–1.17)
DEPRECATED HCO3 PLAS-SCNC: 19 MMOL/L (ref 22–29)
GFR SERPL CREATININE-BSD FRML MDRD: 60 ML/MIN/1.73M2
GLUCOSE SERPL-MCNC: 256 MG/DL (ref 70–99)
HBA1C MFR BLD: 10.2 % (ref 0–5.6)
HOLD SPECIMEN: NORMAL
POTASSIUM SERPL-SCNC: 4.5 MMOL/L (ref 3.4–5.3)
SODIUM SERPL-SCNC: 136 MMOL/L (ref 136–145)

## 2022-11-16 PROCEDURE — 83036 HEMOGLOBIN GLYCOSYLATED A1C: CPT

## 2022-11-16 PROCEDURE — 99607 MTMS BY PHARM ADDL 15 MIN: CPT | Performed by: PHARMACIST

## 2022-11-16 PROCEDURE — 36415 COLL VENOUS BLD VENIPUNCTURE: CPT

## 2022-11-16 PROCEDURE — 99606 MTMS BY PHARM EST 15 MIN: CPT | Performed by: PHARMACIST

## 2022-11-16 PROCEDURE — 80048 BASIC METABOLIC PNL TOTAL CA: CPT

## 2022-11-16 NOTE — Clinical Note
See assessment for plan today. Let me know if you agree. I think he should be on insulin but very resistant. Rechecking BMP today before starting Jardiance to ensure his function is stable.  Yenifer

## 2022-11-16 NOTE — PROGRESS NOTES
Medication Therapy Management (MTM) Encounter    ASSESSMENT:                            Medication Adherence/Access: No issues identified    Type 2 Diabetes: Patient is not meeting A1c goal of < 7%. Patient would benefit from adding Jardiance per patient preference to stay on oral medications.  We may need to consider reducing glipizide if he continues to have low blood sugars which are likely due to decreased carbs with meals.  This could become more frequent if he implements his lifestyle changes on a more consistent basis.  We will recheck kidney function today prior to starting Jardiance to ensure kidney function is stable.  We discussed that with his A1c continuing to be over 10% that starting a basal insulin would be best today however due to him not wanting to use injectables at this time he is opted to start Jardiance instead.  We did discuss the complications of diabetes and the damage that can occur from high blood sugars when not treated effectively.  If we are not able to start Jardiance due to kidney function worsening, would recommend switching Rybelsus to Ozempic.  Did consider starting Wegovy due to the pen device being an autoinjector and he would not need to see the needle however we will be starting doses are unavailable.  We could consider transitioning from Ozempic to Wegovy once the supply issue is resolved which should be early next year. Trulicity would be an option however it does not tend to work as well as Ozempic for experience.  We did discuss that if his A1c has not dropped below 9% in February on recheck that we would be starting a once daily basal insulin.  Encouraged him to continue to work on lifestyle changes and weight loss.  Provided him with some weight loss resources, see AVS.  We did discuss possible side effects of Jardiance and he is aware of what to watch for to include signs of diabetic ketoacidosis. Due for annual foot exam, he knows he needs to schedule. Microalbumin is  not at goal < 30 mg/g. Taking an ACE-inhibitor at max dose.  Due for annual eye exam.  Aspirin therapy is not indicated in this patient due to anticoagulant/antiplatelet therapy.     PLAN:                            1. Once we get your kidney function results back we will determine if we can start Jardiance. Keep an eye on mychart result notes. If we cant start Jardiance we will switch Rybelsus to once weekly Ozempic.    2.  If your A1C is not less than 9% in Feb then we will need to start once daily long acting insulin.    3.  Continue to work on lifestyle changes and work on weight loss.      Follow-up: Return in 5 weeks (on 2022) for MTM office visit with Yenifer De La Fuente, Lab Work.    SUBJECTIVE/OBJECTIVE:                          Schuyler Castro is a 57 year old male coming in for a follow-up visit.  Today's visit is a follow-up MTM visit from 22     Reason for visit: diabetes follow-up.    Allergies/ADRs: Reviewed in chart  Past Medical History: Reviewed in chart  Tobacco: He reports that he has never smoked. He has never used smokeless tobacco.  Alcohol: not currently using    Medication Adherence/Access: no issues reported and has been better about not taking anything for 30 minutes after Rybelsus    Type 2 Diabetes:  Currently taking Rybelsus 14 mg once daily - has not been taking on empty stomach all the time (on this dose since 10/2021), metformin 1000 mg twice daily and glipizide XL 20 mg daily. Does feel the Rybelsus has reduced his appetite. Patient is not experiencing side effects. He is not interested in using a CGM at this time. He really doesn't want to use injectable medicine. He wants to work on lifestyle and willing to add another oral medication.     Blood sugar monitoring: Am fastin, 160, 220; 2 hours after lunch: 218 - he did have a low reading yesterday due to light carb lunch.    From last MTM visit:  Blood sugar monitoring: never. Has supplies but not checking. He is not  interested in a CGM    Symptoms of low blood sugar? Just tired and could tell it is off  Symptoms of high blood sugar? none  Eye exam: due  Foot exam: due  Diet/Exercise: He is doing 3 mile hikes three times a week. He is watching his carbs and does cheat on the weekends sometimes. He is eating three meals a day and snacks in between sometimes. He knows what he needs to do but he is not consistent.     He has been eating more carbs and sugars in the afternoons. He has lots of treats at home. He eats breakfast with his coworkers. He knows what he has to do but has not done it. He was drinking one regular pop per day but has stopped that about 2 weeks ago.  He was drinking lots of candy and pop during COVID.   Aspirin: Not taking due to warfarin use  Statin: Yes: rosuvastatin 20 mg   ACEi/ARB: Yes: lisinopril 40 mg daily.   Urine Albumin:   Lab Results   Component Value Date    UMALCR 84.84 (H) 06/06/2022      Lab Results   Component Value Date    A1C 10.2 11/16/2022    A1C 10.5 06/06/2022    A1C 10.1 08/18/2021    A1C 9.5 04/24/2019    A1C 8.9 05/24/2018    A1C 6.8 01/23/2017    A1C 6.3 11/03/2015    A1C 6.4 10/24/2014     Last Comprehensive Metabolic Panel:  Sodium   Date Value Ref Range Status   06/06/2022 136 133 - 144 mmol/L Final   04/24/2019 134 133 - 144 mmol/L Final     Potassium   Date Value Ref Range Status   06/06/2022 4.8 3.4 - 5.3 mmol/L Final   04/24/2019 4.1 3.4 - 5.3 mmol/L Final     Chloride   Date Value Ref Range Status   06/06/2022 105 94 - 109 mmol/L Final   04/24/2019 103 94 - 109 mmol/L Final     Carbon Dioxide   Date Value Ref Range Status   04/24/2019 20 20 - 32 mmol/L Final     Carbon Dioxide (CO2)   Date Value Ref Range Status   06/06/2022 26 20 - 32 mmol/L Final     Anion Gap   Date Value Ref Range Status   06/06/2022 5 3 - 14 mmol/L Final   04/24/2019 11 3 - 14 mmol/L Final     Glucose   Date Value Ref Range Status   06/06/2022 269 (H) 70 - 99 mg/dL Final   04/24/2019 263 (H) 70 - 99 mg/dL  Final     Comment:     Fasting specimen     Urea Nitrogen   Date Value Ref Range Status   06/06/2022 31 (H) 7 - 30 mg/dL Final   04/24/2019 21 7 - 30 mg/dL Final     Creatinine   Date Value Ref Range Status   06/06/2022 1.36 (H) 0.66 - 1.25 mg/dL Final   04/24/2019 1.12 0.66 - 1.25 mg/dL Final     GFR Estimate   Date Value Ref Range Status   06/06/2022 62 >60 mL/min/1.73m2 Final     Comment:     Effective December 21, 2021 eGFRcr in adults is calculated using the 2021 CKD-EPI creatinine equation which includes age and gender (Yue et al., NEJ, DOI: 10.1056/LNFMmf8869931)   04/24/2019 75 >60 mL/min/[1.73_m2] Final     Comment:     Non  GFR Calc  Starting 12/18/2018, serum creatinine based estimated GFR (eGFR) will be   calculated using the Chronic Kidney Disease Epidemiology Collaboration   (CKD-EPI) equation.       Calcium   Date Value Ref Range Status   06/06/2022 9.6 8.5 - 10.1 mg/dL Final   04/24/2019 9.0 8.5 - 10.1 mg/dL Final     Bilirubin Total   Date Value Ref Range Status   06/06/2022 0.5 0.2 - 1.3 mg/dL Final   10/24/2014 0.4 0.2 - 1.3 mg/dL Final     Alkaline Phosphatase   Date Value Ref Range Status   06/06/2022 58 40 - 150 U/L Final   10/24/2014 53 40 - 150 U/L Final     ALT   Date Value Ref Range Status   06/06/2022 63 0 - 70 U/L Final   05/24/2018 50 0 - 70 U/L Final     AST   Date Value Ref Range Status   06/06/2022 35 0 - 45 U/L Final   10/24/2014 12 0 - 45 U/L Final     Hypertension: Current medications include atenolol 50 mg once daily, Lisinopril/hydrochlorothiazide 20/12.5 - takes two tablets daily.  Patient does not self-monitor blood pressure.  Patient reports no current medication side effects.  BP Readings from Last 3 Encounters:   06/20/22 121/83   06/06/22 132/76   09/11/21 (!) 140/90       Today's Vitals: /79   Pulse 92   Wt 266 lb 9.6 oz (120.9 kg)   BMI 37.53 kg/m    ----------------      I spent 30 minutes with this patient today. All changes were made via  collaborative practice agreement with CHIQUI Mike CNP. A copy of the visit note was provided to the patient's provider(s).    The patient was given a summary of these recommendations.     Yenifer De La Fuente, PharmD  Medication Therapy Management Pharmacist     Medication Therapy Recommendations  Type 2 diabetes mellitus with hyperglycemia (H)    Current Medication: metFORMIN (GLUCOPHAGE) 500 MG tablet   Rationale: Synergistic therapy - Needs additional medication therapy - Indication   Recommendation: Start Medication - Jardiance 10 MG Tabs   Status: Contact Provider - Awaiting Response

## 2022-11-16 NOTE — PATIENT INSTRUCTIONS
Recommendations from today's MTM visit:                                                       Once we get your kidney function results back we will determine if we can start Jardiance. Keep an eye on mychart result notes. If we cant start Jardiance we will switch Rybelsus to once weekly Ozempic.    2.  If your A1C is not less than 9 in Feb then we should start once daily long acting insulin.    3.  Continue to work on lifestyle changes.     Here are some weight loss resources.     Improving Your Eating Habits  https://www.cdc.gov/healthyweight/losing_weight/eating_habits.html    Apps that can be helpful for weight loss  Noom (subscription cost) - https://www.[employer]/weight-loss/  My Fitness Pal (free version) - https://www.[employer]/    Difference between Noom and My Fitness Pal  - https://www.[employer]ob.[employer]/agfg-ir-lyyjyrtebbpr/    Healthy Eating Tips  Do not drink your calories, except for milk  Track your food intake  Limit the extras to 200 calories per day  Increase water intake  Make non-starchy vegetables the star of your plate  Limit restaurant meals to once per week    Support    Overeaters anonymous - Overeaters Anonymous (OA) is a community of people who support each other in order to recover from compulsive eating and food behaviors. We welcome everyone who feels they have a problem with food. - https://oa.org/    [facility] Comprehensive Weight Management - We offer a comprehensive program that pairs evidence-based medicine with an understanding of your individual goals and lifestyle to create a personalized care plan that works for you. Your dedicated care team includes a provider, dietician, and a health , with referrals available to other care specialists as needed. You ll benefit from our groundbreaking innovations, minimally invasive technologies, and the latest research. With convenient locations across the [place] metropolitan area, you can achieve and sustain your  "weight loss goals. - https://www.[employer].org/treatments/comprehensive-weight-management       Follow-up: Return in 5 weeks (on 12/21/2022) for MTM office visit with Yenifer De La Fuente, Lab Work.    It was great speaking with you today.  I value your experience and would be very thankful for your time in providing feedback in our clinic survey. In the next few days, you may receive an email or text message from [employer] with a link to a survey related to your  clinical pharmacist.\"     To schedule another MTM appointment, please call the clinic directly or you may call the MTM scheduling line at [phone] or toll-free at [phone].     My Clinical Pharmacist's contact information:                                                      Please feel free to contact me with any questions or concerns you have.      Yenifer De La Fuente, PharmD  Medication Therapy Management Pharmacist  [facility] - Monday and Wednesday 7:30 - 4:00  Pager: [phone]    "

## 2022-11-17 RX ORDER — ORAL SEMAGLUTIDE 14 MG/1
TABLET ORAL
Qty: 30 TABLET | Refills: 0 | Status: SHIPPED | OUTPATIENT
Start: 2022-11-17 | End: 2022-12-13

## 2022-11-17 NOTE — TELEPHONE ENCOUNTER
Patient is totally out needs refilled asap. Forgot to ask Yenifer to fill at appt yesterday.    Stephanie SAMUEL  [facility] Clinic/Hospital   [facility]

## 2022-11-17 NOTE — PROGRESS NOTES
Renal function stable, starting Jardiance and placed future BMP to recheck in one month. Sent MyChart result note to patient.    Yenifer De La Fuente, PharmD  Medication Therapy Management Pharmacist

## 2022-11-21 ENCOUNTER — HEALTH MAINTENANCE LETTER (OUTPATIENT)
Age: 57
End: 2022-11-21

## 2022-11-29 DIAGNOSIS — E11.65 TYPE 2 DIABETES MELLITUS WITH HYPERGLYCEMIA, WITHOUT LONG-TERM CURRENT USE OF INSULIN (H): ICD-10-CM

## 2022-11-29 DIAGNOSIS — I10 HYPERTENSION GOAL BP (BLOOD PRESSURE) < 140/90: ICD-10-CM

## 2022-11-29 RX ORDER — GLIPIZIDE 10 MG/1
TABLET, FILM COATED, EXTENDED RELEASE ORAL
Qty: 180 TABLET | Refills: 1 | Status: SHIPPED | OUTPATIENT
Start: 2022-11-29 | End: 2023-04-17

## 2022-11-29 RX ORDER — LISINOPRIL AND HYDROCHLOROTHIAZIDE 12.5; 2 MG/1; MG/1
TABLET ORAL
Qty: 180 TABLET | Refills: 1 | Status: SHIPPED | OUTPATIENT
Start: 2022-11-29 | End: 2023-05-11

## 2022-11-29 NOTE — TELEPHONE ENCOUNTER
Routing refill request to provider for review/approval because:  Labs out of range:  creatinine    Creatinine   Date Value Ref Range Status   11/16/2022 1.39 (H) 0.67 - 1.17 mg/dL Final   04/24/2019 1.12 0.66 - 1.25 mg/dL Final     Stephen FISHER RN 11/29/2022 at 2:38 PM

## 2022-12-02 DIAGNOSIS — E11.65 TYPE 2 DIABETES MELLITUS WITH HYPERGLYCEMIA, WITHOUT LONG-TERM CURRENT USE OF INSULIN (H): ICD-10-CM

## 2022-12-05 RX ORDER — ORAL SEMAGLUTIDE 14 MG/1
TABLET ORAL
Qty: 30 TABLET | Refills: 0 | OUTPATIENT
Start: 2022-12-05

## 2022-12-05 NOTE — TELEPHONE ENCOUNTER
RX refilled on 11/17/22 for Qty of 30 with 0 refills.  Patient is scheduled for an appointment with Dr. Kauffman tomorrow.  Can address future refills at that appointment.  YEMI Galloway RN

## 2022-12-12 ENCOUNTER — ANTICOAGULATION THERAPY VISIT (OUTPATIENT)
Dept: ANTICOAGULATION | Facility: CLINIC | Age: 57
End: 2022-12-12

## 2022-12-12 ENCOUNTER — LAB (OUTPATIENT)
Dept: LAB | Facility: CLINIC | Age: 57
End: 2022-12-12
Payer: COMMERCIAL

## 2022-12-12 DIAGNOSIS — Z86.718 PERSONAL HISTORY OF DVT (DEEP VEIN THROMBOSIS): ICD-10-CM

## 2022-12-12 DIAGNOSIS — Z79.01 LONG TERM CURRENT USE OF ANTICOAGULANTS WITH INR GOAL OF 2.0-3.0: ICD-10-CM

## 2022-12-12 DIAGNOSIS — Z86.711 HISTORY OF PULMONARY EMBOLISM: ICD-10-CM

## 2022-12-12 DIAGNOSIS — Z86.711 HISTORY OF PULMONARY EMBOLISM: Primary | ICD-10-CM

## 2022-12-12 LAB — INR BLD: 2.4 (ref 0.9–1.1)

## 2022-12-12 PROCEDURE — 36416 COLLJ CAPILLARY BLOOD SPEC: CPT

## 2022-12-12 PROCEDURE — 85610 PROTHROMBIN TIME: CPT

## 2022-12-12 NOTE — PROGRESS NOTES
ANTICOAGULATION MANAGEMENT     Schuyler Castro 57 year old male is on warfarin with therapeutic INR result. (Goal INR 2.0-3.0)    Recent labs: (last 7 days)     12/12/22  0747   INR 2.4*       ASSESSMENT       Source(s): Chart Review and Patient/Caregiver Call       Warfarin doses taken: Warfarin taken as instructed    Diet: No new diet changes identified - eating . Reports no changes in vitamin K or protein intake.    New illness, injury, or hospitalization: No    Medication/supplement changes: Jardiance started. No interaction anticipated.    Signs or symptoms of bleeding or clotting: No    Previous INR: Therapeutic last 2(+) visits    Additional findings: Has started a  diet 4+ weeks ago, eating less sugar and has dropped 16 pounds. INR is the same reading today as it was 12 weeks ago.    Plans to schedule colonoscopy in March. Advised to inform [facility] team 2 weeks prior to procedure to allow time for hold plan to be created.       PLAN     Recommended plan for no diet, medication or health factor changes affecting INR     Dosing Instructions: Continue your current warfarin dose with next INR in 12 weeks       Summary  As of 12/12/2022    Full warfarin instructions:  7.5 mg every Tue, Fri; 5 mg all other days; Starting 12/12/2022   Next INR check:  3/6/2023             Telephone call with David who verbalizes understanding and agrees to plan    Lab visit scheduled    Education provided:     Please call back if any changes to your diet, medications or how you've been taking warfarin    Dietary considerations: carbohydrate changes should not impact INR    Importance of notifying anticoagulation clinic for: upcoming surgeries and procedures 2 weeks in advance    Plan made per [facility] anticoagulation protocol    Juju Dockery RN  Anticoagulation Clinic  12/12/2022    _______________________________________________________________________     Anticoagulation Episode Summary     Current INR goal:   2.0-3.0   TTR:  83.7 % (1 y)   Target end date:  Indefinite   Send INR reminders to:  [facility]    Indications    Personal history of DVT (deep vein thrombosis) (Resolved) [Z86.718]  History of pulmonary embolism [Z86.711]  Long term current use of anticoagulants with INR goal of 2.0-3.0 [Z79.01]  Personal history of DVT (deep vein thrombosis) [Z86.718]           Comments:           Anticoagulation Care Providers     Provider Role Specialty Phone number    Rasheeda Juan MD Referring Family Medicine [phone]

## 2022-12-13 RX ORDER — ORAL SEMAGLUTIDE 14 MG/1
14 TABLET ORAL DAILY
Qty: 30 TABLET | Refills: 1 | Status: SHIPPED | OUTPATIENT
Start: 2022-12-13 | End: 2023-01-23

## 2022-12-13 RX ORDER — ORAL SEMAGLUTIDE 14 MG/1
TABLET ORAL
Qty: 30 TABLET | Refills: 0 | Status: CANCELLED | OUTPATIENT
Start: 2022-12-13

## 2022-12-13 NOTE — TELEPHONE ENCOUNTER
See below    Next 5 appointments (look out 90 days)      Dec 21, 2022  8:00 AM  Pharmacist Visit with Cathy De La Fuente RPH  [facility] ([facility] [address] [facility] [address]  [phone]          1/23/23 w/ Jess Kauffman at 8:30 am    Sheron FUNEZ RN

## 2022-12-13 NOTE — TELEPHONE ENCOUNTER
'Use third' is referring to the titration schedule with the dose. Patient started with 3mg daily, then increased to 7 mg daily and now taking 14 mg daily.

## 2022-12-13 NOTE — TELEPHONE ENCOUNTER
"I'm not sure what \"use third\" on Rx means.  Routing to JN for assistance.     Jess Kauffman CNP    "

## 2022-12-21 ENCOUNTER — LAB (OUTPATIENT)
Dept: LAB | Facility: CLINIC | Age: 57
End: 2022-12-21
Payer: COMMERCIAL

## 2022-12-21 ENCOUNTER — OFFICE VISIT (OUTPATIENT)
Dept: PHARMACY | Facility: CLINIC | Age: 57
End: 2022-12-21
Payer: COMMERCIAL

## 2022-12-21 VITALS
DIASTOLIC BLOOD PRESSURE: 70 MMHG | BODY MASS INDEX: 35.81 KG/M2 | SYSTOLIC BLOOD PRESSURE: 116 MMHG | WEIGHT: 254.4 LBS | HEART RATE: 88 BPM

## 2022-12-21 DIAGNOSIS — E11.65 TYPE 2 DIABETES MELLITUS WITH HYPERGLYCEMIA, WITHOUT LONG-TERM CURRENT USE OF INSULIN (H): ICD-10-CM

## 2022-12-21 DIAGNOSIS — E11.65 TYPE 2 DIABETES MELLITUS WITH HYPERGLYCEMIA, WITHOUT LONG-TERM CURRENT USE OF INSULIN (H): Primary | ICD-10-CM

## 2022-12-21 LAB
ANION GAP SERPL CALCULATED.3IONS-SCNC: 15 MMOL/L (ref 7–15)
BUN SERPL-MCNC: 36.3 MG/DL (ref 6–20)
CALCIUM SERPL-MCNC: 9.5 MG/DL (ref 8.6–10)
CHLORIDE SERPL-SCNC: 101 MMOL/L (ref 98–107)
CREAT SERPL-MCNC: 1.56 MG/DL (ref 0.67–1.17)
DEPRECATED HCO3 PLAS-SCNC: 20 MMOL/L (ref 22–29)
GFR SERPL CREATININE-BSD FRML MDRD: 52 ML/MIN/1.73M2
GLUCOSE SERPL-MCNC: 141 MG/DL (ref 70–99)
HOLD SPECIMEN: NORMAL
POTASSIUM SERPL-SCNC: 4.1 MMOL/L (ref 3.4–5.3)
SODIUM SERPL-SCNC: 136 MMOL/L (ref 136–145)

## 2022-12-21 PROCEDURE — 99607 MTMS BY PHARM ADDL 15 MIN: CPT | Performed by: PHARMACIST

## 2022-12-21 PROCEDURE — 36415 COLL VENOUS BLD VENIPUNCTURE: CPT

## 2022-12-21 PROCEDURE — 99606 MTMS BY PHARM EST 15 MIN: CPT | Performed by: PHARMACIST

## 2022-12-21 PROCEDURE — 80048 BASIC METABOLIC PNL TOTAL CA: CPT

## 2022-12-21 NOTE — Clinical Note
His reported blood sugars are much improved since starting Jardiance.  Recheck BMP today to make sure kidney function is stable and creatinine has not increased too much.  If creatinine comes back elevated more than 30% from previous we should look at decreasing his lisinopril hydrochlorothiazide before stopping Jardiance because he really found benefit in Jardiance.  Let me know your thoughts.  Yenifer

## 2022-12-21 NOTE — PATIENT INSTRUCTIONS
"Recommendations from today's MTM visit:                                                       1. Schedule a diabetic eye exam. This should be done every year.    2. Keep medications the same but if you have more low blood sugars let me know. We will want to decrease your glipizide to 10 mg (one tablet) daily.    3. Once your kidney function comes back we will let you know if we need to make changes to her medicines.    4. Keep up all the lifestyle changes. Great job with all you changes!!    Follow-up: Return in 2 months (on 3/6/2023) for MTM office visit with Yenifer De La Fuente, Lab Work.    It was great speaking with you today.  I value your experience and would be very thankful for your time in providing feedback in our clinic survey. In the next few days, you may receive an email or text message from [employer] with a link to a survey related to your  clinical pharmacist.\"     To schedule another MTM appointment, please call the clinic directly or you may call the MTM scheduling line at [phone] or toll-free at [phone].     My Clinical Pharmacist's contact information:                                                      Please feel free to contact me with any questions or concerns you have.      Yenifer De La Fuente, PharmD  Medication Therapy Management Pharmacist  [facility] - Monday and Wednesday 7:30 - 4:00  Pager: [phone]      "

## 2022-12-21 NOTE — PROGRESS NOTES
Medication Therapy Management (MTM) Encounter    ASSESSMENT:                            Medication Adherence/Access: No issues identified    Type 2 Diabetes: Patient is not meeting A1c goal of < 7%. Patient would benefit from continuing his lifestyle changes and current medications.  I did discuss potentially decreasing glipizide today as has some hypoglycemia but patient would like to continue current medications.  I did advise him that if he starts to have more hypoglycemia episodes that he needs to decrease his glipizide to 1 tablet (10 mg) daily.  We did recheck BMP today after starting Jardiance last month.  If his creatinine increases more than 30% we will need to look at either stopping Jardiance or decreasing his lisinopril hydrochlorothiazide and rechecking kidney function.  Patient would prefer NOT to stop Jardiance as he has found this medication very helpful in reducing blood sugars.  His blood pressure was well controlled today so decreasing lisinopril hydrochlorothiazide would be an option.  If we are not able to continue Jardiance for some reason we should consider switching Januvia to a once weekly GLP-1 agonist like Ozempic or start Wegovy for weight loss.  At last visit, we did discuss that if his A1c has not dropped below 9% in February on recheck that we would be starting a once daily basal insulin.  Encouraged him to continue to work on lifestyle changes and weight loss. Due for annual foot and eye exam, he knows he needs to schedule. Microalbumin is not at goal < 30 mg/g. Taking an ACE-inhibitor at max dose.  Aspirin therapy is not indicated in this patient due to anticoagulant/antiplatelet therapy.     PLAN:                            1. Schedule a diabetic eye exam. This should be done every year.    2. Keep medications the same but if you have more low blood sugars let me know. We will want to decrease your glipizide to 10 mg (one tablet) daily.    3. Once your kidney function comes back we  will let you know if we need to make changes to her medicines.    4. Keep up all the lifestyle changes. Great job with all you changes!!    Follow-up: Return in 2 months (on 3/6/2023) for MTM office visit with Yenifer De La Fuente, Lab Work.    SUBJECTIVE/OBJECTIVE:                          Schuyler Castro is a 57 year old male coming in for a follow-up visit.  Today's visit is a follow-up MTM visit from 22.     Reason for visit: diabetes follow-up.    Allergies/ADRs: Reviewed in chart  Past Medical History: Reviewed in chart  Tobacco: He reports that he has never smoked. He has never used smokeless tobacco.  Alcohol: not currently using    Medication Adherence/Access: no issues reported and has been better about not taking anything for 30 minutes after Rybelsus    Type 2 Diabetes:  Currently taking Rybelsus 14 mg once daily - has been better at taking on empty stomach (on this dose since 10/2021), metformin 1000 mg twice daily, glipizide XL 20 mg daily and Jardiance 10 mg once daily (started at last MTM visit).  Patient is not experiencing side effects. He is not interested in using a CGM at this time. He really doesn't want to use injectable medicine.     Blood sugar monitoring: per patient glucose log    Date FBG/ 2hours post Lunch/2hours post Dinner /2hours post     79       118              91       102       94       85  66     94       137  93     134  86     61       59/78       143       97       94  94     106  94     135  115     99  93     113       69       82  73     82  96     116       72  96     95  /180 - 1 hour after meal     95       77          From last MTM visit:  Blood sugar monitoring: Am fastin, 160, 220; 2 hours after lunch: 218 - he did have a low reading yesterday due to light carb lunch.    Symptoms of low blood sugar? Just tired and can tell when it is off  Symptoms of high blood sugar? none  Eye exam: due  Foot exam: due  Diet/Exercise: He cut out all junk food and sweets. He  is counting his carbs and eating no more than 60 grams of carbs per meal and 15 gram carb snack. He is limiting his calories to 500 per meal. He is working out 4 times a week. Sometimes twice a day. He is feeling better and lost weight. He reports his urine is very light color and drinking plenty of water.     Aspirin: Not taking due to warfarin use  Statin: Yes: rosuvastatin 20 mg   ACEi/ARB: Yes: lisinopril 40 mg daily.   Urine Albumin:   Lab Results   Component Value Date    UMALCR 84.84 (H) 06/06/2022      Lab Results   Component Value Date    A1C 10.2 11/16/2022    A1C 10.5 06/06/2022    A1C 10.1 08/18/2021    A1C 9.5 04/24/2019    A1C 8.9 05/24/2018    A1C 6.8 01/23/2017    A1C 6.3 11/03/2015    A1C 6.4 10/24/2014     Last Comprehensive Metabolic Panel:  Sodium   Date Value Ref Range Status   11/16/2022 136 136 - 145 mmol/L Final   04/24/2019 134 133 - 144 mmol/L Final     Potassium   Date Value Ref Range Status   11/16/2022 4.5 3.4 - 5.3 mmol/L Final   06/06/2022 4.8 3.4 - 5.3 mmol/L Final   04/24/2019 4.1 3.4 - 5.3 mmol/L Final     Chloride   Date Value Ref Range Status   11/16/2022 102 98 - 107 mmol/L Final   06/06/2022 105 94 - 109 mmol/L Final   04/24/2019 103 94 - 109 mmol/L Final     Carbon Dioxide   Date Value Ref Range Status   04/24/2019 20 20 - 32 mmol/L Final     Carbon Dioxide (CO2)   Date Value Ref Range Status   11/16/2022 19 (L) 22 - 29 mmol/L Final   06/06/2022 26 20 - 32 mmol/L Final     Anion Gap   Date Value Ref Range Status   11/16/2022 15 7 - 15 mmol/L Final   06/06/2022 5 3 - 14 mmol/L Final   04/24/2019 11 3 - 14 mmol/L Final     Glucose   Date Value Ref Range Status   11/16/2022 256 (H) 70 - 99 mg/dL Final   06/06/2022 269 (H) 70 - 99 mg/dL Final   04/24/2019 263 (H) 70 - 99 mg/dL Final     Comment:     Fasting specimen     Urea Nitrogen   Date Value Ref Range Status   11/16/2022 28.1 (H) 6.0 - 20.0 mg/dL Final   06/06/2022 31 (H) 7 - 30 mg/dL Final   04/24/2019 21 7 - 30 mg/dL Final      Creatinine   Date Value Ref Range Status   11/16/2022 1.39 (H) 0.67 - 1.17 mg/dL Final   04/24/2019 1.12 0.66 - 1.25 mg/dL Final     GFR Estimate   Date Value Ref Range Status   11/16/2022 60 (L) >60 mL/min/1.73m2 Final     Comment:     Effective December 21, 2021 eGFRcr in adults is calculated using the 2021 CKD-EPI creatinine equation which includes age and gender (Yue et al., NE, DOI: 10.1056/WWYYfe3358786)   04/24/2019 75 >60 mL/min/[1.73_m2] Final     Comment:     Non  GFR Calc  Starting 12/18/2018, serum creatinine based estimated GFR (eGFR) will be   calculated using the Chronic Kidney Disease Epidemiology Collaboration   (CKD-EPI) equation.       Calcium   Date Value Ref Range Status   11/16/2022 9.5 8.6 - 10.0 mg/dL Final   04/24/2019 9.0 8.5 - 10.1 mg/dL Final     Bilirubin Total   Date Value Ref Range Status   06/06/2022 0.5 0.2 - 1.3 mg/dL Final   10/24/2014 0.4 0.2 - 1.3 mg/dL Final     Alkaline Phosphatase   Date Value Ref Range Status   06/06/2022 58 40 - 150 U/L Final   10/24/2014 53 40 - 150 U/L Final     ALT   Date Value Ref Range Status   06/06/2022 63 0 - 70 U/L Final   05/24/2018 50 0 - 70 U/L Final     AST   Date Value Ref Range Status   06/06/2022 35 0 - 45 U/L Final   10/24/2014 12 0 - 45 U/L Final     BP Readings from Last 3 Encounters:   12/21/22 116/70   11/16/22 127/79   06/20/22 121/83      Pulse Readings from Last 3 Encounters:   12/21/22 88   11/16/22 92   06/20/22 96     Wt Readings from Last 3 Encounters:   12/21/22 254 lb 6.4 oz (115.4 kg)   11/16/22 266 lb 9.6 oz (120.9 kg)   06/20/22 266 lb 1.6 oz (120.7 kg)     Today's Vitals: /70   Pulse 88   Wt 254 lb 6.4 oz (115.4 kg)   BMI 35.81 kg/m    ----------------    I spent 20 minutes with this patient today. A copy of the visit note was provided to the patient's provider(s).    A summary of these recommendations was sent via [employer].    Yenifer De La Fuente, MarioD  Medication Therapy Management Pharmacist      Medication Therapy Recommendations  No medication therapy recommendations to display

## 2023-01-09 DIAGNOSIS — E11.65 TYPE 2 DIABETES MELLITUS WITH HYPERGLYCEMIA, WITHOUT LONG-TERM CURRENT USE OF INSULIN (H): ICD-10-CM

## 2023-01-11 RX ORDER — EMPAGLIFLOZIN 10 MG/1
TABLET, FILM COATED ORAL
Qty: 30 TABLET | Refills: 0 | Status: SHIPPED | OUTPATIENT
Start: 2023-01-11 | End: 2023-01-23

## 2023-01-11 NOTE — TELEPHONE ENCOUNTER
Prescription approved per [facility] Refill Protocol.  Stephen FISHER RN 1/11/2023 at 2:25 PM

## 2023-01-20 ENCOUNTER — TELEPHONE (OUTPATIENT)
Dept: GASTROENTEROLOGY | Facility: CLINIC | Age: 58
End: 2023-01-20
Payer: COMMERCIAL

## 2023-01-20 NOTE — TELEPHONE ENCOUNTER
Screening Questions  BLUE  KIND OF PREP RED  LOCATION [review exclusion criteria] GREEN  SEDATION TYPE        Y Are you active on mychart?     Jess Kauffman APRN CNP    Ordering/Referring Provider?        MEDICA What type of coverage do you have?      N Have you had a positive covid test in the last 14 days?     36.4 1. BMI  [BMI 40+ - review exclusion criteria]    Y  2. Are you able to give consent for your medical care? [IF NO,RN REVIEW]          N  3. Are you taking any prescription pain medications on a routine schedule   (ex narcotics: tramadol, oxycodone, roxicodone, oxycontin,  and percocet)?        NA  3a. EXTENDED PREP What kind of prescription?     N 4. Do you have any chemical dependencies such as alcohol, street drugs, or methadone?        **If yes 3- 5 , please schedule with MAC sedation.**          IF YES TO ANY 6 - 10 - HOSPITAL SETTING ONLY.     N 6.   Do you need assistance transferring?     N 7.   Have you had a heart or lung transplant?    N 8.   Are you currently on dialysis?   N 9.   Do you use daily home oxygen?   NA 10. Do you take nitroglycerin?   10a. NA If yes, how often?     11. [FEMALES]  N Are you currently pregnant?    11a. NA If yes, how many weeks? [ Greater than 12 weeks, OR NEEDED]    N 12. Do you have Pulmonary Hypertension? *NEED PAC APPT AT [facility]*     N 13. [review exclusion criteria]  Do you have any implantable devices in your body (pacemaker, defib, LVAD)?    N 14. In the past 6 months, have you had any heart related issues including cardiomyopathy or heart attack?     14a. NA If yes, did it require cardiac stenting if so when?     N 15. Have you had a stroke or Transient ischemic attack (TIA - aka  mini stroke ) within 6 months?      N 16. Do you have mod to severe Obstructive Sleep Apnea?  [Hospital only]    N 17. Do you have SEVERE AND UNCONTROLLED asthma? *NEED PAC APPT AT [facility]*     Y, WARFARIN 18. Are you currently taking any blood thinners?     18a. If yes,  "inform patient to \"follow up w/ ordering provider for bridging instructions.\"    N 19. Do you take the medication Phentermine?    19a. If yes, \"Hold for 7 days before procedure.  Please consult your prescribing provider if you have questions about holding this medication.\"     N  20. Do you have chronic kidney disease?      Y  21. Do you have a diagnosis of diabetes?     N  22. On a regular basis do you go 3-5 days between bowel movements?      23. Preferred LOCAL Pharmacy for Pre Prescription    [ LIST ONLY ONE PHARMACY]     [facility] [id number] [place] [facility] [id number] [facility]      - CLOSING REMINDERS -    Informed patient they will need an adult    Cannot take any type of public or medical transportation alone    Conscious Sedation- Needs  for 6 hours after the procedure       MAC/General-Needs  for 24 hours after procedure    Pre-Procedure Covid test to be completed [[facility] PCR Testing Required]    Confirmed Nurse will call to complete assessment       - SCHEDULING DETAILS -  N Hospital Setting Required? If yes, what is the exclusion?: SOFIA LEIGH  Surgeon    5/2  Date of Procedure  Lower Endoscopy [Colonoscopy]  Type of Procedure Scheduled  [facility] Location   STANDARD [facility]-If you answer yes to questions #8, #20, #21Which Colonoscopy Prep was Sent?     MODERATE Sedation Type     N PAC / Pre-op Required                 "

## 2023-01-23 ENCOUNTER — OFFICE VISIT (OUTPATIENT)
Dept: FAMILY MEDICINE | Facility: CLINIC | Age: 58
End: 2023-01-23
Payer: COMMERCIAL

## 2023-01-23 VITALS
HEART RATE: 90 BPM | HEIGHT: 71 IN | OXYGEN SATURATION: 99 % | SYSTOLIC BLOOD PRESSURE: 134 MMHG | TEMPERATURE: 97.8 F | WEIGHT: 246.9 LBS | DIASTOLIC BLOOD PRESSURE: 70 MMHG | RESPIRATION RATE: 20 BRPM | BODY MASS INDEX: 34.56 KG/M2

## 2023-01-23 DIAGNOSIS — Z00.00 ROUTINE GENERAL MEDICAL EXAMINATION AT A HEALTH CARE FACILITY: ICD-10-CM

## 2023-01-23 DIAGNOSIS — E11.65 TYPE 2 DIABETES MELLITUS WITH HYPERGLYCEMIA, WITHOUT LONG-TERM CURRENT USE OF INSULIN (H): ICD-10-CM

## 2023-01-23 DIAGNOSIS — I10 HYPERTENSION GOAL BP (BLOOD PRESSURE) < 140/90: ICD-10-CM

## 2023-01-23 DIAGNOSIS — Z12.11 SCREEN FOR COLON CANCER: ICD-10-CM

## 2023-01-23 DIAGNOSIS — E66.01 MORBID OBESITY (H): ICD-10-CM

## 2023-01-23 LAB
ANION GAP SERPL CALCULATED.3IONS-SCNC: 20 MMOL/L (ref 7–15)
BUN SERPL-MCNC: 31.2 MG/DL (ref 6–20)
CALCIUM SERPL-MCNC: 9.8 MG/DL (ref 8.6–10)
CHLORIDE SERPL-SCNC: 97 MMOL/L (ref 98–107)
CREAT SERPL-MCNC: 1.4 MG/DL (ref 0.67–1.17)
DEPRECATED HCO3 PLAS-SCNC: 18 MMOL/L (ref 22–29)
GFR SERPL CREATININE-BSD FRML MDRD: 59 ML/MIN/1.73M2
GLUCOSE SERPL-MCNC: 151 MG/DL (ref 70–99)
POTASSIUM SERPL-SCNC: 4.4 MMOL/L (ref 3.4–5.3)
SODIUM SERPL-SCNC: 135 MMOL/L (ref 136–145)

## 2023-01-23 PROCEDURE — 99207 PR FOOT EXAM NO CHARGE: CPT | Mod: 25 | Performed by: NURSE PRACTITIONER

## 2023-01-23 PROCEDURE — 80048 BASIC METABOLIC PNL TOTAL CA: CPT | Performed by: NURSE PRACTITIONER

## 2023-01-23 PROCEDURE — 36415 COLL VENOUS BLD VENIPUNCTURE: CPT | Performed by: NURSE PRACTITIONER

## 2023-01-23 PROCEDURE — 99396 PREV VISIT EST AGE 40-64: CPT | Performed by: NURSE PRACTITIONER

## 2023-01-23 PROCEDURE — 99214 OFFICE O/P EST MOD 30 MIN: CPT | Mod: 25 | Performed by: NURSE PRACTITIONER

## 2023-01-23 RX ORDER — ORAL SEMAGLUTIDE 14 MG/1
14 TABLET ORAL DAILY
Qty: 90 TABLET | Refills: 0 | Status: SHIPPED | OUTPATIENT
Start: 2023-01-23 | End: 2023-04-17

## 2023-01-23 RX ORDER — ATENOLOL 50 MG/1
50 TABLET ORAL DAILY
Qty: 90 TABLET | Refills: 3 | Status: CANCELLED | OUTPATIENT
Start: 2023-01-23

## 2023-01-23 ASSESSMENT — PAIN SCALES - GENERAL: PAINLEVEL: NO PAIN (0)

## 2023-01-23 ASSESSMENT — ENCOUNTER SYMPTOMS
DIZZINESS: 0
HEADACHES: 0
FEVER: 0
CHILLS: 0
HEARTBURN: 0
DYSURIA: 0
ABDOMINAL PAIN: 0
WEAKNESS: 0
SHORTNESS OF BREATH: 0
CONSTIPATION: 0
HEMATOCHEZIA: 0
JOINT SWELLING: 0
SORE THROAT: 0
MYALGIAS: 0
NAUSEA: 0
DIARRHEA: 0
FREQUENCY: 0
PALPITATIONS: 0
ARTHRALGIAS: 0
COUGH: 0
EYE PAIN: 0
PARESTHESIAS: 0
HEMATURIA: 0
NERVOUS/ANXIOUS: 0

## 2023-01-23 NOTE — NURSING NOTE
"Chief Complaint   Patient presents with     Physical     Blood Draw     Fasting labs.      Initial /70 (BP Location: Right arm, Patient Position: Sitting, Cuff Size: Adult Large)   Pulse 90   Temp 97.8  F (36.6  C) (Oral)   Resp 20   Ht 1.81 m (5' 11.25\")   Wt 112 kg (246 lb 14.4 oz)   SpO2 99%   BMI 34.19 kg/m   Estimated body mass index is 34.19 kg/m  as calculated from the following:    Height as of this encounter: 1.81 m (5' 11.25\").    Weight as of this encounter: 112 kg (246 lb 14.4 oz).  BP completed using cuff size large right arm    Lisa Magill, CMA    "

## 2023-01-23 NOTE — PROGRESS NOTES
SUBJECTIVE:   CC: David is an 57 year old who presents for preventative health visit.     Patient has been advised of split billing requirements and indicates understanding: Yes  Healthy Habits:     Getting at least 3 servings of Calcium per day:  Yes    Bi-annual eye exam:  NO    Dental care twice a year:  NO    Sleep apnea or symptoms of sleep apnea:  None    Diet:  Diabetic and Carbohydrate counting    Frequency of exercise:  4-5 days/week    Duration of exercise:  45-60 minutes    Taking medications regularly:  Yes    Barriers to taking medications:  None    Medication side effects:  None    PHQ-2 Total Score: 0    Additional concerns today:  No    Diabetes:  Since Nov he has been carb counting and exercising to work on weight loss.  He has been able to lose ~2 pounds a week, total down 20 pounds.  He reports he has been feeling really good.  His morning blood sugars have been running 70-low 100s.   Lab Results   Component Value Date    A1C 10.2 11/16/2022    A1C 10.5 06/06/2022    A1C 10.1 08/18/2021    A1C 9.5 04/24/2019    A1C 8.9 05/24/2018    A1C 6.8 01/23/2017    A1C 6.3 11/03/2015    A1C 6.4 10/24/2014     Has a colonoscopy scheduled for May.   Needs to be get eye exam scheduled and has been trying to get this done.     Today's PHQ-2 Score:   PHQ-2 ( 1999 Pfizer) 1/23/2023   Q1: Little interest or pleasure in doing things 0   Q2: Feeling down, depressed or hopeless 0   PHQ-2 Score 0   PHQ-2 Total Score (12-17 Years)- Positive if 3 or more points; Administer PHQ-A if positive -   Q1: Little interest or pleasure in doing things Not at all   Q2: Feeling down, depressed or hopeless Not at all   PHQ-2 Score 0       Have you ever done Advance Care Planning? (For example, a Health Directive, POLST, or a discussion with a medical provider or your loved ones about your wishes): Yes, patient states has an Advance Care Planning document and will bring a copy to the clinic.    Social History     Tobacco Use      Smoking status: Never     Smokeless tobacco: Never   Substance Use Topics     Alcohol use: Not Currently     Alcohol/week: 0.0 - 1.0 standard drinks     If you drink alcohol do you typically have >3 drinks per day or >7 drinks per week? No    Alcohol Use 1/23/2023   Prescreen: >3 drinks/day or >7 drinks/week? Not Applicable   Prescreen: >3 drinks/day or >7 drinks/week? -   No flowsheet data found.    Last PSA:   PSA   Date Value Ref Range Status   08/24/2010 0.92 0 - 4 ug/L Final       Reviewed orders with patient. Reviewed health maintenance and updated orders accordingly - Yes  Labs reviewed in EPIC  BP Readings from Last 3 Encounters:   01/23/23 134/70   12/21/22 116/70   11/16/22 127/79    Wt Readings from Last 3 Encounters:   01/23/23 112 kg (246 lb 14.4 oz)   12/21/22 115.4 kg (254 lb 6.4 oz)   11/16/22 120.9 kg (266 lb 9.6 oz)                  Current Outpatient Medications   Medication Sig Dispense Refill     Acetaminophen (TYLENOL PO) Take 650 mg by mouth       atenolol (TENORMIN) 50 MG tablet Take 1 tablet (50 mg) by mouth daily 90 tablet 3     blood glucose (NO BRAND SPECIFIED) lancets standard Use to test blood sugar 2-3 times daily or as directed. 90 each 11     blood glucose (NO BRAND SPECIFIED) test strip Use to test blood sugar 2-3 times daily or as directed. 100 each 11     blood glucose monitoring (NO BRAND SPECIFIED) meter device kit Use to test blood sugar 2-3 times daily or as directed. 1 kit 0     glipiZIDE (GLUCOTROL XL) 10 MG 24 hr tablet TAKE 2 TABLETS BY MOUTH EVERY  tablet 1     JARDIANCE 10 MG TABS tablet TAKE 1 TABLET (10 MG) BY MOUTH DAILY. 30 tablet 0     lisinopril-hydrochlorothiazide (ZESTORETIC) 20-12.5 MG tablet TAKE 2 TABLETS BY MOUTH EVERY  tablet 1     metFORMIN (GLUCOPHAGE) 500 MG tablet TAKE 2 TABLETS BY MOUTH 2 TIMES DAILY WITH MEALS 360 tablet 1     rosuvastatin (CRESTOR) 20 MG tablet Take 1 tablet (20 mg) by mouth daily 90 tablet 3     Semaglutide (RYBELSUS)  "14 MG TABS Take 14 mg by mouth daily 30 tablet 1     warfarin ANTICOAGULANT (COUMADIN) 5 MG tablet Take 7.5 mg every Tuesday and Friday. Take 5 mg all other days of the week. Adjust dose based on INR results as directed. 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 No pertinent surgical history.    Review of Systems   Constitutional: Negative for chills and fever.   HENT: Negative for congestion, ear pain, hearing loss and sore throat.    Eyes: Negative for pain and visual disturbance.   Respiratory: Negative for cough and shortness of breath.    Cardiovascular: Negative for chest pain, palpitations and peripheral edema.   Gastrointestinal: Negative for abdominal pain, constipation, diarrhea, heartburn, hematochezia and nausea.   Genitourinary: Negative for dysuria, frequency, genital sores, hematuria, impotence, penile discharge and urgency.   Musculoskeletal: Negative for arthralgias, joint swelling and myalgias.   Skin: Negative for rash.   Neurological: Negative for dizziness, weakness, headaches and paresthesias.   Psychiatric/Behavioral: Negative for mood changes. The patient is not nervous/anxious.        OBJECTIVE:   /70 (BP Location: Right arm, Patient Position: Sitting, Cuff Size: Adult Large)   Pulse 90   Temp 97.8  F (36.6  C) (Oral)   Resp 20   Ht 1.81 m (5' 11.25\")   Wt 112 kg (246 lb 14.4 oz)   SpO2 99%   BMI 34.19 kg/m      Physical Exam  GENERAL: healthy, alert and no distress  EYES: Eyes grossly normal to " inspection, PERRL and conjunctivae and sclerae normal  HENT: ear canals and TM's normal, nose and mouth without ulcers or lesions  NECK: no adenopathy, no asymmetry, masses, or scars and thyroid normal to palpation  RESP: lungs clear to auscultation - no rales, rhonchi or wheezes  CV: regular rate and rhythm, normal S1 S2, no S3 or S4, no murmur, click or rub, no peripheral edema and peripheral pulses strong  ABDOMEN: soft, nontender, no hepatosplenomegaly, no masses and bowel sounds normal  MS: no gross musculoskeletal defects noted, no edema  SKIN: no suspicious lesions or rashes  NEURO: Normal strength and tone, mentation intact and speech normal  PSYCH: mentation appears normal, affect normal/bright  Diabetic foot exam: normal DP and PT pulses, no trophic changes or ulcerative lesions, normal sensory exam, normal monofilament exam and trophic nail changes    Diagnostic Test Results:  Labs reviewed in Epic    ASSESSMENT/PLAN:   1. Routine general medical examination at a health care facility  Reviewed age appropriate screenings and immunizations.  Will plan for shingles vax when he has a day off.     2. Screen for colon cancer  Has colonoscopy scheduled for May.     3. Type 2 diabetes mellitus with hyperglycemia, without long-term current use of insulin (H)  Chronic, has been uncontrolled though today he reports improved fasting sugars.  Continue with lifestyle changes and on current medications.  Has follow-up with MTM in 2 mos for lab recheck which I suspect will be much improved.    - empagliflozin (JARDIANCE) 10 MG TABS tablet; Take 1 tablet (10 mg) by mouth daily  Dispense: 90 tablet; Refill: 0  - Semaglutide (RYBELSUS) 14 MG TABS; Take 14 mg by mouth daily  Dispense: 90 tablet; Refill: 0  - blood glucose (NO BRAND SPECIFIED) lancets standard; Use to test blood sugar 2-3 times daily or as directed.  Dispense: 90 each; Refill: 11  - blood glucose (NO BRAND SPECIFIED) test strip; Use to test blood sugar 2-3  "times daily or as directed.  Dispense: 100 strip; Refill: 11  - FOOT EXAM  - Basic metabolic panel  (Ca, Cl, CO2, Creat, Gluc, K, Na, BUN); Future  - Adult Eye  Referral; Future  - Basic metabolic panel  (Ca, Cl, CO2, Creat, Gluc, K, Na, BUN)    4. Hypertension goal BP (blood pressure) < 140/90  Chronic, controlled.  Continue on current medications.  Recheck BMP today to keep an eye on renal function.     5. Morbid obesity (H)  He has been working hard to lose weight; continue with lifestyle changes.  He is down ~20 pounds today.  Continued weight loss will help improve diabetes and HTN.             COUNSELING:   Reviewed preventive health counseling, as reflected in patient instructions       Regular exercise       Healthy diet/nutrition       Vision screening       Colorectal cancer screening      BMI:   Estimated body mass index is 34.19 kg/m  as calculated from the following:    Height as of this encounter: 1.81 m (5' 11.25\").    Weight as of this encounter: 112 kg (246 lb 14.4 oz).   Weight management plan: Discussed healthy diet and exercise guidelines      He reports that he has never smoked. He has never used smokeless tobacco.            Jess Kauffman, CHIQUI CNP  [facility]  "

## 2023-01-23 NOTE — Clinical Note
David has done a great job with lifestyle changes!  He has lost ~20 pounds and would like to lose another 20.  He reports his morning sugars are running 70-low 100s with an average of low 90s.  I suspect his A1c is going to look much better in march when he sees you.  I did check a BMP today as well to keep an eye on his renal function.    Jess Kauffman CNP

## 2023-01-30 NOTE — TELEPHONE ENCOUNTER
Patient scheduled for 11/05/20    Lyly Florian RN     Clofazimine Counseling:  I discussed with the patient the risks of clofazimine including but not limited to skin and eye pigmentation, liver damage, nausea/vomiting, gastrointestinal bleeding and allergy.

## 2023-02-14 ENCOUNTER — DOCUMENTATION ONLY (OUTPATIENT)
Dept: ANTICOAGULATION | Facility: CLINIC | Age: 58
End: 2023-02-14
Payer: COMMERCIAL

## 2023-02-14 DIAGNOSIS — Z86.718 PERSONAL HISTORY OF DVT (DEEP VEIN THROMBOSIS): ICD-10-CM

## 2023-02-14 DIAGNOSIS — Z86.711 HISTORY OF PULMONARY EMBOLISM: Primary | ICD-10-CM

## 2023-02-14 NOTE — PROGRESS NOTES
ANTICOAGULATION CLINIC REFERRAL RENEWAL REQUEST       An annual renewal order is required for all patients referred to [facility] Anticoagulation [facility].?  Please review and sign the pended referral order for Schuyler Castro.       ANTICOAGULATION SUMMARY      Warfarin indication(s)   DVT and PE    Mechanical heart valve present?  NO       Current goal range   INR: 2.0-3.0     Goal appropriate for indication? Goal INR 2-3, standard for indication(s) above     Time in Therapeutic Range (TTR)  (Goal > 60%) 84%       Office visit with referring provider's group within last year yes on 01/23/2023       Lyly Florian RN  [facility]agulation [facility]

## 2023-02-27 ENCOUNTER — TELEPHONE (OUTPATIENT)
Dept: GASTROENTEROLOGY | Facility: CLINIC | Age: 58
End: 2023-02-27
Payer: COMMERCIAL

## 2023-02-27 NOTE — TELEPHONE ENCOUNTER
Caller: Schuyler Castro    Reason for Reschedule/Cancellation (please be detailed, any staff messages or encounters to note?): going on trip      Prior to reschedule please review:    Ordering Provider:STRONG    Sedation per order:MODERATE    Does patient have any ASC Exclusions, please identify?: NO      Notes on Cancelled Procedure:    Procedure:Lower Endoscopy [Colonoscopy]     Date: 5/2    Location:[facility] [address] [place] [address]    Surgeon: REESE        Rescheduled: Yes    Procedure: Lower Endoscopy [Colonoscopy]     Date: 5/25    Location[place]; [facility] [place] [address]    Surgeon: JERICA    Sedation Level Scheduled  MODERATE,  Reason for Sedation Level PER ORDER/SCREENING    Prep/Instructions updated and sent: YES

## 2023-03-06 ENCOUNTER — ANTICOAGULATION THERAPY VISIT (OUTPATIENT)
Dept: ANTICOAGULATION | Facility: CLINIC | Age: 58
End: 2023-03-06

## 2023-03-06 ENCOUNTER — LAB (OUTPATIENT)
Dept: LAB | Facility: CLINIC | Age: 58
End: 2023-03-06
Payer: COMMERCIAL

## 2023-03-06 DIAGNOSIS — Z86.711 HISTORY OF PULMONARY EMBOLISM: ICD-10-CM

## 2023-03-06 DIAGNOSIS — Z86.718 PERSONAL HISTORY OF DVT (DEEP VEIN THROMBOSIS): ICD-10-CM

## 2023-03-06 LAB — INR BLD: 1.7 (ref 0.9–1.1)

## 2023-03-06 PROCEDURE — 85610 PROTHROMBIN TIME: CPT

## 2023-03-06 PROCEDURE — 36416 COLLJ CAPILLARY BLOOD SPEC: CPT

## 2023-03-06 NOTE — PROGRESS NOTES
ANTICOAGULATION MANAGEMENT     Schuyler Castro 57 year old male is on warfarin with subtherapeutic INR result. (Goal INR 2.0-3.0)    Recent labs: (last 7 days)     03/06/23  0742   INR 1.7*       ASSESSMENT       Source(s): Chart Review and Patient/Caregiver Call       Warfarin doses taken: Warfarin taken as instructed    Diet: pt is trying to lose weight may be affecting diet and INR    New illness, injury, or hospitalization: No    Medication/supplement changes: None noted    Signs or symptoms of bleeding or clotting: No    Previous INR: Therapeutic last 2(+) visits    Additional findings: Pt has lost 30 pounds since November 2022 per report. Pt has increased activity and increased eating healthier foods.         PLAN     Recommended plan for ongoing change(s) affecting INR     Dosing Instructions: booster dose then Increase your warfarin dose (6.2% change) with next INR in 2 weeks       Summary  As of 3/6/2023    Full warfarin instructions:  3/6: 10 mg; Otherwise 7.5 mg every Mon, Wed, Fri; 5 mg all other days   Next INR check:  3/23/2023             Telephone call with David who agrees to plan and repeated back plan correctly  Sent [employer] message with dosing and follow up instructions    Lab visit scheduled    Education provided:     Please call back if any changes to your diet, medications or how you've been taking warfarin    Contact [phone]  with any changes, questions or concerns.     Plan made per [facility] anticoagulation protocol    Brenda Chaves, RN  Anticoagulation Clinic  3/6/2023    _______________________________________________________________________     Anticoagulation Episode Summary     Current INR goal:  2.0-3.0   TTR:  86.5 % (1 y)   Target end date:  Indefinite   Send INR reminders to:  CASEY GARCIA    Indications    Personal history of DVT (deep vein thrombosis) (Resolved) [Z86.718]  History of pulmonary embolism [Z86.711]  Long term current use of anticoagulants with INR goal of  2.0-3.0 [Z79.01]  Personal history of DVT (deep vein thrombosis) [Z86.718]           Comments:           Anticoagulation Care Providers     Provider Role Specialty Phone number    Rasheeda Juan MD Referring Family Medicine [phone]    Jess Kauffman APRN CNP Referring Family Medicine [phone]

## 2023-03-23 ENCOUNTER — LAB (OUTPATIENT)
Dept: LAB | Facility: CLINIC | Age: 58
End: 2023-03-23
Payer: COMMERCIAL

## 2023-03-23 ENCOUNTER — ANTICOAGULATION THERAPY VISIT (OUTPATIENT)
Dept: ANTICOAGULATION | Facility: CLINIC | Age: 58
End: 2023-03-23

## 2023-03-23 DIAGNOSIS — Z86.718 PERSONAL HISTORY OF DVT (DEEP VEIN THROMBOSIS): ICD-10-CM

## 2023-03-23 DIAGNOSIS — Z86.711 HISTORY OF PULMONARY EMBOLISM: ICD-10-CM

## 2023-03-23 DIAGNOSIS — Z86.711 HISTORY OF PULMONARY EMBOLISM: Primary | ICD-10-CM

## 2023-03-23 DIAGNOSIS — Z79.01 LONG TERM CURRENT USE OF ANTICOAGULANTS WITH INR GOAL OF 2.0-3.0: ICD-10-CM

## 2023-03-23 LAB — INR BLD: 1.7 (ref 0.9–1.1)

## 2023-03-23 PROCEDURE — 36416 COLLJ CAPILLARY BLOOD SPEC: CPT

## 2023-03-23 PROCEDURE — 85610 PROTHROMBIN TIME: CPT

## 2023-03-23 NOTE — PROGRESS NOTES
ANTICOAGULATION MANAGEMENT     Schuyler Castro 57 year old male is on warfarin with subtherapeutic INR result. (Goal INR 2.0-3.0)    Recent labs: (last 7 days)     03/23/23  0738   INR 1.7*       ASSESSMENT       Source(s): Chart Review and Patient/Caregiver Call       Warfarin doses taken: Warfarin taken as instructed    Diet: Continues to eat increased green veggies, but feels that diet has been consistent.    New illness, injury, or hospitalization: No    Medication/supplement changes: None noted    Signs or symptoms of bleeding or clotting: No    Previous INR: Subtherapeutic.  Maintenance dose was increased about 6% at last INR check    Additional findings: continues to exercise frequently.         PLAN     Recommended plan for ongoing change(s) affecting INR     Dosing Instructions: booster dose then Increase your warfarin dose (6% change) with next INR in 2 weeks       Summary  As of 3/23/2023    Full warfarin instructions:  3/23: 10 mg; Otherwise 5 mg every Tue, Thu, Sat; 7.5 mg all other days   Next INR check:  4/7/2023             Telephone call with David who agrees to plan and repeated back plan correctly    Lab visit scheduled    Education provided:     Please call back if any changes to your diet, medications or how you've been taking warfarin    Plan made per [facility] anticoagulation protocol    Violeta Sanchez RN  Anticoagulation Clinic  3/23/2023    _______________________________________________________________________     Anticoagulation Episode Summary     Current INR goal:  2.0-3.0   TTR:  85.5 % (1 y)   Target end date:  Indefinite   Send INR reminders to:  CASEY GARCIA    Indications    Personal history of DVT (deep vein thrombosis) (Resolved) [Z86.718]  History of pulmonary embolism [Z86.711]  Long term current use of anticoagulants with INR goal of 2.0-3.0 [Z79.01]  Personal history of DVT (deep vein thrombosis) [Z86.718]           Comments:           Anticoagulation Care Providers      Provider Role Specialty Phone number    Rasheeda Juan MD Referring Family Medicine [phone]    Jess Kauffman APRN CNP Referring Family Medicine [phone]

## 2023-04-07 ENCOUNTER — TELEPHONE (OUTPATIENT)
Dept: ANTICOAGULATION | Facility: CLINIC | Age: 58
End: 2023-04-07

## 2023-04-07 ENCOUNTER — LAB (OUTPATIENT)
Dept: LAB | Facility: CLINIC | Age: 58
End: 2023-04-07
Payer: COMMERCIAL

## 2023-04-07 ENCOUNTER — ANTICOAGULATION THERAPY VISIT (OUTPATIENT)
Dept: ANTICOAGULATION | Facility: CLINIC | Age: 58
End: 2023-04-07

## 2023-04-07 DIAGNOSIS — Z86.718 PERSONAL HISTORY OF DVT (DEEP VEIN THROMBOSIS): ICD-10-CM

## 2023-04-07 DIAGNOSIS — Z79.01 LONG TERM CURRENT USE OF ANTICOAGULANTS WITH INR GOAL OF 2.0-3.0: ICD-10-CM

## 2023-04-07 DIAGNOSIS — I26.99 OTHER ACUTE PULMONARY EMBOLISM WITHOUT ACUTE COR PULMONALE (H): ICD-10-CM

## 2023-04-07 DIAGNOSIS — Z86.711 HISTORY OF PULMONARY EMBOLISM: Primary | ICD-10-CM

## 2023-04-07 DIAGNOSIS — Z86.711 HISTORY OF PULMONARY EMBOLISM: ICD-10-CM

## 2023-04-07 LAB — INR BLD: 2.1 (ref 0.9–1.1)

## 2023-04-07 PROCEDURE — 36416 COLLJ CAPILLARY BLOOD SPEC: CPT

## 2023-04-07 PROCEDURE — 85610 PROTHROMBIN TIME: CPT

## 2023-04-07 RX ORDER — WARFARIN SODIUM 5 MG/1
TABLET ORAL
Qty: 115 TABLET | Refills: 1 | Status: SHIPPED | OUTPATIENT
Start: 2023-04-07 | End: 2023-07-31

## 2023-04-07 NOTE — TELEPHONE ENCOUNTER
David is scheduled for colonoscopy on 5/25/23     Patient is currently on Warfarin for PE and DVT     Procedure is scheduled with Dr. Nina Menjivar at [facility]    Thank you,  Lyly Florian RN

## 2023-04-07 NOTE — PROGRESS NOTES
ANTICOAGULATION MANAGEMENT     Schuyler Castro 57 year old male is on warfarin with therapeutic INR result. (Goal INR 2.0-3.0)    Recent labs: (last 7 days)     04/07/23  0744   INR 2.1*       ASSESSMENT       Source(s): Chart Review and Patient/Caregiver Call       Warfarin doses taken: Warfarin taken as instructed    Diet: No new diet changes identified--patient states he continues to eat healthier but no change in vitamin K intake.    New illness, injury, or hospitalization: No    Medication/supplement changes: None noted    Signs or symptoms of bleeding or clotting: No    Previous INR: Subtherapeutic    Additional findings: warfarin maintenance dose increased at last 2 visits. Patient states he is continuing to lose weight and plans to be more active as the weather improves.    Patient needs warfarin refilled, all requirements met.    Colonoscopy scheduled for 5/25/23, encounter routed to [facility]         PLAN     Recommended plan for ongoing change(s) affecting INR     Dosing Instructions: Continue your current warfarin dose with next INR in 3 weeks       Summary  As of 4/7/2023    Full warfarin instructions:  5 mg every Tue, Thu, Sat; 7.5 mg all other days   Next INR check:  5/2/2023             Telephone call with David who verbalizes understanding and agrees to plan    Lab visit scheduled    Education provided:     Dietary considerations: importance of consistent vitamin K intake    how increased activity can affect INR    Plan made per [facility] anticoagulation protocol    Lyly Florian, RN  Anticoagulation Clinic  4/7/2023    _______________________________________________________________________     Anticoagulation Episode Summary     Current INR goal:  2.0-3.0   TTR:  82.4 % (1 y)   Target end date:  Indefinite   Send INR reminders to:  CASEY GARCIA    Indications    Personal history of DVT (deep vein thrombosis) (Resolved) [Z86.718]  History of pulmonary embolism [Z86.711]  Long term current use of  anticoagulants with INR goal of 2.0-3.0 [Z79.01]  Personal history of DVT (deep vein thrombosis) [Z86.718]           Comments:  6/7/21-OK for 12 week INR monitoring         Anticoagulation Care Providers     Provider Role Specialty Phone number    Rasheeda Juan MD Referring Family Medicine [phone]    Jess Kauffman APRN CNP Referring Family Medicine [phone]

## 2023-04-14 DIAGNOSIS — E11.65 TYPE 2 DIABETES MELLITUS WITH HYPERGLYCEMIA, WITHOUT LONG-TERM CURRENT USE OF INSULIN (H): Primary | ICD-10-CM

## 2023-04-14 NOTE — PROGRESS NOTES
Ordered labs per collaborative practice agreement for upcoming appointment. Jess Kauffman agrees to recheck BMP too just to make sure no issues with renal function/meds.     Yenifer De La Fuente, PharmD  Medication Therapy Management Pharmacist

## 2023-04-17 ENCOUNTER — OFFICE VISIT (OUTPATIENT)
Dept: PHARMACY | Facility: CLINIC | Age: 58
End: 2023-04-17
Payer: COMMERCIAL

## 2023-04-17 ENCOUNTER — LAB (OUTPATIENT)
Dept: LAB | Facility: CLINIC | Age: 58
End: 2023-04-17
Payer: COMMERCIAL

## 2023-04-17 VITALS
OXYGEN SATURATION: 98 % | WEIGHT: 218.1 LBS | SYSTOLIC BLOOD PRESSURE: 119 MMHG | DIASTOLIC BLOOD PRESSURE: 74 MMHG | HEART RATE: 82 BPM | BODY MASS INDEX: 30.21 KG/M2

## 2023-04-17 DIAGNOSIS — E11.9 DIABETES MELLITUS, TYPE 2 (H): Primary | ICD-10-CM

## 2023-04-17 DIAGNOSIS — E11.65 TYPE 2 DIABETES MELLITUS WITH HYPERGLYCEMIA, WITHOUT LONG-TERM CURRENT USE OF INSULIN (H): ICD-10-CM

## 2023-04-17 DIAGNOSIS — I10 HYPERTENSION GOAL BP (BLOOD PRESSURE) < 140/90: ICD-10-CM

## 2023-04-17 DIAGNOSIS — E78.5 HYPERLIPIDEMIA LDL GOAL <100: ICD-10-CM

## 2023-04-17 DIAGNOSIS — Z86.718 PERSONAL HISTORY OF DVT (DEEP VEIN THROMBOSIS): ICD-10-CM

## 2023-04-17 LAB
ANION GAP SERPL CALCULATED.3IONS-SCNC: 16 MMOL/L (ref 7–15)
BUN SERPL-MCNC: 38.6 MG/DL (ref 6–20)
CALCIUM SERPL-MCNC: 9.9 MG/DL (ref 8.6–10)
CHLORIDE SERPL-SCNC: 98 MMOL/L (ref 98–107)
CREAT SERPL-MCNC: 1.7 MG/DL (ref 0.67–1.17)
DEPRECATED HCO3 PLAS-SCNC: 23 MMOL/L (ref 22–29)
GFR SERPL CREATININE-BSD FRML MDRD: 47 ML/MIN/1.73M2
GLUCOSE SERPL-MCNC: 110 MG/DL (ref 70–99)
HBA1C MFR BLD: 5.2 % (ref 0–5.6)
POTASSIUM SERPL-SCNC: 4.3 MMOL/L (ref 3.4–5.3)
SODIUM SERPL-SCNC: 137 MMOL/L (ref 136–145)

## 2023-04-17 PROCEDURE — 99207 PR NO CHARGE LOS: CPT | Performed by: PHARMACIST

## 2023-04-17 PROCEDURE — 83036 HEMOGLOBIN GLYCOSYLATED A1C: CPT

## 2023-04-17 PROCEDURE — 80048 BASIC METABOLIC PNL TOTAL CA: CPT

## 2023-04-17 PROCEDURE — 36415 COLL VENOUS BLD VENIPUNCTURE: CPT

## 2023-04-17 RX ORDER — GLIPIZIDE 10 MG/1
10 TABLET, FILM COATED, EXTENDED RELEASE ORAL DAILY
Qty: 90 TABLET | Refills: 1 | Status: SHIPPED | OUTPATIENT
Start: 2023-04-17 | End: 2023-09-27 | Stop reason: DRUGHIGH

## 2023-04-17 RX ORDER — ORAL SEMAGLUTIDE 14 MG/1
14 TABLET ORAL DAILY
Qty: 90 TABLET | Refills: 0 | Status: SHIPPED | OUTPATIENT
Start: 2023-04-17 | End: 2023-07-12

## 2023-04-17 NOTE — Clinical Note
His A1C dropped by 5%!  He has been having a lot of lows so pushing A1C lower. I wanted to stop glipizide today but he was hesitant so we cut it in half and will work to decrease slowly over the next few months and hoping he is off when I follow up in August. He has also lost 28 pounds since he saw you about 3 months ago with diet and exercise. I congratulated him and reminded him to make sure he is eating a well balanced diet to include some crabs. Let me know if you agree with this plan.  Yenifer

## 2023-04-17 NOTE — PATIENT INSTRUCTIONS
"Recommendations from today's MTM visit:                                                       1. Decrease the glipizide to 10 mg once daily. If you continue to have blood sugars less than 70 at anytime during the day let me know and we will continue to decrease the glipizide.    2. Keep working on your lifestyle changes. You are doing great!!    Follow-up: Return in 4 months (on 8/16/2023) for MTM office visit with Yenifer De La Fuente, Lab Work.    It was great speaking with you today.  I value your experience and would be very thankful for your time in providing feedback in our clinic survey. In the next few days, you may receive an email or text message from [facility] [employer] with a link to a survey related to your  clinical pharmacist.\"     To schedule another MTM appointment, please call the clinic directly or you may call the MTM scheduling line at [phone] or toll-free at [phone].     My Clinical Pharmacist's contact information:                                                      Please feel free to contact me with any questions or concerns you have.      Yenifer De La Fuente, PharmD  Medication Therapy Management Pharmacist    "

## 2023-04-17 NOTE — PROGRESS NOTES
Medication Therapy Management (MTM) Encounter    ASSESSMENT:                            Medication Adherence/Access: No issues identified    Type 2 Diabetes: Patient is meeting A1c goal of < 7%. With his frequent hypoglycemia he would benefit from stopping glipizide today. He would prefer to reduce the dose gradually and discontinue within the next three months. I agreed to this and told him he should be having a low blood sugar no more than every couple weeks. If he continues to have hypoglycemia that he should let me know and we need to decrease his glipizide further or stop depending on how often he is having episodes. He agrees to this plan. He is aware of symptoms and knows how to treat hypoglycemia. His renal function is stable since starting SGLT-2 and rechecked today to ensure it remains stable with all his weight loss. Encouraged him to continue to work on lifestyle changes and weight loss. Congratulated him on his work thus far. Reminded him to ensure he is eating a well balanced diet to include some crabs. Due for annual foot and eye exam, he knows he needs to schedule. Microalbumin is not at goal < 30 mg/g, will recheck at next visit order placed today. Taking an ACE-inhibitor at max dose and on SGLT-2 inhibitor not at max dose.  Aspirin therapy is not indicated in this patient due to anticoagulant/antiplatelet therapy.     Hypertension: Stable. Patient is meeting blood pressure goal of < 140/90mmHg.    Hyperlipidemia: Patient is on high intensity statin which is indicated based on 2019 ACC/AHA guidelines for lipid management.  Will recheck FLP at next visit, ordered today.    History of DVT/PE: Stable, following with anticoagulation clinic.    PLAN:                            1. Decrease the glipizide to 10 mg once daily. If you continue to have blood sugars less than 70 at anytime during the day let me know and we will continue to decrease the glipizide.    2. Keep working on your lifestyle changes.  You are doing great!!    Follow-up: Return in 4 months (on 8/16/2023) for MTM office visit with Yenifer De La Fuente, Lab Work.    SUBJECTIVE/OBJECTIVE:                          Schuyler Castro is a 57 year old male coming in for a follow-up visit.  Today's visit is a follow-up MTM visit from 12/21/22.     Reason for visit: diabetes follow-up and comprehensive med review with first visit of the year.    Allergies/ADRs: Reviewed in chart  Past Medical History: Reviewed in chart  Tobacco: He reports that he has never smoked. He has never used smokeless tobacco.  Alcohol: not currently using    Medication Adherence/Access: no issues reported    Type 2 Diabetes:  Currently taking Rybelsus 14 mg once daily - has been better at taking on empty stomach (on this dose since 10/2021), metformin 1000 mg twice daily, glipizide XL 20 mg daily and Jardiance 10 mg once daily.  Patient is not experiencing side effects. He is not interested in using a CGM at this time. He really doesn't want to use injectable medicine.     Blood sugar monitoring: per patient glucose log              From last MTM visit:  Date FBG/ 2hours post Lunch/2hours post Dinner /2hours post    12/21 79       118              91       102       94       85  66     94       137  93     134  86     61       59/78       143       97       94  94     106  94     135  115     99  93     113       69       82  73     82  96     116       72  96     95  /180 - 1 hour after meal     95       77        Symptoms of low blood sugar? Just tired and can tell when it is off. When low he will eat 15 grams of sugar, usually a granola bar.  Symptoms of high blood sugar? none  Eye exam: due  Foot exam: up to date  Diet/Exercise: Reports he has been continuing his lifestyle changes. He continues to avoid all junk food and sweets. He is counting his carbs and eating less than 60 grams of carbs per meal and 15 gram carb snack. He is working out 3-5 times a week. He has continued to lose  weight.     Aspirin: Not taking due to warfarin use  Statin: Yes: rosuvastatin 20 mg   ACEi/ARB: Yes: lisinopril 40 mg daily.   Urine Albumin:   Lab Results   Component Value Date    UMALCR 84.84 (H) 06/06/2022    UMALCR 10.25 08/18/2021    UMALCR 8.40 04/24/2019    UMALCR 7.42 05/24/2018    UMALCR 9.86 01/23/2017     Lab Results   Component Value Date    A1C 5.2 04/17/2023    A1C 10.2 11/16/2022    A1C 10.5 06/06/2022    A1C 10.1 08/18/2021    A1C 9.5 04/24/2019    A1C 8.9 05/24/2018    A1C 6.8 01/23/2017    A1C 6.3 11/03/2015    A1C 6.4 10/24/2014     Creatinine   Date Value Ref Range Status   01/23/2023 1.40 (H) 0.67 - 1.17 mg/dL Final   12/21/2022 1.56 (H) 0.67 - 1.17 mg/dL Final   11/16/2022 1.39 (H) 0.67 - 1.17 mg/dL Final   06/06/2022 1.36 (H) 0.66 - 1.25 mg/dL Final   08/18/2021 1.40 (H) 0.66 - 1.25 mg/dL Final   04/24/2019 1.12 0.66 - 1.25 mg/dL Final   05/24/2018 1.07 0.66 - 1.25 mg/dL Final   01/23/2017 1.16 0.66 - 1.25 mg/dL Final   11/03/2015 1.02 0.66 - 1.25 mg/dL Final   01/06/2015 1.11 0.66 - 1.25 mg/dL Final     Hypertension: Current medications include atenolol 50 mg daily and lisinopril/hctz 20/12.5 mg takes two daily.  Patient does not self-monitor blood pressure.  Patient reports no current medication side effects.    BP Readings from Last 3 Encounters:   04/17/23 119/74   01/23/23 134/70   12/21/22 116/70      Pulse Readings from Last 3 Encounters:   04/17/23 82   01/23/23 90   12/21/22 88     Wt Readings from Last 3 Encounters:   04/17/23 218 lb 1.6 oz (98.9 kg)   01/23/23 246 lb 14.4 oz (112 kg)   12/21/22 254 lb 6.4 oz (115.4 kg)     Hyperlipidemia: Current therapy includes rosuvastatin 20mg daily.  Patient reports no significant myalgias or other side effects.    Recent Labs   Lab Test 06/06/22  0849 08/18/21  0950 01/23/17  0923 11/03/15  0734   CHOL 94 188   < > 148   HDL 40 46   < > 40*   LDL 24 101*   < > 78   TRIG 151* 203*   < > 151*   CHOLHDLRATIO  --   --   --  3.7    < > = values  in this interval not displayed.     History of DVT/PE:  Current medications include: warfarin 5 mg three days a week and 7.5 mg rest of week. Patient reports no unusually bleeding or bruising. He follows with the anticoagulation clinic.    INR   Date Value Ref Range Status   04/07/2023 2.1 (H) 0.9 - 1.1 Final   06/07/2021 2.40 (H) 0.86 - 1.14 Final     Comment:     This test is intended for monitoring Coumadin therapy.  Results are not   accurate in patients with prolonged INR due to factor deficiency.          Today's Vitals: /74   Pulse 82   Wt 218 lb 1.6 oz (98.9 kg)   SpO2 98%   BMI 30.21 kg/m    ----------------    I spent 20 minutes with this patient today. All changes were made via collaborative practice agreement with CHIQUI Mike CNP. A copy of the visit note was provided to the patient's provider(s).    A summary of these recommendations was sent via [employer].    Yenifer De La Fuente, PharmD  Medication Therapy Management Pharmacist     Medication Therapy Recommendations  Diabetes mellitus, type 2 (H)    Current Medication: glipiZIDE (GLUCOTROL XL) 10 MG 24 hr tablet (Discontinued)   Rationale: Undesirable effect - Adverse medication event - Safety   Recommendation: Decrease Dose - glipiZIDE 10 MG tablet   Status: Accepted per CPA   Note: 10 mg daily but will decrease further if he continues to have hypoglycemia

## 2023-04-18 DIAGNOSIS — E11.65 TYPE 2 DIABETES MELLITUS WITH HYPERGLYCEMIA, WITHOUT LONG-TERM CURRENT USE OF INSULIN (H): Primary | ICD-10-CM

## 2023-04-18 NOTE — PROGRESS NOTES
With increase in creatinine, will recheck bmp in a couple weeks. Future order placed per verbal order from Jess Kauffman CNP.    Yenifer De La Fuente, PharmD  Medication Therapy Management Pharmacist

## 2023-05-01 ENCOUNTER — ANTICOAGULATION THERAPY VISIT (OUTPATIENT)
Dept: ANTICOAGULATION | Facility: CLINIC | Age: 58
End: 2023-05-01

## 2023-05-01 ENCOUNTER — LAB (OUTPATIENT)
Dept: LAB | Facility: CLINIC | Age: 58
End: 2023-05-01
Payer: COMMERCIAL

## 2023-05-01 DIAGNOSIS — Z86.718 PERSONAL HISTORY OF DVT (DEEP VEIN THROMBOSIS): ICD-10-CM

## 2023-05-01 DIAGNOSIS — Z79.01 LONG TERM CURRENT USE OF ANTICOAGULANTS WITH INR GOAL OF 2.0-3.0: ICD-10-CM

## 2023-05-01 DIAGNOSIS — Z86.711 HISTORY OF PULMONARY EMBOLISM: Primary | ICD-10-CM

## 2023-05-01 DIAGNOSIS — E11.65 TYPE 2 DIABETES MELLITUS WITH HYPERGLYCEMIA, WITHOUT LONG-TERM CURRENT USE OF INSULIN (H): ICD-10-CM

## 2023-05-01 DIAGNOSIS — Z86.711 HISTORY OF PULMONARY EMBOLISM: ICD-10-CM

## 2023-05-01 LAB — INR BLD: 2.4 (ref 0.9–1.1)

## 2023-05-01 PROCEDURE — 85610 PROTHROMBIN TIME: CPT

## 2023-05-01 PROCEDURE — 36416 COLLJ CAPILLARY BLOOD SPEC: CPT

## 2023-05-01 NOTE — PROGRESS NOTES
ANTICOAGULATION MANAGEMENT     Schuyler Castro 57 year old male is on warfarin with therapeutic INR result. (Goal INR 2.0-3.0)    Recent labs: (last 7 days)     05/01/23  0810   INR 2.4*       ASSESSMENT       Source(s): Chart Review       Warfarin doses taken: Warfarin taken as instructed    Diet: No new diet changes identified    Medication/supplement changes: Lesser glipizide dosage. No interaction anticipated.    New illness, injury, or hospitalization: No    Signs or symptoms of bleeding or clotting: No    Previous result: Therapeutic last visit; previously outside of goal range    Additional findings: Colonoscopy 5/25/23. Hold plan sent to [facility] on 4/7/23. David can't come back in 5-7 days after procedure but can return 8 days after, so scheduled next INR then.    David will now be utilizing the [place] lab for his INRs.         PLAN     Recommended plan for no diet, medication or health factor changes affecting INR     Dosing Instructions: Continue your current warfarin dose with next INR in 5 weeks   (due to upcoming procedure)    Summary  As of 5/1/2023    Full warfarin instructions:  5 mg every Tue, Thu, Sat; 7.5 mg all other days   Next INR check:  6/2/2023             Telephone call with David who verbalizes understanding and agrees to plan    Lab visit scheduled    Education provided:     Please call back if any changes to your diet, medications or how you've been taking warfarin     Will be contacted in the next couple weeks regarding hold/bridge plan for upcoming procedure    Plan made per [facility] anticoagulation protocol    Juju Dockery RN  Anticoagulation Clinic  5/1/2023    _______________________________________________________________________     Anticoagulation Episode Summary     Current INR goal:  2.0-3.0   TTR:  82.4 % (1 y)   Target end date:  Indefinite   Send INR reminders to:  CASEY GARCIA    Indications    Personal history of DVT (deep vein thrombosis) (Resolved)  [Z86.718]  History of pulmonary embolism [Z86.711]  Long term current use of anticoagulants with INR goal of 2.0-3.0 [Z79.01]  Personal history of DVT (deep vein thrombosis) [Z86.718]           Comments:  6/7/21-OK for 12 week INR monitoring         Anticoagulation Care Providers     Provider Role Specialty Phone number    Rasheeda Juan MD Referring Family Medicine [phone]    Jess Kauffman APRN CNP Referring Family Medicine [phone]

## 2023-05-10 DIAGNOSIS — I10 HYPERTENSION GOAL BP (BLOOD PRESSURE) < 140/90: ICD-10-CM

## 2023-05-10 DIAGNOSIS — E11.65 TYPE 2 DIABETES MELLITUS WITH HYPERGLYCEMIA, WITHOUT LONG-TERM CURRENT USE OF INSULIN (H): ICD-10-CM

## 2023-05-11 RX ORDER — ATENOLOL 50 MG/1
TABLET ORAL
Qty: 90 TABLET | Refills: 0 | Status: SHIPPED | OUTPATIENT
Start: 2023-05-11 | End: 2023-07-10

## 2023-05-11 RX ORDER — LISINOPRIL AND HYDROCHLOROTHIAZIDE 12.5; 2 MG/1; MG/1
TABLET ORAL
Qty: 180 TABLET | Refills: 0 | Status: SHIPPED | OUTPATIENT
Start: 2023-05-11 | End: 2023-07-12

## 2023-05-11 RX ORDER — BISACODYL 5 MG/1
TABLET, DELAYED RELEASE ORAL
Qty: 4 TABLET | Refills: 0 | Status: SHIPPED | OUTPATIENT
Start: 2023-05-11 | End: 2023-05-25

## 2023-05-11 RX ORDER — GLIPIZIDE 10 MG/1
TABLET, FILM COATED, EXTENDED RELEASE ORAL
Qty: 180 TABLET | Refills: 1 | OUTPATIENT
Start: 2023-05-11

## 2023-05-11 NOTE — TELEPHONE ENCOUNTER
Pt has dose decrease, see 4/17/23 rx sent for one per day, has 6 month rx that was sent, will extend BP as pt has appointment scheduled  Prescription approved per [facility] Refill Protocol.  Sue Rubio RN, BSN  [facility]
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
## 2023-05-18 NOTE — TELEPHONE ENCOUNTER
Jess Kauffman APRN CNP Anticoag [place] 14 minutes ago (11:12 AM)     KS  Okay with following the stated guidelines.     Jess Kauffman CNP      Reviewed warfarin hold plan with patient.  He verbalized understanding of plan and repeated dosing plan back correctly.

## 2023-05-19 ENCOUNTER — LAB (OUTPATIENT)
Dept: LAB | Facility: CLINIC | Age: 58
End: 2023-05-19
Payer: COMMERCIAL

## 2023-05-19 DIAGNOSIS — E78.5 HYPERLIPIDEMIA LDL GOAL <100: ICD-10-CM

## 2023-05-19 DIAGNOSIS — E11.65 TYPE 2 DIABETES MELLITUS WITH HYPERGLYCEMIA, WITHOUT LONG-TERM CURRENT USE OF INSULIN (H): ICD-10-CM

## 2023-05-19 LAB
CHOLEST SERPL-MCNC: 112 MG/DL
CREAT UR-MCNC: 51.2 MG/DL
HBA1C MFR BLD: 5.2 % (ref 0–5.6)
HDLC SERPL-MCNC: 47 MG/DL
LDLC SERPL CALC-MCNC: 47 MG/DL
MICROALBUMIN UR-MCNC: <12 MG/L
MICROALBUMIN/CREAT UR: NORMAL MG/G{CREAT}
NONHDLC SERPL-MCNC: 65 MG/DL
TRIGL SERPL-MCNC: 90 MG/DL

## 2023-05-19 PROCEDURE — 82043 UR ALBUMIN QUANTITATIVE: CPT

## 2023-05-19 PROCEDURE — 82570 ASSAY OF URINE CREATININE: CPT

## 2023-05-19 PROCEDURE — 80061 LIPID PANEL: CPT

## 2023-05-19 PROCEDURE — 36415 COLL VENOUS BLD VENIPUNCTURE: CPT

## 2023-05-19 PROCEDURE — 83036 HEMOGLOBIN GLYCOSYLATED A1C: CPT

## 2023-05-25 ENCOUNTER — DOCUMENTATION ONLY (OUTPATIENT)
Dept: ANTICOAGULATION | Facility: CLINIC | Age: 58
End: 2023-05-25

## 2023-05-25 ENCOUNTER — HOSPITAL ENCOUNTER (OUTPATIENT)
Facility: CLINIC | Age: 58
Discharge: HOME | End: 2023-05-25
Attending: SURGERY | Admitting: SURGERY
Payer: COMMERCIAL

## 2023-05-25 VITALS
TEMPERATURE: 97 F | HEIGHT: 71 IN | OXYGEN SATURATION: 95 % | HEART RATE: 87 BPM | DIASTOLIC BLOOD PRESSURE: 74 MMHG | SYSTOLIC BLOOD PRESSURE: 101 MMHG | BODY MASS INDEX: 29.12 KG/M2 | RESPIRATION RATE: 16 BRPM | WEIGHT: 208 LBS

## 2023-05-25 DIAGNOSIS — Z12.11 SPECIAL SCREENING FOR MALIGNANT NEOPLASMS, COLON: Primary | ICD-10-CM

## 2023-05-25 LAB
COLONOSCOPY: NORMAL
GLUCOSE BLDC GLUCOMTR-MCNC: 113 MG/DL (ref 70–99)
INR PPP: 0.97 (ref 0.85–1.15)

## 2023-05-25 PROCEDURE — G0121 COLON CA SCRN NOT HI RSK IND: HCPCS | Performed by: SURGERY

## 2023-05-25 PROCEDURE — 45378 DIAGNOSTIC COLONOSCOPY: CPT | Performed by: SURGERY

## 2023-05-25 PROCEDURE — 82962 GLUCOSE BLOOD TEST: CPT

## 2023-05-25 PROCEDURE — 99152 MOD SED SAME PHYS/QHP 5/>YRS: CPT | Mod: 59 | Performed by: SURGERY

## 2023-05-25 PROCEDURE — 36415 COLL VENOUS BLD VENIPUNCTURE: CPT | Performed by: SURGERY

## 2023-05-25 PROCEDURE — 85610 PROTHROMBIN TIME: CPT | Performed by: SURGERY

## 2023-05-25 PROCEDURE — 250N000011 HC RX IP 250 OP 636: Performed by: SURGERY

## 2023-05-25 PROCEDURE — G0500 MOD SEDAT ENDO SERVICE >5YRS: HCPCS | Performed by: SURGERY

## 2023-05-25 RX ORDER — ONDANSETRON 4 MG/1
4 TABLET, ORALLY DISINTEGRATING ORAL EVERY 6 HOURS PRN
Status: DISCONTINUED | OUTPATIENT
Start: 2023-05-25 | End: 2023-05-25 | Stop reason: HOSPADM

## 2023-05-25 RX ORDER — NALOXONE HYDROCHLORIDE 0.4 MG/ML
0.4 INJECTION, SOLUTION INTRAMUSCULAR; INTRAVENOUS; SUBCUTANEOUS
Status: DISCONTINUED | OUTPATIENT
Start: 2023-05-25 | End: 2023-05-25 | Stop reason: HOSPADM

## 2023-05-25 RX ORDER — PROCHLORPERAZINE MALEATE 10 MG
10 TABLET ORAL EVERY 6 HOURS PRN
Status: DISCONTINUED | OUTPATIENT
Start: 2023-05-25 | End: 2023-05-25 | Stop reason: HOSPADM

## 2023-05-25 RX ORDER — ONDANSETRON 2 MG/ML
4 INJECTION INTRAMUSCULAR; INTRAVENOUS
Status: DISCONTINUED | OUTPATIENT
Start: 2023-05-25 | End: 2023-05-25 | Stop reason: HOSPADM

## 2023-05-25 RX ORDER — LIDOCAINE 40 MG/G
CREAM TOPICAL
Status: DISCONTINUED | OUTPATIENT
Start: 2023-05-25 | End: 2023-05-25 | Stop reason: HOSPADM

## 2023-05-25 RX ORDER — SIMETHICONE 40MG/0.6ML
133 SUSPENSION, DROPS(FINAL DOSAGE FORM)(ML) ORAL
Status: DISCONTINUED | OUTPATIENT
Start: 2023-05-25 | End: 2023-05-25 | Stop reason: HOSPADM

## 2023-05-25 RX ORDER — ONDANSETRON 2 MG/ML
4 INJECTION INTRAMUSCULAR; INTRAVENOUS EVERY 6 HOURS PRN
Status: DISCONTINUED | OUTPATIENT
Start: 2023-05-25 | End: 2023-05-25 | Stop reason: HOSPADM

## 2023-05-25 RX ORDER — EPINEPHRINE 1 MG/ML
0.1 INJECTION, SOLUTION INTRAMUSCULAR; SUBCUTANEOUS
Status: DISCONTINUED | OUTPATIENT
Start: 2023-05-25 | End: 2023-05-25 | Stop reason: HOSPADM

## 2023-05-25 RX ORDER — FLUMAZENIL 0.1 MG/ML
0.2 INJECTION, SOLUTION INTRAVENOUS
Status: DISCONTINUED | OUTPATIENT
Start: 2023-05-25 | End: 2023-05-25 | Stop reason: HOSPADM

## 2023-05-25 RX ORDER — NALOXONE HYDROCHLORIDE 0.4 MG/ML
0.2 INJECTION, SOLUTION INTRAMUSCULAR; INTRAVENOUS; SUBCUTANEOUS
Status: DISCONTINUED | OUTPATIENT
Start: 2023-05-25 | End: 2023-05-25 | Stop reason: HOSPADM

## 2023-05-25 RX ORDER — ATROPINE SULFATE 0.1 MG/ML
1 INJECTION INTRAVENOUS
Status: DISCONTINUED | OUTPATIENT
Start: 2023-05-25 | End: 2023-05-25 | Stop reason: HOSPADM

## 2023-05-25 RX ORDER — FENTANYL CITRATE 50 UG/ML
50-100 INJECTION, SOLUTION INTRAMUSCULAR; INTRAVENOUS EVERY 5 MIN PRN
Status: DISCONTINUED | OUTPATIENT
Start: 2023-05-25 | End: 2023-05-25 | Stop reason: HOSPADM

## 2023-05-25 RX ORDER — DIPHENHYDRAMINE HYDROCHLORIDE 50 MG/ML
25-50 INJECTION INTRAMUSCULAR; INTRAVENOUS
Status: DISCONTINUED | OUTPATIENT
Start: 2023-05-25 | End: 2023-05-25 | Stop reason: HOSPADM

## 2023-05-25 RX ADMIN — FENTANYL CITRATE 100 MCG: 0.05 INJECTION, SOLUTION INTRAMUSCULAR; INTRAVENOUS at 11:13

## 2023-05-25 RX ADMIN — MIDAZOLAM 2 MG: 1 INJECTION INTRAMUSCULAR; INTRAVENOUS at 11:12

## 2023-05-25 ASSESSMENT — ACTIVITIES OF DAILY LIVING (ADL): ADLS_ACUITY_SCORE: 35

## 2023-05-25 NOTE — PROGRESS NOTES
ANTICOAGULATION  MANAGEMENT: Discharge Review    Schuyler Castro chart reviewed for anticoagulation continuity of care    Outpatient surgery/procedure on 5/25/23 for colonoscopy.    Discharge disposition: Home    Results:    Recent labs: (last 7 days)     05/25/23  1046   INR 0.97     Anticoagulation inpatient management:     not applicable     Anticoagulation discharge instructions:     Warfarin dosing: home regimen continued   Bridging: No   INR goal change: No      Medication changes affecting anticoagulation: No    Additional factors affecting anticoagulation: No     PLAN     No adjustment to anticoagulation plan needed - pt has already received warfarin interruption pre/post procedure instructions and has follow up INR scheduled.     Patient not contacted    No adjustment to Anticoagulation Calendar was required    Andrez Mayo RN

## 2023-05-25 NOTE — H&P
[facility] Anesthesia Pre-op History and Physical    Schuyler Castro MRN# [id number]   Age: 57 year old YOB: 1965      Date of Surgery: 05/25/23   [facility]      Date of Exam 5/25/2023 Facility (Same day)       Primary care provider: Jess Kauffman         Chief Complaint and/or Reason for Procedure:   screening colonoscopy         Active problem list:     Patient Active Problem List    Diagnosis Date Noted     Personal history of DVT (deep vein thrombosis) 03/07/2022     Priority: Medium     Encounter for preventive measure 08/18/2021     Priority: Medium     History of pulmonary embolism 04/09/2020     Priority: Medium     Long term current use of anticoagulants with INR goal of 2.0-3.0 04/09/2020     Priority: Medium     Seborrheic keratosis 04/24/2019     Priority: Medium     Extensive.       AK (actinic keratosis) 04/24/2019     Priority: Medium     Tinea pedis of both feet 04/24/2019     Priority: Medium     Morbid obesity (H) 05/25/2018     Priority: Medium     Type 2 diabetes mellitus with hyperglycemia (H) 10/25/2015     Priority: Medium     Hypertension goal BP (blood pressure) < 140/90 10/01/2013     Priority: Medium     Hyperlipidemia LDL goal <100 04/17/2013     Priority: Medium            Medications (include herbals and vitamins):     Current Outpatient Medications   Medication Instructions     Acetaminophen (TYLENOL PO) 650 mg     atenolol (TENORMIN) 50 MG tablet TAKE 1 TABLET BY MOUTH EVERY DAY     bisacodyl (DULCOLAX) 5 MG EC tablet Take 2 tablets at 3 pm the day before your procedure. If your procedure is before 11 am, take 2 additional tablets at 11 pm. If your procedure is after 11 am, take 2 additional tablets at 6 am. For additional instructions refer to your colonoscopy prep instructions.     blood glucose (NO BRAND SPECIFIED) lancets standard Use to test blood sugar 2-3 times daily or as directed.     blood glucose (NO BRAND  "SPECIFIED) test strip Use to test blood sugar 2-3 times daily or as directed.     blood glucose monitoring (NO BRAND SPECIFIED) meter device kit Use to test blood sugar 2-3 times daily or as directed.     empagliflozin (JARDIANCE) 10 mg, Oral, DAILY     glipiZIDE (GLUCOTROL XL) 10 mg, Oral, DAILY     lisinopril-hydrochlorothiazide (ZESTORETIC) 20-12.5 MG tablet TAKE 2 TABLETS BY MOUTH EVERY DAY     metFORMIN (GLUCOPHAGE) 1,000 mg, Oral, 2 TIMES DAILY WITH MEALS     polyethylene glycol (GOLYTELY) 236 g suspension The night before the exam at 6 pm drink an 8-ounce glass every 15 minutes until the jug is half empty. If you arrive before 11 AM: Drink the other half of the Golytely jug at 11 PM night before procedure. If you arrive after 11 AM: Drink the other half of the Golytely jug at 6 AM day of procedure. For additional instructions refer to your colonoscopy prep instructions.     rosuvastatin (CRESTOR) 20 mg, Oral, DAILY     Rybelsus 14 mg, Oral, DAILY     warfarin ANTICOAGULANT (COUMADIN) 5 MG tablet Take 5mg every Tu, Thur & Sat / Take 7.5mg rest of week OR per INR clinic                Allergies:    No Known Allergies            Physical Exam:   All vitals have been reviewed  Patient Vitals for the past 8 hrs:   Height Weight   05/25/23 1039 1.81 m (5' 11.25\") 94.3 kg (208 lb)     Airway assessment:   Mallampatti classification: Class II (visualization of the soft palate, fauces, and uvula)}     Lungs:   No increased work of breathing, good air exchange, clear to auscultation bilaterally     Cardiovascular:   regular rhythm             Lab / Radiology Results:   N/a        Anesthetic risk and/or ASA classification:   asa3    Nina Menjivar MD       "

## 2023-06-02 ENCOUNTER — ANTICOAGULATION THERAPY VISIT (OUTPATIENT)
Dept: ANTICOAGULATION | Facility: CLINIC | Age: 58
End: 2023-06-02

## 2023-06-02 ENCOUNTER — LAB (OUTPATIENT)
Dept: LAB | Facility: CLINIC | Age: 58
End: 2023-06-02
Payer: COMMERCIAL

## 2023-06-02 DIAGNOSIS — Z86.718 PERSONAL HISTORY OF DVT (DEEP VEIN THROMBOSIS): ICD-10-CM

## 2023-06-02 DIAGNOSIS — Z86.711 HISTORY OF PULMONARY EMBOLISM: Primary | ICD-10-CM

## 2023-06-02 DIAGNOSIS — Z86.711 HISTORY OF PULMONARY EMBOLISM: ICD-10-CM

## 2023-06-02 DIAGNOSIS — Z79.01 LONG TERM CURRENT USE OF ANTICOAGULANTS WITH INR GOAL OF 2.0-3.0: ICD-10-CM

## 2023-06-02 LAB — INR BLD: 1.6 (ref 0.9–1.1)

## 2023-06-02 PROCEDURE — 36415 COLL VENOUS BLD VENIPUNCTURE: CPT

## 2023-06-02 PROCEDURE — 85610 PROTHROMBIN TIME: CPT

## 2023-06-02 NOTE — PROGRESS NOTES
ANTICOAGULATION MANAGEMENT     Schuyler Castro 57 year old male is on warfarin with subtherapeutic INR result. (Goal INR 2.0-3.0)    Recent labs: (last 7 days)     06/02/23  0900   INR 1.6*       ASSESSMENT     Warfarin Lab Questionnaire    Warfarin Doses Last 7 Days      6/2/2023     8:57 AM   Dose in Tablet or Mg   TAB or MG? milligram (mg)           6/2/2023   Warfarin Lab Questionnaire   Missed doses within past 14 days? No --Yes--intentional hold    Changes in diet or alcohol within past 14 days? No-patient states he has eaten 2 salads in the past week but his usual intake is 3 per week.   Medication changes since last result? No   Injuries or illness since last result? No   New shortness of breath, severe headaches or sudden changes in vision since last result? No   Abnormal bleeding since last result? No   Upcoming surgery, procedure? No        Previous result: Therapeutic last 2(+) visits  Additional findings: patient states he took 2 booster warfarin doses after the colonoscopy.       PLAN     Recommended plan for temporary change(s) affecting INR     Dosing Instructions: booster dose then continue your current warfarin dose with next INR in 1 week; however, patient elected for  10 days due to work schedule.    Summary  As of 6/2/2023    Full warfarin instructions:  6/2: 12.5 mg; Otherwise 5 mg every Tue, Thu, Sat; 7.5 mg all other days   Next INR check:  6/12/2023             Telephone call with David who agrees to plan and repeated back plan correctly    Lab visit scheduled    Education provided:     Dietary considerations: importance of consistent vitamin K intake and patient will avoid greens today then resume his usual regimen    Plan made per [facility] anticoagulation protocol    Lyly Florian, RN  Anticoagulation Clinic  6/2/2023    _______________________________________________________________________     Anticoagulation Episode Summary     Current INR goal:  2.0-3.0   TTR:  75.5 % (1 y)   Target end  date:  Indefinite   Send INR reminders to:  CASEY GARCIA    Indications    Personal history of DVT (deep vein thrombosis) (Resolved) [Z86.718]  History of pulmonary embolism [Z86.711]  Long term current use of anticoagulants with INR goal of 2.0-3.0 [Z79.01]  Personal history of DVT (deep vein thrombosis) [Z86.718]           Comments:  6/7/21-OK for 12 week INR monitoring         Anticoagulation Care Providers     Provider Role Specialty Phone number    Rasheeda Juan MD Referring Family Medicine [phone]    Jess Kauffman APRN CNP Referring Family Medicine [phone]

## 2023-06-12 ENCOUNTER — ANTICOAGULATION THERAPY VISIT (OUTPATIENT)
Dept: ANTICOAGULATION | Facility: CLINIC | Age: 58
End: 2023-06-12

## 2023-06-12 ENCOUNTER — LAB (OUTPATIENT)
Dept: LAB | Facility: CLINIC | Age: 58
End: 2023-06-12
Payer: COMMERCIAL

## 2023-06-12 DIAGNOSIS — Z86.718 PERSONAL HISTORY OF DVT (DEEP VEIN THROMBOSIS): ICD-10-CM

## 2023-06-12 DIAGNOSIS — Z86.711 HISTORY OF PULMONARY EMBOLISM: Primary | ICD-10-CM

## 2023-06-12 DIAGNOSIS — Z79.01 LONG TERM CURRENT USE OF ANTICOAGULANTS WITH INR GOAL OF 2.0-3.0: ICD-10-CM

## 2023-06-12 DIAGNOSIS — Z86.711 HISTORY OF PULMONARY EMBOLISM: ICD-10-CM

## 2023-06-12 LAB — INR BLD: 2 (ref 0.9–1.1)

## 2023-06-12 PROCEDURE — 36416 COLLJ CAPILLARY BLOOD SPEC: CPT

## 2023-06-12 PROCEDURE — 85610 PROTHROMBIN TIME: CPT

## 2023-06-12 NOTE — PROGRESS NOTES
ANTICOAGULATION MANAGEMENT     Schuyler Castro 57 year old male is on warfarin with therapeutic INR result. (Goal INR 2.0-3.0)    Recent labs: (last 7 days)     06/12/23  0923   INR 2.0*       ASSESSMENT       Source(s): Chart Review and Patient/Caregiver Call       Warfarin doses taken: Warfarin taken as instructed    Diet: No new diet changes identified    Medication/supplement changes: None noted    New illness, injury, or hospitalization: No    Signs or symptoms of bleeding or clotting: No    Previous result: Subtherapeutic    Additional findings: None         PLAN     Recommended plan for no diet, medication or health factor changes affecting INR     Dosing Instructions: Continue your current warfarin dose with next INR in 3 weeks       Summary  As of 6/12/2023    Full warfarin instructions:  5 mg every Tue, Thu, Sat; 7.5 mg all other days   Next INR check:  7/7/2023             Telephone call with David who verbalizes understanding and agrees to plan    Lab visit scheduled    Education provided:     None required    Plan made per [facility] anticoagulation protocol    Lyly Florian RN  Anticoagulation Clinic  6/12/2023    _______________________________________________________________________     Anticoagulation Episode Summary     Current INR goal:  2.0-3.0   TTR:  72.8 % (1 y)   Target end date:  Indefinite   Send INR reminders to:  CASEY GARCIA    Indications    Personal history of DVT (deep vein thrombosis) (Resolved) [Z86.718]  History of pulmonary embolism [Z86.711]  Long term current use of anticoagulants with INR goal of 2.0-3.0 [Z79.01]  Personal history of DVT (deep vein thrombosis) [Z86.718]           Comments:  6/7/21-OK for 12 week INR monitoring         Anticoagulation Care Providers     Provider Role Specialty Phone number    Rasheeda Juan MD Referring Family Medicine [phone]    Jess Kauffman APRN CNP Referring Family Medicine [phone]

## 2023-06-20 ENCOUNTER — TRANSFERRED RECORDS (OUTPATIENT)
Dept: MULTI SPECIALTY CLINIC | Facility: CLINIC | Age: 58
End: 2023-06-20

## 2023-06-21 ENCOUNTER — TRANSFERRED RECORDS (OUTPATIENT)
Dept: HEALTH INFORMATION MANAGEMENT | Facility: CLINIC | Age: 58
End: 2023-06-21
Payer: COMMERCIAL

## 2023-06-21 LAB — RETINOPATHY: NEGATIVE

## 2023-07-07 ENCOUNTER — LAB (OUTPATIENT)
Dept: LAB | Facility: CLINIC | Age: 58
End: 2023-07-07
Payer: COMMERCIAL

## 2023-07-07 ENCOUNTER — ANTICOAGULATION THERAPY VISIT (OUTPATIENT)
Dept: ANTICOAGULATION | Facility: CLINIC | Age: 58
End: 2023-07-07

## 2023-07-07 DIAGNOSIS — Z86.711 HISTORY OF PULMONARY EMBOLISM: Primary | ICD-10-CM

## 2023-07-07 DIAGNOSIS — Z86.718 PERSONAL HISTORY OF DVT (DEEP VEIN THROMBOSIS): ICD-10-CM

## 2023-07-07 DIAGNOSIS — Z79.01 LONG TERM CURRENT USE OF ANTICOAGULANTS WITH INR GOAL OF 2.0-3.0: ICD-10-CM

## 2023-07-07 DIAGNOSIS — Z86.711 HISTORY OF PULMONARY EMBOLISM: ICD-10-CM

## 2023-07-07 LAB — INR BLD: 1.9 (ref 0.9–1.1)

## 2023-07-07 PROCEDURE — 36416 COLLJ CAPILLARY BLOOD SPEC: CPT

## 2023-07-07 PROCEDURE — 85610 PROTHROMBIN TIME: CPT

## 2023-07-07 NOTE — PROGRESS NOTES
ANTICOAGULATION MANAGEMENT     Schuyler Castro 57 year old male is on warfarin with subtherapeutic INR result. (Goal INR 2.0-3.0)    Recent labs: (last 7 days)     07/07/23  0746   INR 1.9*       ASSESSMENT       Source(s): Chart Review and Patient/Caregiver Call       Warfarin doses taken: Warfarin taken as instructed    Diet: No new diet changes identified    Medication/supplement changes: None noted    New illness, injury, or hospitalization: No    Signs or symptoms of bleeding or clotting: No    Previous result: Therapeutic last visit; previously outside of goal range.  INR has been consistently running on the lower end of the therapeutic range or sub therapeutic for several checks, so warfarin maintenance dose adjustment done today to help bring INR closer to 2.5.    Additional findings: None         PLAN     Recommended plan for no diet, medication or health factor changes affecting INR     Dosing Instructions: booster dose then Increase your warfarin dose (5.6% change) with next INR in 3 weeks       Summary  As of 7/7/2023    Full warfarin instructions:  7/7: 10 mg; Otherwise 5 mg every Tue, Sat; 7.5 mg all other days   Next INR check:  7/31/2023             Telephone call with David who agrees to plan and repeated back plan correctly    Lab visit scheduled    Education provided:     Please call back if any changes to your diet, medications or how you've been taking warfarin    Plan made per [facility] anticoagulation protocol    Violeta Sanchez RN  Anticoagulation Clinic  7/7/2023    _______________________________________________________________________     Anticoagulation Episode Summary     Current INR goal:  2.0-3.0   TTR:  65.9 % (1 y)   Target end date:  Indefinite   Send INR reminders to:  CASEY GARCIA    Indications    Personal history of DVT (deep vein thrombosis) (Resolved) [Z86.718]  History of pulmonary embolism [Z86.711]  Long term current use of anticoagulants with INR goal of 2.0-3.0  [Z79.01]  Personal history of DVT (deep vein thrombosis) [Z86.718]           Comments:  6/7/21-OK for 12 week INR monitoring         Anticoagulation Care Providers     Provider Role Specialty Phone number    Rasheeda Juan MD Referring Family Medicine [phone]    Jess Kauffman APRN CNP Referring Family Medicine [phone]

## 2023-07-10 DIAGNOSIS — I10 HYPERTENSION GOAL BP (BLOOD PRESSURE) < 140/90: ICD-10-CM

## 2023-07-10 DIAGNOSIS — E78.5 HYPERLIPIDEMIA LDL GOAL <100: ICD-10-CM

## 2023-07-10 DIAGNOSIS — E11.65 TYPE 2 DIABETES MELLITUS WITH HYPERGLYCEMIA, WITHOUT LONG-TERM CURRENT USE OF INSULIN (H): ICD-10-CM

## 2023-07-10 NOTE — TELEPHONE ENCOUNTER
Patient calls requesting medication refills. Refill requests mandi'd up and sent to refill pool.    Patient denies additional questions or concerns at this time.    Violeta ROCHA RN, BSN, PHN

## 2023-07-11 NOTE — TELEPHONE ENCOUNTER
Routing refill request to provider for review/approval because:  Labs out of range:    Creatinine   Date Value Ref Range Status   04/17/2023 1.70 (H) 0.67 - 1.17 mg/dL Final   04/24/2019 1.12 0.66 - 1.25 mg/dL Final

## 2023-07-12 RX ORDER — ROSUVASTATIN CALCIUM 20 MG/1
20 TABLET, COATED ORAL DAILY
Qty: 90 TABLET | Refills: 3 | Status: SHIPPED | OUTPATIENT
Start: 2023-07-12 | End: 2024-07-22

## 2023-07-12 RX ORDER — LISINOPRIL AND HYDROCHLOROTHIAZIDE 12.5; 2 MG/1; MG/1
2 TABLET ORAL DAILY
Qty: 180 TABLET | Refills: 0 | Status: SHIPPED | OUTPATIENT
Start: 2023-07-12 | End: 2023-10-30

## 2023-07-12 RX ORDER — ATENOLOL 50 MG/1
50 TABLET ORAL DAILY
Qty: 90 TABLET | Refills: 0 | Status: SHIPPED | OUTPATIENT
Start: 2023-07-12 | End: 2023-11-08

## 2023-07-12 RX ORDER — ORAL SEMAGLUTIDE 14 MG/1
14 TABLET ORAL DAILY
Qty: 90 TABLET | Refills: 0 | Status: SHIPPED | OUTPATIENT
Start: 2023-07-12 | End: 2023-10-30

## 2023-07-31 ENCOUNTER — LAB (OUTPATIENT)
Dept: LAB | Facility: CLINIC | Age: 58
End: 2023-07-31
Payer: COMMERCIAL

## 2023-07-31 ENCOUNTER — ANTICOAGULATION THERAPY VISIT (OUTPATIENT)
Dept: ANTICOAGULATION | Facility: CLINIC | Age: 58
End: 2023-07-31

## 2023-07-31 ENCOUNTER — TELEPHONE (OUTPATIENT)
Dept: FAMILY MEDICINE | Facility: CLINIC | Age: 58
End: 2023-07-31

## 2023-07-31 DIAGNOSIS — Z79.01 LONG TERM CURRENT USE OF ANTICOAGULANTS WITH INR GOAL OF 2.0-3.0: ICD-10-CM

## 2023-07-31 DIAGNOSIS — Z86.718 PERSONAL HISTORY OF DVT (DEEP VEIN THROMBOSIS): ICD-10-CM

## 2023-07-31 DIAGNOSIS — Z86.711 HISTORY OF PULMONARY EMBOLISM: Primary | ICD-10-CM

## 2023-07-31 DIAGNOSIS — Z86.711 HISTORY OF PULMONARY EMBOLISM: ICD-10-CM

## 2023-07-31 DIAGNOSIS — Z79.01 LONG TERM CURRENT USE OF ANTICOAGULANTS WITH INR GOAL OF 2.0-3.0: Primary | ICD-10-CM

## 2023-07-31 LAB — INR BLD: 2.5 (ref 0.9–1.1)

## 2023-07-31 PROCEDURE — 85610 PROTHROMBIN TIME: CPT

## 2023-07-31 PROCEDURE — 36416 COLLJ CAPILLARY BLOOD SPEC: CPT

## 2023-07-31 RX ORDER — WARFARIN SODIUM 5 MG/1
TABLET ORAL
Qty: 125 TABLET | Refills: 1 | Status: SHIPPED | OUTPATIENT
Start: 2023-07-31 | End: 2024-01-25

## 2023-07-31 NOTE — PROGRESS NOTES
ANTICOAGULATION MANAGEMENT     Schuyler Castro 57 year old male is on warfarin with therapeutic INR result. (Goal INR 2.0-3.0)    Recent labs: (last 7 days)     07/31/23  0814   INR 2.5*       ASSESSMENT     Source(s): Chart Review and Patient/Caregiver Call     Warfarin doses taken: Warfarin taken as instructed  Diet: No new diet changes identified  Medication/supplement changes: None noted  New illness, injury, or hospitalization: No  Signs or symptoms of bleeding or clotting: No  Previous result: Subtherapeutic  Additional findings: Warfarin maintenance dose was increased 5% at last visit       PLAN     Recommended plan for no diet, medication or health factor changes affecting INR     Dosing Instructions: Continue your current warfarin dose with next INR in 4 weeks       Summary  As of 7/31/2023      Full warfarin instructions:  5 mg every Tue, Sat; 7.5 mg all other days   Next INR check:  8/31/2023               Telephone call with David who verbalizes understanding and agrees to plan    Lab visit scheduled    Education provided:   None required    Plan made per [facility] anticoagulation protocol    Lyly Florian RN  Anticoagulation Clinic  7/31/2023    _______________________________________________________________________     Anticoagulation Episode Summary       Current INR goal:  2.0-3.0   TTR:  64.8 % (1 y)   Target end date:  Indefinite   Send INR reminders to:  CASEY GARCIA    Indications    Personal history of DVT (deep vein thrombosis) (Resolved) [Z86.718]  History of pulmonary embolism [Z86.711]  Long term current use of anticoagulants with INR goal of 2.0-3.0 [Z79.01]  Personal history of DVT (deep vein thrombosis) [Z86.718]             Comments:  6/7/21-OK for 12 week INR monitoring             Anticoagulation Care Providers       Provider Role Specialty Phone number    Rasheeda Juan MD Referring Family Medicine [phone]    Jess Kauffman APRN CNP Referring Family Medicine [phone]

## 2023-07-31 NOTE — TELEPHONE ENCOUNTER
Patient requesting warfarin refill since maintenance dose was recently increased.    ANTICOAGULATION MANAGEMENT:  Medication Refill    Anticoagulation Summary  As of 7/31/2023      Warfarin maintenance plan:  No maintenance plan   Next INR check:     Target end date:  Indefinite    Indications    Personal history of DVT (deep vein thrombosis) (Resolved) [Z86.718]  History of pulmonary embolism [Z86.711]  Long term current use of anticoagulants with INR goal of 2.0-3.0 [Z79.01]  Personal history of DVT (deep vein thrombosis) [Z86.718]                 Anticoagulation Care Providers       Provider Role Specialty Phone number    Rasheeda Juan MD Referring Family Medicine [phone]    Jess Kauffman APRN CNP Referring Family Medicine [phone]            Refill Criteria    Visit with referring provider/group: Meets criteria: office visit within referring provider group in the last 1 year on 01/23/2023    [facility] referral signed last signed: 02/14/2023; within last year: Yes    Lab monitoring not exceeding 2 weeks overdue:  Yes    Schuyler meets all criteria for refill. Rx instructions and quantity supplied updated to match patient's current dosing plan. Warfarin 90 day supply with 1 refill granted per [facility] protocol     Lyly Florian, RN  Anticoagulation Clinic

## 2023-07-31 NOTE — TELEPHONE ENCOUNTER
Patient Returning Call    Reason for call:  pt has a few questions about his medication     Information relayed to patient:      Patient has additional questions:  No      Could we send this information to you in [email or web address] or would you prefer to receive a phone call?:   Patient would prefer a phone call [phone]   Okay to leave a detailed message?: Yes at Home number on file

## 2023-08-29 ENCOUNTER — TELEPHONE (OUTPATIENT)
Dept: FAMILY MEDICINE | Facility: CLINIC | Age: 58
End: 2023-08-29
Payer: COMMERCIAL

## 2023-08-29 DIAGNOSIS — Z79.01 LONG TERM CURRENT USE OF ANTICOAGULANTS WITH INR GOAL OF 2.0-3.0: ICD-10-CM

## 2023-08-29 DIAGNOSIS — Z86.718 PERSONAL HISTORY OF DVT (DEEP VEIN THROMBOSIS): ICD-10-CM

## 2023-08-29 DIAGNOSIS — Z86.711 HISTORY OF PULMONARY EMBOLISM: Primary | ICD-10-CM

## 2023-08-29 NOTE — TELEPHONE ENCOUNTER
I spoke to patient, he thinks he took 15mg by mistake on 8/28/23.  Patient advised to hold dose today, take 7.5mg on 8/30 with INR check, as planned, on 8/31/23.    Lyly Florian RN  Anticoagulation Clinic

## 2023-08-29 NOTE — TELEPHONE ENCOUNTER
Patient Returning Call    Reason for call:  patient took extra medication last night by accident    Information relayed to patient:  RN to return call    Patient has additional questions:  Yes        Could we send this information to you in [employer] or would you prefer to receive a phone call?:   Patient would prefer a phone call   Okay to leave a detailed message?: Yes at Cell number on file:    Telephone Information:   Mobile [phone]     Shannan Prieto

## 2023-08-31 ENCOUNTER — ANTICOAGULATION THERAPY VISIT (OUTPATIENT)
Dept: ANTICOAGULATION | Facility: CLINIC | Age: 58
End: 2023-08-31

## 2023-08-31 ENCOUNTER — LAB (OUTPATIENT)
Dept: LAB | Facility: CLINIC | Age: 58
End: 2023-08-31
Payer: COMMERCIAL

## 2023-08-31 DIAGNOSIS — Z86.711 HISTORY OF PULMONARY EMBOLISM: Primary | ICD-10-CM

## 2023-08-31 DIAGNOSIS — Z79.01 LONG TERM CURRENT USE OF ANTICOAGULANTS WITH INR GOAL OF 2.0-3.0: ICD-10-CM

## 2023-08-31 DIAGNOSIS — Z86.718 PERSONAL HISTORY OF DVT (DEEP VEIN THROMBOSIS): ICD-10-CM

## 2023-08-31 DIAGNOSIS — Z86.711 HISTORY OF PULMONARY EMBOLISM: ICD-10-CM

## 2023-08-31 LAB — INR BLD: 2.9 (ref 0.9–1.1)

## 2023-08-31 PROCEDURE — 36416 COLLJ CAPILLARY BLOOD SPEC: CPT

## 2023-08-31 PROCEDURE — 85610 PROTHROMBIN TIME: CPT

## 2023-08-31 NOTE — PROGRESS NOTES
ANTICOAGULATION MANAGEMENT     Schuyler Castro 58 year old male is on warfarin with therapeutic INR result. (Goal INR 2.0-3.0)    Recent labs: (last 7 days)     08/31/23  0725   INR 2.9*       ASSESSMENT     Source(s): Chart Review and Patient/Caregiver Call     Warfarin doses taken: Accidentally doubled up on his dose on 8/28/23, but held warfarin on 8/29 as instructed by INR nurse.  Other doses taken as directed.  Diet: No new diet changes identified  Medication/supplement changes: None noted  New illness, injury, or hospitalization: No  Signs or symptoms of bleeding or clotting: No  Previous result: Therapeutic last visit; previously outside of goal range  Additional findings: None       PLAN     Recommended plan for temporary change(s) affecting INR     Dosing Instructions: Continue your current warfarin dose with next INR in 4 weeks       Summary  As of 8/31/2023      Full warfarin instructions:  5 mg every Tue, Sat; 7.5 mg all other days   Next INR check:  9/28/2023               Telephone call with David who agrees to plan and repeated back plan correctly    Lab visit scheduled    Education provided:   Please call back if any changes to your diet, medications or how you've been taking warfarin    Plan made per [facility] anticoagulation protocol    Violeta Sanchez RN  Anticoagulation Clinic  8/31/2023    _______________________________________________________________________     Anticoagulation Episode Summary       Current INR goal:  2.0-3.0   TTR:  64.8 % (1 y)   Target end date:  Indefinite   Send INR reminders to:  CASEY GARCIA    Indications    Personal history of DVT (deep vein thrombosis) (Resolved) [Z86.718]  History of pulmonary embolism [Z86.711]  Long term current use of anticoagulants with INR goal of 2.0-3.0 [Z79.01]  Personal history of DVT (deep vein thrombosis) [Z86.718]             Comments:  6/7/21-OK for 12 week INR monitoring             Anticoagulation Care Providers       Provider Role  Specialty Phone number    Rasheeda Juan MD Referring Family Medicine [phone]    Jess Kauffman APRN CNP Referring Family Medicine [phone]

## 2023-09-12 ENCOUNTER — TELEPHONE (OUTPATIENT)
Dept: FAMILY MEDICINE | Facility: CLINIC | Age: 58
End: 2023-09-12
Payer: COMMERCIAL

## 2023-09-12 NOTE — TELEPHONE ENCOUNTER
"Pt calling.    -informs he had blood glucose level of 38 BG last night. Pt had completed a 10 mile hike yesterday. BG level was the lowest it is ever been. Pt was able to get blood sugars up again using the 15-15 rule.    -informs of BG level of 77 this morning. Pt has been testing his blood sugars daily in the morning but not usually in the evenings.    -informs his BG falls between  typically in the morning    Advised pt of dangers of severely low BG. Educated on potential risks including seizures and death. Patient was given an opportunity to ask questions, verbalized understanding of plan, and is agreeable.    Reviewed pt chart and OV with MTM on 4/17/23 (see relevant visit notes below)    Type 2 Diabetes: Patient is meeting A1c goal of < 7%. With his frequent hypoglycemia he would benefit from stopping glipizide today. He would prefer to reduce the dose gradually and discontinue within the next three months. I agreed to this and told him he should be having a low blood sugar no more than every couple weeks. If he continues to have hypoglycemia that he should let me know and we need to decrease his glipizide further or stop depending on how often he is having episodes. He agrees to this plan. He is aware of symptoms and knows how to treat hypoglycemia.      Pt states \"I'm really liking how everything is going. I really don't want to stop the glipizide. I can start testing my blood sugars every evening\".    Pt has appt with MTM scheduled for 10/30/23. He reports that his hypoglycemic episodes have decreased in frequency since glipizide was decreased from 10 mg bid down to once daily. He reports that his last hypoglycemic episode prior to last night was a month ago and his BG was around 60 at that time. He reports that hypoglycemic episodes are occurring once every other month and BG levels are typically around 60 (although he does mention there was one other time where BG was around 40- he is unsure of when " "this occurred). He would like to start testing in the evenings rather than decrease glipizide further but states \"I will do what Yenifer tells me to do\"    Pt requests a call back to inform    Kassy GOLDSMTIH RN    "

## 2023-09-12 NOTE — TELEPHONE ENCOUNTER
I called the patient, as Yenifer is out today. I talked him into holding glipizide for a week to see what his blood sugars look like and then reaching out to Yenifer via [employer] to let her know his plan going forward.    Mario OjedaD, [facility]  Medication Therapy Management Provider  Phone: [phone]  [email or web address]

## 2023-10-03 ENCOUNTER — ANTICOAGULATION THERAPY VISIT (OUTPATIENT)
Dept: ANTICOAGULATION | Facility: CLINIC | Age: 58
End: 2023-10-03

## 2023-10-03 ENCOUNTER — LAB (OUTPATIENT)
Dept: LAB | Facility: CLINIC | Age: 58
End: 2023-10-03
Payer: COMMERCIAL

## 2023-10-03 DIAGNOSIS — Z79.01 LONG TERM CURRENT USE OF ANTICOAGULANTS WITH INR GOAL OF 2.0-3.0: ICD-10-CM

## 2023-10-03 DIAGNOSIS — Z86.711 HISTORY OF PULMONARY EMBOLISM: Primary | ICD-10-CM

## 2023-10-03 DIAGNOSIS — Z86.711 HISTORY OF PULMONARY EMBOLISM: ICD-10-CM

## 2023-10-03 DIAGNOSIS — Z86.718 PERSONAL HISTORY OF DVT (DEEP VEIN THROMBOSIS): ICD-10-CM

## 2023-10-03 LAB — INR BLD: 2.2 (ref 0.9–1.1)

## 2023-10-03 PROCEDURE — 85610 PROTHROMBIN TIME: CPT

## 2023-10-03 PROCEDURE — 36416 COLLJ CAPILLARY BLOOD SPEC: CPT

## 2023-10-03 NOTE — PROGRESS NOTES
ANTICOAGULATION MANAGEMENT     Schuyler Castro 58 year old male is on warfarin with therapeutic INR result. (Goal INR 2.0-3.0)    Recent labs: (last 7 days)     10/03/23  0805   INR 2.2*       ASSESSMENT     Source(s): Chart Review and Patient/Caregiver Call     Warfarin doses taken: Warfarin taken as instructed  Diet: No new diet changes identified  Medication/supplement changes: None noted  New illness, injury, or hospitalization: No  Signs or symptoms of bleeding or clotting: No  Previous result: Therapeutic last 2(+) visits  Additional findings: None       PLAN     Recommended plan for no diet, medication or health factor changes affecting INR     Dosing Instructions: Continue your current warfarin dose with next INR in 5 weeks       Summary  As of 10/3/2023      Full warfarin instructions:  5 mg every Tue, Sat; 7.5 mg all other days   Next INR check:  11/7/2023               Telephone call with David who verbalizes understanding and agrees to plan    Lab visit scheduled    Education provided:   Please call back if any changes to your diet, medications or how you've been taking warfarin    Plan made per [facility] anticoagulation protocol    Alona Block RN  Anticoagulation Clinic  10/3/2023    _______________________________________________________________________     Anticoagulation Episode Summary       Current INR goal:  2.0-3.0   TTR:  64.8 % (1 y)   Target end date:  Indefinite   Send INR reminders to:  CASEY GARCIA    Indications    Personal history of DVT (deep vein thrombosis) (Resolved) [Z86.718]  History of pulmonary embolism [Z86.711]  Long term current use of anticoagulants with INR goal of 2.0-3.0 [Z79.01]  Personal history of DVT (deep vein thrombosis) [Z86.718]             Comments:  6/7/21-OK for 12 week INR monitoring             Anticoagulation Care Providers       Provider Role Specialty Phone number    Rasheeda Juan MD Referring Family Medicine [phone]    Jess Kauffman, CHIQUI  CNP Referring Family Medicine [phone]

## 2023-10-30 ENCOUNTER — ALLIED HEALTH/NURSE VISIT (OUTPATIENT)
Dept: PHARMACY | Facility: CLINIC | Age: 58
End: 2023-10-30
Payer: COMMERCIAL

## 2023-10-30 ENCOUNTER — LAB (OUTPATIENT)
Dept: LAB | Facility: CLINIC | Age: 58
End: 2023-10-30
Payer: COMMERCIAL

## 2023-10-30 VITALS
BODY MASS INDEX: 26.94 KG/M2 | OXYGEN SATURATION: 97 % | WEIGHT: 194.5 LBS | HEART RATE: 79 BPM | SYSTOLIC BLOOD PRESSURE: 95 MMHG | DIASTOLIC BLOOD PRESSURE: 63 MMHG

## 2023-10-30 DIAGNOSIS — E11.9 DIABETES MELLITUS, TYPE 2 (H): ICD-10-CM

## 2023-10-30 DIAGNOSIS — E11.65 TYPE 2 DIABETES MELLITUS WITH HYPERGLYCEMIA, WITHOUT LONG-TERM CURRENT USE OF INSULIN (H): Primary | ICD-10-CM

## 2023-10-30 DIAGNOSIS — I10 HYPERTENSION GOAL BP (BLOOD PRESSURE) < 140/90: ICD-10-CM

## 2023-10-30 LAB
ANION GAP SERPL CALCULATED.3IONS-SCNC: 12 MMOL/L (ref 7–15)
BUN SERPL-MCNC: 49.2 MG/DL (ref 6–20)
CALCIUM SERPL-MCNC: 9.6 MG/DL (ref 8.6–10)
CHLORIDE SERPL-SCNC: 99 MMOL/L (ref 98–107)
CREAT SERPL-MCNC: 1.55 MG/DL (ref 0.67–1.17)
DEPRECATED HCO3 PLAS-SCNC: 23 MMOL/L (ref 22–29)
EGFRCR SERPLBLD CKD-EPI 2021: 52 ML/MIN/1.73M2
GLUCOSE SERPL-MCNC: 120 MG/DL (ref 70–99)
HBA1C MFR BLD: 5.5 % (ref 0–5.6)
POTASSIUM SERPL-SCNC: 5.2 MMOL/L (ref 3.4–5.3)
SODIUM SERPL-SCNC: 134 MMOL/L (ref 135–145)

## 2023-10-30 PROCEDURE — 83036 HEMOGLOBIN GLYCOSYLATED A1C: CPT

## 2023-10-30 PROCEDURE — 36415 COLL VENOUS BLD VENIPUNCTURE: CPT

## 2023-10-30 PROCEDURE — 80048 BASIC METABOLIC PNL TOTAL CA: CPT

## 2023-10-30 PROCEDURE — 99207 PR NO CHARGE LOS: CPT | Performed by: PHARMACIST

## 2023-10-30 RX ORDER — LISINOPRIL AND HYDROCHLOROTHIAZIDE 12.5; 2 MG/1; MG/1
1 TABLET ORAL DAILY
Qty: 90 TABLET | Refills: 0 | Status: SHIPPED | OUTPATIENT
Start: 2023-10-30 | End: 2023-10-31 | Stop reason: SINTOL

## 2023-10-30 RX ORDER — ORAL SEMAGLUTIDE 14 MG/1
14 TABLET ORAL DAILY
Qty: 90 TABLET | Refills: 0 | Status: SHIPPED | OUTPATIENT
Start: 2023-10-30 | End: 2024-02-05

## 2023-10-30 NOTE — Clinical Note
Stopped glipizide as he is still having some blood sugars in the 60's. I also decreased lisinopril/hydrochlorothiazide today with blood pressure on the low end. If renal function has not improved we should recheck in a month or so to see if decreasing the lisinopril/hydrochlorothiazide has helped.  Let me know your thoughts.  Yenifer

## 2023-10-30 NOTE — PROGRESS NOTES
Medication Therapy Management (MTM) Encounter    ASSESSMENT:                            Medication Adherence/Access: No issues identified    Diabetes:   Patient is meeting A1c goal of < 7%. Self monitoring of blood glucose is at goal of fasting  mg/dL and post prandial < 150 mg/dL. He continues to drop below 70 at times. Patient would benefit from trying to stop glipizide to see if he can manage and maintain control with his lifestyle. If his A1C is above 6.5% at recheck, could consider restarting glipizide at low dose if appropriate. Aspirin therapy is not indicated in this patient due to anticoagulant/antiplatelet therapy.     Hypertension:   Patient is meeting blood pressure goal of < 140/90mmHg and well below goal likely due to his 70 pound weight loss in the last year. He is not having hypotension symptoms but to prevent will decrease lisinopril/hydrochlorothiazide today.  If his renal function has not improved we can recheck in a month or so since we decreased lisinopril/hydrochlorothiazide today and that could be helpful as well.    PLAN:                            Stop glipizide and keep monitoring blood sugars.  Reach out if you have concerns and we can reevaluate in January.  Decrease lisinopril/hydrochlorothiazide to just one tablet daily and monitor your blood pressure at home to make sure it stays well controlled. Less than 130/80.    Follow-up: Return in about 8 months (around 6/30/2024).    SUBJECTIVE/OBJECTIVE:                          David Castro is a 58 year old male coming in for a follow-up visit from 4/17/23.       Reason for visit: diabetes follow-up.    Allergies/ADRs: Reviewed in chart  Past Medical History: Reviewed in chart  Tobacco: He reports that he has never smoked. He has never used smokeless tobacco.  Alcohol: not currently using    Medication Adherence/Access: no issues reported    Diabetes   Rybelsus 14 mg once daily - has been better at taking on empty stomach (on this dose  since 10/2021)  metformin 1000 mg twice daily  glipizide XL 5 mg daily- decreased at last MTM visit and further decreased recently due to lows  Jardiance 10 mg once daily     Patient is not experiencing side effects. He is not interested in using a cgm at this time. He really doesn't want to use injectable medicine.     Blood sugar monitorin-2 time(s) daily; Ranges:             From last MTM visit:             Current diabetes symptoms: hypoglycemia less now that glipizide was decreased  Diet/Exercise: He is counting carbs and having 30 carbs for breakfast and lunch and dinner is 50-60 carbs and if he is hungry he has 15-20 grams snacks. He works on 4 days a week and most days when he has the day off but that varies. He only goes out about one a week but watches the carbs.    Reports he has been continuing his lifestyle changes. He continues to avoid all junk food and sweets. He is counting his carbs and eating less than 60 grams of carbs per meal and 15 gram carb snack. He is working out 3-5 times a week. He has continued to lose weight.      Eye exam is up to date  Foot exam is up to date  Urine Albumin:   Lab Results   Component Value Date    ALCR  2023      Comment:      Unable to calculate, urine albumin and/or urine creatinine is outside detectable limits.  Microalbuminuria is defined as an albumin:creatinine ratio of 17 to 299 for males and 25 to 299 for females. A ratio of albumin:creatinine of 300 or higher is indicative of overt proteinuria.  Due to biologic variability, positive results should be confirmed by a second, first-morning random or 24-hour timed urine specimen. If there is discrepancy, a third specimen is recommended. When 2 out of 3 results are in the microalbuminuria range, this is evidence for incipient nephropathy and warrants increased efforts at glucose control, blood pressure control, and institution of therapy with an angiotensin-converting-enzyme (ACE) inhibitor (if the  patient can tolerate it).      UMALCR 84.84 (H) 06/06/2022      Lab Results   Component Value Date    A1C 5.5 10/30/2023    A1C 5.2 05/19/2023     Creatinine   Date Value Ref Range Status   04/17/2023 1.70 (H) 0.67 - 1.17 mg/dL Final   01/23/2023 1.40 (H) 0.67 - 1.17 mg/dL Final   12/21/2022 1.56 (H) 0.67 - 1.17 mg/dL Final   04/24/2019 1.12 0.66 - 1.25 mg/dL Final   05/24/2018 1.07 0.66 - 1.25 mg/dL Final   01/23/2017 1.16 0.66 - 1.25 mg/dL Final     Wt Readings from Last 5 Encounters:   10/30/23 194 lb 8 oz (88.2 kg)   05/25/23 208 lb (94.3 kg)   04/17/23 218 lb 1.6 oz (98.9 kg)   01/23/23 246 lb 14.4 oz (112 kg)   12/21/22 254 lb 6.4 oz (115.4 kg)     Hypertension   Lisinopril/hydrochlorothiazide 20/12.5 - takes two tablets daily  Atenolol 50 mg once daily  Patient reports no current medication side effects  Patient does not self-monitor blood pressure.  He does have a cuff at home.     BP Readings from Last 3 Encounters:   10/30/23 95/63   05/25/23 101/74   04/17/23 119/74     Pulse Readings from Last 3 Encounters:   10/30/23 79   05/25/23 87   04/17/23 82       Today's Vitals: BP 95/63   Pulse 79   Wt 194 lb 8 oz (88.2 kg)   SpO2 97%   BMI 26.94 kg/m    ----------------      I spent 40 minutes with this patient today. All changes were made via collaborative practice agreement with CHIQUI Mike CNP. A copy of the visit note was provided to the patient's provider(s).    A summary of these recommendations was sent via [employer].    Yenifer De La Fuente, MarioD  Medication Therapy Management Pharmacist       Medication Therapy Recommendations  Hypertension goal BP (blood pressure) < 140/90    Current Medication: lisinopril-hydrochlorothiazide (ZESTORETIC) 20-12.5 MG tablet (Discontinued)   Rationale: Dose too high - Dosage too high - Safety   Recommendation: Decrease Dose - lisinopril-hydrochlorothiazide 20-12.5 MG tablet   Status: Accepted per CPA         Type 2 diabetes mellitus with hyperglycemia (H)     Current Medication: glipiZIDE (GLUCOTROL XL) 5 MG 24 hr tablet (Discontinued)   Rationale: Undesirable effect - Adverse medication event - Safety   Recommendation: Discontinue Medication   Status: Accepted per CPA

## 2023-10-30 NOTE — PATIENT INSTRUCTIONS
"Recommendations from today's MTM visit:                                                       Stop glipizide and keep monitoring blood sugars.  Reach out if you have concerns and we can reevaluate in January.  Decrease lisinopril/hydrochlorothiazide to just one tablet daily and monitor your blood pressure at home to make sure it stays well controlled. Less than 130/80.    Follow-up: Return in about 8 months (around 6/30/2024).    It was great speaking with you today.  I value your experience and would be very thankful for your time in providing feedback in our clinic survey. In the next few days, you may receive an email or text message from [employer] with a link to a survey related to your  clinical pharmacist.\"     To schedule another MTM appointment, please call the clinic directly or you may call the MTM scheduling line at [phone] or toll-free at [phone].     My Clinical Pharmacist's contact information:                                                      Please feel free to contact me with any questions or concerns you have.      Yenifer De La Fuente, PharmD  Medication Therapy Management Pharmacist  [facility] - Monday and Wednesday 7:30 - 4:00      "

## 2023-10-31 DIAGNOSIS — I10 HYPERTENSION GOAL BP (BLOOD PRESSURE) < 140/90: Primary | ICD-10-CM

## 2023-10-31 RX ORDER — LISINOPRIL 20 MG/1
20 TABLET ORAL DAILY
Qty: 30 TABLET | Refills: 2 | Status: SHIPPED | OUTPATIENT
Start: 2023-10-31 | End: 2024-01-03

## 2023-10-31 NOTE — PROGRESS NOTES
Based on bmp results switching from lisinopril/hydrochlorothiazide to just lisinopril 20 mg daily per discussion with Jess Kauffman CNP. Will order future BMP to be rechecked in a month.    Yenifer De La Fuente, PharmD  Medication Therapy Management Pharmacist

## 2023-11-07 ENCOUNTER — LAB (OUTPATIENT)
Dept: LAB | Facility: CLINIC | Age: 58
End: 2023-11-07
Payer: COMMERCIAL

## 2023-11-07 ENCOUNTER — ANTICOAGULATION THERAPY VISIT (OUTPATIENT)
Dept: ANTICOAGULATION | Facility: CLINIC | Age: 58
End: 2023-11-07

## 2023-11-07 DIAGNOSIS — Z86.711 HISTORY OF PULMONARY EMBOLISM: ICD-10-CM

## 2023-11-07 DIAGNOSIS — Z86.711 HISTORY OF PULMONARY EMBOLISM: Primary | ICD-10-CM

## 2023-11-07 DIAGNOSIS — Z86.718 PERSONAL HISTORY OF DVT (DEEP VEIN THROMBOSIS): ICD-10-CM

## 2023-11-07 DIAGNOSIS — Z79.01 LONG TERM CURRENT USE OF ANTICOAGULANTS WITH INR GOAL OF 2.0-3.0: ICD-10-CM

## 2023-11-07 LAB — INR BLD: 2.4 (ref 0.9–1.1)

## 2023-11-07 PROCEDURE — 85610 PROTHROMBIN TIME: CPT

## 2023-11-07 PROCEDURE — 36416 COLLJ CAPILLARY BLOOD SPEC: CPT

## 2023-11-07 NOTE — PROGRESS NOTES
ANTICOAGULATION MANAGEMENT     Schuyler Castro 58 year old male is on warfarin with therapeutic INR result. (Goal INR 2.0-3.0)    Recent labs: (last 7 days)     11/07/23  0739   INR 2.4*       ASSESSMENT   Source(s): Chart Review and Patient/Caregiver Call     Warfarin doses taken: Warfarin taken as instructed  Diet: No new diet changes identified  Medication/supplement changes: None noted  New illness, injury, or hospitalization: No  Signs or symptoms of bleeding or clotting: No  Previous result: Therapeutic last 2(+) visits  Additional findings: None       PLAN     Recommended plan for no diet, medication or health factor changes affecting INR     Dosing Instructions: Continue your current warfarin dose with next INR in 6 weeks       Summary  As of 11/7/2023      Full warfarin instructions:  5 mg every Tue, Sat; 7.5 mg all other days   Next INR check:  12/19/2023               Telephone call with David who verbalizes understanding and agrees to plan    Lab visit scheduled    Education provided:   Please call back if any changes to your diet, medications or how you've been taking warfarin    Plan made per [facility] anticoagulation protocol    Linnea Rodríguez RN  Anticoagulation Clinic  11/7/2023    _______________________________________________________________________     Anticoagulation Episode Summary       Current INR goal:  2.0-3.0   TTR:  64.8% (1 y)   Target end date:  Indefinite   Send INR reminders to:  CASEY GARCIA    Indications    Personal history of DVT (deep vein thrombosis) (Resolved) [Z86.718]  History of pulmonary embolism [Z86.711]  Long term current use of anticoagulants with INR goal of 2.0-3.0 [Z79.01]  Personal history of DVT (deep vein thrombosis) [Z86.718]             Comments:  6/7/21-OK for 12 week INR monitoring             Anticoagulation Care Providers       Provider Role Specialty Phone number    Rasheeda Juan MD Referring Family Medicine [phone]    Jess Kauffman, CHIQUI  CNP Referring Family Medicine [phone]

## 2023-11-08 DIAGNOSIS — I10 HYPERTENSION GOAL BP (BLOOD PRESSURE) < 140/90: ICD-10-CM

## 2023-11-08 RX ORDER — ATENOLOL 50 MG/1
50 TABLET ORAL DAILY
Qty: 90 TABLET | Refills: 0 | Status: SHIPPED | OUTPATIENT
Start: 2023-11-08 | End: 2024-02-05

## 2023-11-22 ENCOUNTER — TELEPHONE (OUTPATIENT)
Dept: PHARMACY | Facility: CLINIC | Age: 58
End: 2023-11-22
Payer: COMMERCIAL

## 2023-11-22 DIAGNOSIS — I10 HYPERTENSION GOAL BP (BLOOD PRESSURE) < 140/90: ICD-10-CM

## 2023-11-22 RX ORDER — LISINOPRIL 20 MG/1
20 TABLET ORAL DAILY
Qty: 90 TABLET | Refills: 1 | OUTPATIENT
Start: 2023-11-22

## 2023-11-22 NOTE — TELEPHONE ENCOUNTER
Called patient and left voicemail to call back and ask to speak to any triage nurse.     Bhavana Tolentino RN

## 2023-11-22 NOTE — TELEPHONE ENCOUNTER
Pt returned call   He does not refills   He states his pharmacy keeps requesting refills    Clarence Dwyer RN on 11/22/2023 at 2:16 PM

## 2023-11-29 ENCOUNTER — OFFICE VISIT (OUTPATIENT)
Dept: PHARMACY | Facility: CLINIC | Age: 58
End: 2023-11-29
Payer: COMMERCIAL

## 2023-11-29 ENCOUNTER — LAB (OUTPATIENT)
Dept: LAB | Facility: CLINIC | Age: 58
End: 2023-11-29
Payer: COMMERCIAL

## 2023-11-29 VITALS
SYSTOLIC BLOOD PRESSURE: 137 MMHG | OXYGEN SATURATION: 98 % | HEART RATE: 83 BPM | WEIGHT: 192.6 LBS | DIASTOLIC BLOOD PRESSURE: 77 MMHG | BODY MASS INDEX: 26.67 KG/M2

## 2023-11-29 DIAGNOSIS — I10 HYPERTENSION GOAL BP (BLOOD PRESSURE) < 140/90: ICD-10-CM

## 2023-11-29 DIAGNOSIS — E11.65 TYPE 2 DIABETES MELLITUS WITH HYPERGLYCEMIA, WITHOUT LONG-TERM CURRENT USE OF INSULIN (H): ICD-10-CM

## 2023-11-29 DIAGNOSIS — I10 HYPERTENSION GOAL BP (BLOOD PRESSURE) < 140/90: Primary | ICD-10-CM

## 2023-11-29 LAB — HBA1C MFR BLD: 5 % (ref 0–5.6)

## 2023-11-29 PROCEDURE — 83036 HEMOGLOBIN GLYCOSYLATED A1C: CPT

## 2023-11-29 PROCEDURE — 99207 PR NO CHARGE LOS: CPT | Performed by: PHARMACIST

## 2023-11-29 PROCEDURE — 80048 BASIC METABOLIC PNL TOTAL CA: CPT

## 2023-11-29 PROCEDURE — 36415 COLL VENOUS BLD VENIPUNCTURE: CPT

## 2023-11-29 NOTE — PROGRESS NOTES
Medication Therapy Management (MTM) Encounter    ASSESSMENT:                            Medication Adherence/Access: No issues identified    Hypertension:   Stable. Patient is meeting blood pressure goal of < 140/90mmHg. Will not make changes today. Patient would benefit from reducing or avoiding monster drinks as they have a lot of caffeine.  He would also benefit from continuing to check his blood pressure at least 3 times a week.  His home wrist cuff is accurate when compared to blood pressure cuff in clinic.  He is following up with PCP in January and can recheck at that time.  Recheck BMP today, results is not back yet.    PLAN:                            Keep checking your blood pressure three times a week. If you notice the top is consistently over 140 or bottom number over 90 let me know.  I would avoid monster drinks with them having so much caffeine  Keep drinking plenty of water.   Switch atenolol to evening to separate you blood pressure pills.     Follow-up: Return in about 4 months (around 3/29/2024).    SUBJECTIVE/OBJECTIVE:                          David Castro is a 58 year old male coming in for a follow-up visit from 10/30/23.       Reason for visit: blood pressure.    Allergies/ADRs: Reviewed in chart  Past Medical History: Reviewed in chart  Tobacco: He reports that he has never smoked. He has never used smokeless tobacco.  Alcohol: not currently using    Medication Adherence/Access: no issues reported    Hypertension   Lisinopril 20 mg one tablet daily - reduced lisinopril from 40 mg daily to 20 mg and stopped hydrochlorothiazide 25 mg at last visit  Atenolol 50 mg once daily  Patient reports no current medication side effects. He feels better since make medicine changes.  He has more energy and sleeps better also. He used to have occasion orthostatic hypotension but not anymore.  Patient does self-monitor blood pressure.  He has a home wrist cuff with the following readings: 119/74 pulse 73,  130/76 pulse 73, 138/86 pulse 67: 112/70 pulse 81, 122/80 pulse 79; 116/74 pulse 75; 119/83 pulse 84. He is resting for at least 5-10 minutes before checking.    He does have monster drinks and will have 3 cans per week. He did not have one today.     Home [employer] wrist cuff today: 146/97 pulse 77       BP Readings from Last 3 Encounters:   11/29/23 137/77   10/30/23 95/63   05/25/23 101/74     Pulse Readings from Last 3 Encounters:   11/29/23 83   10/30/23 79   05/25/23 87     Wt Readings from Last 5 Encounters:   11/29/23 192 lb 9.6 oz (87.4 kg)   10/30/23 194 lb 8 oz (88.2 kg)   05/25/23 208 lb (94.3 kg)   04/17/23 218 lb 1.6 oz (98.9 kg)   01/23/23 246 lb 14.4 oz (112 kg)     Today's Vitals: /77   Pulse 83   Wt 192 lb 9.6 oz (87.4 kg)   SpO2 98%   BMI 26.67 kg/m    ----------------    I spent 20 minutes with this patient today. All changes were made via collaborative practice agreement with CHIQUI Mike CNP. A copy of the visit note was provided to the patient's provider(s).    A summary of these recommendations was sent via [employer].    Yenifer De La Fuente, PharmD  Medication Therapy Management Pharmacist     Medication Therapy Recommendations  Hypertension goal BP (blood pressure) < 140/90    Current Medication: atenolol (TENORMIN) 50 MG tablet   Rationale: Incorrect administration - Dosage too low - Effectiveness   Recommendation: Change Administration Time   Status: Patient Agreed - Adherence/Education

## 2023-11-29 NOTE — PATIENT INSTRUCTIONS
"Recommendations from today's MTM visit:                                                       Keep checking your blood pressure three times a week. If you notice the top is consistently over 140 or bottom number over 90 let me know.  I would avoid monster drinks with them having so much caffeine  Keep drinking plenty of water.     Follow-up: Return in about 4 months (around 3/29/2024).    It was great speaking with you today.  I value your experience and would be very thankful for your time in providing feedback in our clinic survey. In the next few days, you may receive an email or text message from [employer] with a link to a survey related to your  clinical pharmacist.\"     To schedule another MTM appointment, please call the clinic directly or you may call the MTM scheduling line at [phone] or toll-free at [phone].     My Clinical Pharmacist's contact information:                                                      Please feel free to contact me with any questions or concerns you have.      Yenifer De La Fuente, PharmD  Medication Therapy Management Pharmacist  [facility] - Monday and Wednesday 7:30 - 4:00      "

## 2023-11-30 LAB
ANION GAP SERPL CALCULATED.3IONS-SCNC: 12 MMOL/L (ref 7–15)
BUN SERPL-MCNC: 20.4 MG/DL (ref 6–20)
CALCIUM SERPL-MCNC: 9.5 MG/DL (ref 8.6–10)
CHLORIDE SERPL-SCNC: 99 MMOL/L (ref 98–107)
CREAT SERPL-MCNC: 1.09 MG/DL (ref 0.67–1.17)
DEPRECATED HCO3 PLAS-SCNC: 23 MMOL/L (ref 22–29)
EGFRCR SERPLBLD CKD-EPI 2021: 80 ML/MIN/1.73M2
GLUCOSE SERPL-MCNC: 82 MG/DL (ref 70–99)
POTASSIUM SERPL-SCNC: 4.6 MMOL/L (ref 3.4–5.3)
SODIUM SERPL-SCNC: 134 MMOL/L (ref 135–145)

## 2023-12-18 ENCOUNTER — LAB (OUTPATIENT)
Dept: LAB | Facility: CLINIC | Age: 58
End: 2023-12-18
Payer: COMMERCIAL

## 2023-12-18 ENCOUNTER — ANTICOAGULATION THERAPY VISIT (OUTPATIENT)
Dept: ANTICOAGULATION | Facility: CLINIC | Age: 58
End: 2023-12-18

## 2023-12-18 DIAGNOSIS — Z86.711 HISTORY OF PULMONARY EMBOLISM: Primary | ICD-10-CM

## 2023-12-18 DIAGNOSIS — Z86.711 HISTORY OF PULMONARY EMBOLISM: ICD-10-CM

## 2023-12-18 DIAGNOSIS — Z86.718 PERSONAL HISTORY OF DVT (DEEP VEIN THROMBOSIS): ICD-10-CM

## 2023-12-18 DIAGNOSIS — Z79.01 LONG TERM CURRENT USE OF ANTICOAGULANTS WITH INR GOAL OF 2.0-3.0: ICD-10-CM

## 2023-12-18 LAB — INR BLD: 2.4 (ref 0.9–1.1)

## 2023-12-18 PROCEDURE — 36416 COLLJ CAPILLARY BLOOD SPEC: CPT

## 2023-12-18 PROCEDURE — 85610 PROTHROMBIN TIME: CPT

## 2023-12-18 NOTE — PROGRESS NOTES
ANTICOAGULATION MANAGEMENT     Schuyler Castro 58 year old male is on warfarin with therapeutic INR result. (Goal INR 2.0-3.0)    Recent labs: (last 7 days)     12/18/23  0734   INR 2.4*     MTM PharmD appointment on 11/29/23-no change in care plan  ASSESSMENT     Source(s): Chart Review and Patient/Caregiver Call     Warfarin doses taken: Warfarin taken as instructed  Diet: No new diet changes identified  Medication/supplement changes: None noted  New illness, injury, or hospitalization: No  Signs or symptoms of bleeding or clotting: No  Previous result: Therapeutic last 2(+) visits  Additional findings: Patient's INR has been therapeutic since 07/2023 so ok to start extending INR out (has ok in chart to go out 12 weeks).  Patient inquiring if ok to take a rock climbing class and cross country ski. Patient plans to wear a helmet for both and for the climbing class, he will be tethered.   Patient reports he has lost 77 pounds this year!   PLAN     Recommended plan for no diet, medication or health factor changes affecting INR     Dosing Instructions: Continue your current warfarin dose with next INR in 8 weeks       Summary  As of 12/18/2023      Full warfarin instructions:  5 mg every Tue, Sat; 7.5 mg all other days   Next INR check:  2/13/2024               Telephone call with David who verbalizes understanding and agrees to plan    Lab visit scheduled    Education provided:   Symptom monitoring: monitoring for bleeding signs and symptoms, when to seek medical attention/emergency care, and if you hit your head or have a bad fall seek emergency care    Plan made per [facility] anticoagulation protocol    Lyly Florian RN  Anticoagulation Clinic  12/18/2023    _______________________________________________________________________     Anticoagulation Episode Summary       Current INR goal:  2.0-3.0   TTR:  64.8% (1 y)   Target end date:  Indefinite   Send INR reminders to:  CASEY Cook    Personal  history of DVT (deep vein thrombosis) (Resolved) [Z86.718]  History of pulmonary embolism [Z86.711]  Long term current use of anticoagulants with INR goal of 2.0-3.0 [Z79.01]  Personal history of DVT (deep vein thrombosis) [Z86.718]             Comments:  6/7/21-OK for 12 week INR monitoring             Anticoagulation Care Providers       Provider Role Specialty Phone number    Rasheeda Juan MD Referring Family Medicine [phone]    Jess Kauffman APRN CNP Referring Family Medicine [phone]

## 2024-01-03 DIAGNOSIS — I10 HYPERTENSION GOAL BP (BLOOD PRESSURE) < 140/90: ICD-10-CM

## 2024-01-03 RX ORDER — LISINOPRIL 20 MG/1
20 TABLET ORAL DAILY
Qty: 30 TABLET | Refills: 2 | Status: SHIPPED | OUTPATIENT
Start: 2024-01-03 | End: 2024-02-05

## 2024-01-03 NOTE — TELEPHONE ENCOUNTER
Patient is calling for refill. He had to reschedule from 1/10/24 to 2/5/24 because his wife is starting chemo that week and he needs to take care of her. He will run short on his lisinopril.

## 2024-01-04 ENCOUNTER — PATIENT OUTREACH (OUTPATIENT)
Dept: CARE COORDINATION | Facility: CLINIC | Age: 59
End: 2024-01-04
Payer: COMMERCIAL

## 2024-01-18 ENCOUNTER — PATIENT OUTREACH (OUTPATIENT)
Dept: CARE COORDINATION | Facility: CLINIC | Age: 59
End: 2024-01-18
Payer: COMMERCIAL

## 2024-01-25 DIAGNOSIS — Z86.711 HISTORY OF PULMONARY EMBOLISM: ICD-10-CM

## 2024-01-25 DIAGNOSIS — Z86.718 PERSONAL HISTORY OF DVT (DEEP VEIN THROMBOSIS): ICD-10-CM

## 2024-01-25 RX ORDER — WARFARIN SODIUM 5 MG/1
TABLET ORAL
Qty: 124 TABLET | Refills: 0 | Status: SHIPPED | OUTPATIENT
Start: 2024-01-25 | End: 2024-04-02

## 2024-01-25 NOTE — TELEPHONE ENCOUNTER
ANTICOAGULATION MANAGEMENT:  Medication Refill    Anticoagulation Summary  As of 12/18/2023      Warfarin maintenance plan:  5 mg (5 mg x 1) every Tue, Sat; 7.5 mg (5 mg x 1.5) all other days   Next INR check:  2/13/2024   Target end date:  Indefinite    Indications    Personal history of DVT (deep vein thrombosis) (Resolved) [Z86.718]  History of pulmonary embolism [Z86.711]  Long term current use of anticoagulants with INR goal of 2.0-3.0 [Z79.01]  Personal history of DVT (deep vein thrombosis) [Z86.718]                 Anticoagulation Care Providers       Provider Role Specialty Phone number    Rasheeda Juan MD Referring Family Medicine [phone]    Jess Kauffman APRN CNP Referring Family Medicine [phone]            Refill Criteria    Visit with referring provider/group: Overdue for referring provider visit, last visit on 1/23/23 - however, patient has annual scheduled for 2/5/24    [facility] referral signed last signed: 02/14/2023; within last year: Yes    Lab monitoring not exceeding 2 weeks overdue: Yes    Schuyler meets all criteria for refill. Rx instructions and quantity supplied updated to match patient's current dosing plan.  90 day supply with 0 refills granted per [facility] protocol     Yadira Casas RN  Anticoagulation Clinic

## 2024-02-05 ENCOUNTER — OFFICE VISIT (OUTPATIENT)
Dept: FAMILY MEDICINE | Facility: CLINIC | Age: 59
End: 2024-02-05
Payer: COMMERCIAL

## 2024-02-05 VITALS
OXYGEN SATURATION: 100 % | RESPIRATION RATE: 20 BRPM | TEMPERATURE: 97.8 F | WEIGHT: 181 LBS | SYSTOLIC BLOOD PRESSURE: 130 MMHG | HEART RATE: 89 BPM | DIASTOLIC BLOOD PRESSURE: 82 MMHG | HEIGHT: 71 IN | BODY MASS INDEX: 25.34 KG/M2

## 2024-02-05 DIAGNOSIS — I10 HYPERTENSION GOAL BP (BLOOD PRESSURE) < 140/90: Primary | ICD-10-CM

## 2024-02-05 DIAGNOSIS — E11.65 TYPE 2 DIABETES MELLITUS WITH HYPERGLYCEMIA, WITHOUT LONG-TERM CURRENT USE OF INSULIN (H): ICD-10-CM

## 2024-02-05 DIAGNOSIS — Z12.11 SCREEN FOR COLON CANCER: ICD-10-CM

## 2024-02-05 PROBLEM — E66.01 MORBID OBESITY (H): Status: RESOLVED | Noted: 2018-05-25 | Resolved: 2024-02-05

## 2024-02-05 PROCEDURE — 99214 OFFICE O/P EST MOD 30 MIN: CPT | Performed by: NURSE PRACTITIONER

## 2024-02-05 RX ORDER — ATENOLOL 50 MG/1
50 TABLET ORAL DAILY
Qty: 90 TABLET | Refills: 3 | Status: SHIPPED | OUTPATIENT
Start: 2024-02-05

## 2024-02-05 RX ORDER — LISINOPRIL 20 MG/1
20 TABLET ORAL DAILY
Qty: 90 TABLET | Refills: 1 | Status: SHIPPED | OUTPATIENT
Start: 2024-02-05 | End: 2024-07-22

## 2024-02-05 RX ORDER — WARFARIN SODIUM 5 MG/1
TABLET ORAL
Qty: 124 TABLET | Refills: 0 | Status: CANCELLED | OUTPATIENT
Start: 2024-02-05

## 2024-02-05 RX ORDER — ORAL SEMAGLUTIDE 14 MG/1
14 TABLET ORAL DAILY
Qty: 90 TABLET | Refills: 1 | Status: SHIPPED | OUTPATIENT
Start: 2024-02-05 | End: 2024-07-22

## 2024-02-05 RX ORDER — ROSUVASTATIN CALCIUM 20 MG/1
20 TABLET, COATED ORAL DAILY
Qty: 90 TABLET | Refills: 3 | Status: CANCELLED | OUTPATIENT
Start: 2024-02-05

## 2024-02-05 ASSESSMENT — PAIN SCALES - GENERAL: PAINLEVEL: NO PAIN (0)

## 2024-02-05 NOTE — PROGRESS NOTES
"  Assessment & Plan     Hypertension goal BP (blood pressure) < 140/90  Chronic, initially elevated, but did improve on recheck.  His home readings are well controlled.  Will have him continue on his current medications.  He will see MTM in 2 months for lab.   - atenolol (TENORMIN) 50 MG tablet; Take 1 tablet (50 mg) by mouth daily  - lisinopril (ZESTRIL) 20 MG tablet; Take 1 tablet (20 mg) by mouth daily (Replaces lisinopril/hydrochlorothiazide)    Type 2 diabetes mellitus with hyperglycemia, without long-term current use of insulin (H)  Chronic, well controlled.  Congratulated him on his success.  Will have him continue on his current medications and follow-up with MTM in 2 mos.   - empagliflozin (JARDIANCE) 10 MG TABS tablet; Take 1 tablet (10 mg) by mouth daily  - Semaglutide (RYBELSUS) 14 MG tablet; Take 14 mg by mouth daily    Screen for colon cancer  Incomplete prep on his last colonoscopy; needs to repeat.   - Colonoscopy Screening  Referral; Future        BMI  Estimated body mass index is 25.24 kg/m  as calculated from the following:    Height as of this encounter: 1.803 m (5' 11\").    Weight as of this encounter: 82.1 kg (181 lb).   Weight management plan: Discussed healthy diet and exercise guidelines      Follow-up 1 year     Subjective   David is a 58 year old, presenting for the following health issues:  Diabetes and Recheck Medication        2/5/2024     9:41 AM   Additional Questions   Roomed by Raven         2/5/2024     9:41 AM   Patient Reported Additional Medications   Patient reports taking the following new medications none     Via the Health Maintenance questionnaire, the patient has reported the following services have been completed -Colonscopy, this information has been sent to the abstraction team.  History of Present Illness       Diabetes:   He presents for follow up of diabetes.  He is checking home blood glucose two times daily.   He checks blood glucose before meals, after " "meals and at bedtime.  Blood glucose is never over 200 and never under 70. He is aware of hypoglycemia symptoms including none.    He has no concerns regarding his diabetes at this time.  He is having weight loss.            Hypertension: He presents for follow up of hypertension.  He does check blood pressure  regularly outside of the clinic. Outpatient blood pressures have not been over 140/90. He follows a low salt diet.     He eats 2-3 servings of fruits and vegetables daily.He consumes 0 sweetened beverage(s) daily.He exercises with enough effort to increase his heart rate 60 or more minutes per day.  He exercises with enough effort to increase his heart rate 5 days per week.   He is taking medications regularly.   Home BP: 126/79  No CP, SOB, palpitations, edema. Lisinopril was decreased with MTM due to low BP's.  He does check BP at home and SBP run 110-120s.    BP Readings from Last 6 Encounters:   02/05/24 130/82   11/29/23 137/77   10/30/23 95/63   05/25/23 101/74   04/17/23 119/74   01/23/23 134/70     From a diabetic standpoint he has exceptionally well getting this under control. David has lost 80#  over the last year working on diet and exercise. He feels great.  Plans to retire in the next couple years.      Review of Systems  Constitutional, HEENT, cardiovascular, pulmonary, gi and gu systems are negative, except as otherwise noted.      Objective    /82   Pulse 89   Temp 97.8  F (36.6  C) (Oral)   Resp 20   Ht 1.803 m (5' 11\")   Wt 82.1 kg (181 lb)   SpO2 100%   BMI 25.24 kg/m    Body mass index is 25.24 kg/m .  Physical Exam   GENERAL: alert and no distress  NECK: no adenopathy, no asymmetry, masses, or scars  RESP: lungs clear to auscultation - no rales, rhonchi or wheezes  CV: regular rate and rhythm, normal S1 S2, no S3 or S4, no murmur, click or rub, no peripheral edema  MS: no gross musculoskeletal defects noted, no edema  PSYCH: mentation appears normal, affect " normal/bright  Diabetic foot exam: normal DP and PT pulses, no trophic changes or ulcerative lesions, normal sensory exam, and normal monofilament exam    No results found for this or any previous visit (from the past 24 hour(s)).        Signed Electronically by: CHIQUI Mike CNP

## 2024-02-13 ENCOUNTER — ANTICOAGULATION THERAPY VISIT (OUTPATIENT)
Dept: ANTICOAGULATION | Facility: CLINIC | Age: 59
End: 2024-02-13

## 2024-02-13 ENCOUNTER — DOCUMENTATION ONLY (OUTPATIENT)
Dept: ANTICOAGULATION | Facility: CLINIC | Age: 59
End: 2024-02-13

## 2024-02-13 ENCOUNTER — LAB (OUTPATIENT)
Dept: LAB | Facility: CLINIC | Age: 59
End: 2024-02-13
Payer: COMMERCIAL

## 2024-02-13 DIAGNOSIS — Z86.711 HISTORY OF PULMONARY EMBOLISM: ICD-10-CM

## 2024-02-13 DIAGNOSIS — Z86.711 HISTORY OF PULMONARY EMBOLISM: Primary | ICD-10-CM

## 2024-02-13 DIAGNOSIS — Z86.718 PERSONAL HISTORY OF DVT (DEEP VEIN THROMBOSIS): ICD-10-CM

## 2024-02-13 DIAGNOSIS — Z79.01 LONG TERM CURRENT USE OF ANTICOAGULANTS WITH INR GOAL OF 2.0-3.0: ICD-10-CM

## 2024-02-13 LAB — INR BLD: 2.7 (ref 0.9–1.1)

## 2024-02-13 PROCEDURE — 36416 COLLJ CAPILLARY BLOOD SPEC: CPT

## 2024-02-13 PROCEDURE — 85610 PROTHROMBIN TIME: CPT

## 2024-02-13 NOTE — PROGRESS NOTES
ANTICOAGULATION CLINIC REFERRAL RENEWAL REQUEST       An annual renewal order is required for all patients referred to [facility].?  Please review and sign the pended referral order for Schuyler Castro.       ANTICOAGULATION SUMMARY      Warfarin indication(s)   DVT and PE    Mechanical heart valve present?  NO       Current goal range   INR: 2.0-3.0     Goal appropriate for indication? Goal INR 2-3, standard for indication(s) above     Time in Therapeutic Range (TTR)  (Goal > 60%) 69%       Office visit with referring provider's group within last year yes on 02/05/24       Lyly Florian RN  [facility]

## 2024-02-13 NOTE — PROGRESS NOTES
"ANTICOAGULATION MANAGEMENT     Schuyler Castro 58 year old male is on warfarin with therapeutic INR result. (Goal INR 2.0-3.0)    Recent labs: (last 7 days)     02/13/24  0751   INR 2.7*       ASSESSMENT     Source(s): Chart Review and Patient/Caregiver Call     Warfarin doses taken: Warfarin taken as instructed  Diet: No new diet changes identified  Medication/supplement changes: None noted  New illness, injury, or hospitalization: No  Signs or symptoms of bleeding or clotting: No  Previous result: Therapeutic last 2(+) visits  Additional findings:  2/5/24 office visit--no change in care plan, referral for colonoscopy given  -patient reports that he will schedule this \"sometime this year\"       PLAN     Recommended plan for no diet, medication or health factor changes affecting INR     Dosing Instructions: Continue your current warfarin dose with next INR in 8 weeks       Summary  As of 2/13/2024      Full warfarin instructions:  5 mg every Tue, Sat; 7.5 mg all other days   Next INR check:  4/9/2024               Telephone call with David who verbalizes understanding and agrees to plan    Lab visit scheduled    Education provided:   Importance of notifying anticoagulation clinic for: upcoming surgeries and procedures 2 weeks in advance    Plan made per [facility] anticoagulation protocol    Lyly Florian, RN  Anticoagulation Clinic  2/13/2024    _______________________________________________________________________     Anticoagulation Episode Summary       Current INR goal:  2.0-3.0   TTR:  68.8% (1 y)   Target end date:  Indefinite   Send INR reminders to:  CASEY GARCIA    Indications    Personal history of DVT (deep vein thrombosis) (Resolved) [Z86.718]  History of pulmonary embolism [Z86.711]  Long term current use of anticoagulants with INR goal of 2.0-3.0 [Z79.01]  Personal history of DVT (deep vein thrombosis) [Z86.718]             Comments:  6/7/21-OK for 12 week INR monitoring             Anticoagulation " Care Providers       Provider Role Specialty Phone number    Rasheeda Juan MD Referring Family Medicine [phone]    Jess Kauffman APRN CNP Referring Family Medicine [phone]

## 2024-02-17 NOTE — TELEPHONE ENCOUNTER
PATIENT TO OR VIA CART IN STABLE CONDITION.   Routing refill request to provider for review/approval because:  Labs out of range:  CR  Elevated BP    Joanie Champagne RN on 10/26/2021 at 11:40 AM

## 2024-03-20 NOTE — TELEPHONE ENCOUNTER
Date of Service: 03-20-24 @ 11:56    Patient is a 78y old  Female who presents with a chief complaint of A fib with RVR (19 Mar 2024 21:36)      Any change in ROS: She seems pretty good:  wheezing is much better: on room air     MEDICATIONS  (STANDING):  apixaban 5 milliGRAM(s) Oral every 12 hours  aspirin enteric coated 81 milliGRAM(s) Oral daily  budesonide 160 MICROgram(s)/formoterol 4.5 MICROgram(s) Inhaler 2 Puff(s) Inhalation two times a day  calcium carbonate 1250 mG  + Vitamin D (OsCal 500 + D) 1 Tablet(s) Oral two times a day  dextrose 5%. 1000 milliLiter(s) (50 mL/Hr) IV Continuous <Continuous>  dextrose 5%. 1000 milliLiter(s) (100 mL/Hr) IV Continuous <Continuous>  dextrose 50% Injectable 25 Gram(s) IV Push once  dextrose 50% Injectable 12.5 Gram(s) IV Push once  dextrose 50% Injectable 25 Gram(s) IV Push once  glucagon  Injectable 1 milliGRAM(s) IntraMuscular once  insulin glargine Injectable (LANTUS) 50 Unit(s) SubCutaneous at bedtime  insulin lispro (ADMELOG) corrective regimen sliding scale   SubCutaneous three times a day before meals  insulin lispro (ADMELOG) corrective regimen sliding scale   SubCutaneous at bedtime  insulin lispro Injectable (ADMELOG) 15 Unit(s) SubCutaneous three times a day before meals  isosorbide   mononitrate ER Tablet (IMDUR) 30 milliGRAM(s) Oral daily  losartan 100 milliGRAM(s) Oral daily  methylPREDNISolone sodium succinate Injectable 20 milliGRAM(s) IV Push every 8 hours  metoprolol tartrate 25 milliGRAM(s) Oral two times a day  oseltamivir 75 milliGRAM(s) Oral two times a day  pantoprazole    Tablet 40 milliGRAM(s) Oral before breakfast  simvastatin 20 milliGRAM(s) Oral at bedtime    MEDICATIONS  (PRN):  dextrose Oral Gel 15 Gram(s) Oral once PRN Blood Glucose LESS THAN 70 milliGRAM(s)/deciliter  dextrose Oral Gel 15 Gram(s) Oral once PRN Blood Glucose LESS THAN 70 milliGRAM(s)/deciliter  guaiFENesin Oral Liquid (Sugar-Free) 200 milliGRAM(s) Oral every 6 hours PRN Cough  hydrocodone/homatropine Syrup 5 milliLiter(s) Oral every 6 hours PRN severe cough    Vital Signs Last 24 Hrs  T(C): 36.7 (20 Mar 2024 11:14), Max: 36.8 (19 Mar 2024 12:37)  T(F): 98 (20 Mar 2024 11:14), Max: 98.3 (19 Mar 2024 17:50)  HR: 82 (20 Mar 2024 11:14) (76 - 89)  BP: 130/72 (20 Mar 2024 11:14) (130/72 - 146/99)  BP(mean): --  RR: 18 (20 Mar 2024 11:15) (16 - 18)  SpO2: 93% (20 Mar 2024 11:15) (93% - 100%)    Parameters below as of 20 Mar 2024 11:15  Patient On (Oxygen Delivery Method): room air, with ambulation in hallway        I&O's Summary        Physical Exam:   GENERAL: NAD, well-groomed, well-developed  HEENT: ELIEZER/   Atraumatic, Normocephalic  ENMT: No tonsillar erythema, exudates, or enlargement; Moist mucous membranes, Good dentition, No lesions  NECK: Supple, No JVD, Normal thyroid  CHEST/LUNG: Clear to auscultaion- no wheezing  CVS: Regular rate and rhythm; No murmurs, rubs, or gallops  GI: : Soft, Nontender, Nondistended; Bowel sounds present  NERVOUS SYSTEM:  Alert & Oriented X3  EXTREMITIES: - edema  LYMPH: No lymphadenopathy noted  SKIN: No rashes or lesions  ENDOCRINOLOGY: No Thyromegaly  PSYCH: Appropriate    Labs:  23                            8.7    11.46 )-----------( 262      ( 20 Mar 2024 06:10 )             29.2                         9.2    8.50  )-----------( 239      ( 19 Mar 2024 06:50 )             30.1                         9.0    8.52  )-----------( 223      ( 17 Mar 2024 01:31 )             30.0     03-20    140  |  104  |  34<H>  ----------------------------<  180<H>  3.9   |  21<L>  |  0.72  03-19    139  |  102  |  31<H>  ----------------------------<  281<H>  4.2   |  22  |  0.73  03-18    140  |  102  |  17  ----------------------------<  137<H>  3.8   |  24  |  0.73  03-17    138  |  98  |  15  ----------------------------<  260<H>  4.4   |  22  |  0.71  03-17    133<L>  |  94<L>  |  17  ----------------------------<  114<H>  3.9   |  19<L>  |  0.66    Ca    9.3      20 Mar 2024 06:10  Ca    9.5      19 Mar 2024 06:50  Phos  3.3     03-20  Mg     2.10     03-20    TPro  7.7  /  Alb  4.1  /  TBili  0.3  /  DBili  x   /  AST  39<H>  /  ALT  48<H>  /  AlkPhos  77  03-19  TPro  7.7  /  Alb  3.9  /  TBili  0.4  /  DBili  x   /  AST  44<H>  /  ALT  40<H>  /  AlkPhos  76  03-18  TPro  7.9  /  Alb  4.1  /  TBili  0.4  /  DBili  x   /  AST  30  /  ALT  38<H>  /  AlkPhos  78  03-17  TPro  7.8  /  Alb  4.0  /  TBili  0.3  /  DBili  x   /  AST  44<H>  /  ALT  43<H>  /  AlkPhos  77  03-17    CAPILLARY BLOOD GLUCOSE      POCT Blood Glucose.: 188 mg/dL (20 Mar 2024 06:58)  POCT Blood Glucose.: 327 mg/dL (19 Mar 2024 21:42)  POCT Blood Glucose.: 301 mg/dL (19 Mar 2024 20:31)  POCT Blood Glucose.: 329 mg/dL (19 Mar 2024 17:16)  POCT Blood Glucose.: 363 mg/dL (19 Mar 2024 16:14)  POCT Blood Glucose.: 330 mg/dL (19 Mar 2024 12:09)      LIVER FUNCTIONS - ( 19 Mar 2024 06:50 )  Alb: 4.1 g/dL / Pro: 7.7 g/dL / ALK PHOS: 77 U/L / ALT: 48 U/L / AST: 39 U/L / GGT: x             Urinalysis Basic - ( 20 Mar 2024 06:10 )    Color: x / Appearance: x / SG: x / pH: x  Gluc: 180 mg/dL / Ketone: x  / Bili: x / Urobili: x   Blood: x / Protein: x / Nitrite: x   Leuk Esterase: x / RBC: x / WBC x   Sq Epi: x / Non Sq Epi: x / Bacteria: x      Procalcitonin, Serum: 0.09 ng/mL (03-17 @ 15:15)  Lactate, Blood: 2.6 mmol/L (03-19 @ 06:50)  Lactate, Blood: 2.4 mmol/L (03-18 @ 07:53)  Lactate, Blood: 4.5 mmol/L (03-17 @ 15:15)  Lactate, Blood: 5.3 mmol/L (03-17 @ 06:45)        RECENT CULTURES:        RESPIRATORY CULTURES:      rad< from: CT Angio Chest PE Protocol w/ IV Cont (03.17.24 @ 05:49) >    LUNGS AND AIRWAYS: Patent central airways.  There are diffuse mild   peripheral reticular markings which may represent a fibrotic process or   mild pulmonary edema. Correlate clinically.  PLEURA: No pleural effusion.  MEDIASTINUM AND CHRISTELLE: No lymphadenopathy.  VESSELS: No pulmonary embolism.  HEART: Heart size is normal. No pericardial effusion. Coronary   calcifications. Mitral valve annulus calcification  CHEST WALL AND LOWER NECK: Within normal limits.  VISUALIZED UPPER ABDOMEN: Left renal cyst. Small hiatal hernia. Right   lobe hepatic hypodensity too small to characterize.  BONES: Generative changes.    IMPRESSION:    No pulmonary embolism.      --- End of Report ---            OVI BARTON MD; Attending Radiologist  This document has been electronically signed. Mar 17 2024  6:02AM    < end of copied text >  < from: Xray Chest 2 Views PA/Lat (03.17.24 @ 00:43) >  ACC: [id number] EXAM:  XR CHEST PA LAT 2V   ORDERED BY: MARV GALEANO     PROCEDURE DATE:  03/17/2024          INTERPRETATION:  CLINICAL INDICATION: Shortness of breath    EXAM: PA and lateral views of the chest.    COMPARISON: Chest radiograph from 10/21/2023    FINDINGS:    Bilateral prominent bronchovascular markings.  There is no pleural effusion or pneumothorax.  The heart is not well evaluated in this position  The visualized osseous structures demonstrate no acute pathology.    IMPRESSION:  Bilateral prominent bronchovascular markings, consistent with pulmonary   vascular congestion.    --- End of Report ---          DIXIE CHANG MD; Resident Radiologist  This document has been electronically signed.  OVI ZAMBRANO MD; AttendingRadiologist  This document has been electronically signed. Mar 17 2024  8:49AM    < end of copied text >      Studies  Chest X-RAY  CT SCAN Chest   Venous Dopplers: LE:   CT Abdomen  Others    ct< from: CT Angio Chest PE Protocol w/ IV Cont (03.17.24 @ 05:49) >  ACC: [id number] EXAM:  CT ANGIO CHEST PULM [facility]   ORDERED BY: MARV GALEANO     PROCEDURE DATE:  03/17/2024          INTERPRETATION:  CLINICAL INFORMATION: Shortness of breath and tachypnea,   tachycardia    COMPARISON: CT abdomen 10/12/2009.    CONTRAST/COMPLICATIONS:  IV Contrast: Omnipaque 350  49 cc administered   51 cc discarded  Oral Contrast: NONE  Complications: None reported at time of study completion    PROCEDURE:  CT Angiography of the Chest.  Sagittal and coronal reformats wereperformed as well as 3D (MIP)   reconstructions.    FINDINGS:    LUNGS AND AIRWAYS: Patent central airways.  There are diffuse mild   peripheral reticular markings which may represent a fibrotic process or   mild pulmonary edema. Correlate clinically.  PLEURA: No pleural effusion.  MEDIASTINUM AND CHRISTELLE: No lymphadenopathy.  VESSELS: No pulmonary embolism.  HEART: Heart size is normal. No pericardial effusion. Coronary   calcifications. Mitral valve annulus calcification  CHEST WALL AND LOWER NECK: Within normal limits.  VISUALIZED UPPER ABDOMEN: Left renal cyst. Small hiatal hernia. Right   lobe hepatic hypodensity too small to characterize.  BONES: Generative changes.    IMPRESSION:    No pulmonary embolism.      --- End of Report ---            OVI BARTON MD; Attending Radiologist  This document has been electronically signed. Mar 17 2024  6:02AM    < end of copied text >             Has lab and follow-up in one month.     Jess Kauffman CNP     Patient seen and examined at bedside.    Overnight Events:       REVIEW OF SYSTEMS:  Constitutional:     [ ] negative [ ] fevers [ ] chills [ ] weight loss [ ] weight gain  HEENT:                  [ ] negative [ ] dry eyes [ ] eye irritation [ ] postnasal drip [ ] nasal congestion  CV:                         [ ] negative  [ ] chest pain [ ] orthopnea [ ] palpitations [ ] murmur  Resp:                     [ ] negative [ ] cough [ ] shortness of breath [ ] dyspnea [ ] wheezing [ ] sputum [ ]hemoptysis  GI:                          [ ] negative [ ] nausea [ ] vomiting [ ] diarrhea [ ] constipation [ ] abd pain [ ] dysphagia   :                        [ ] negative [ ] dysuria [ ] nocturia [ ] hematuria [ ] increased urinary frequency  Musculoskeletal: [ ] negative [ ] back pain [ ] myalgias [ ] arthralgias [ ] fracture  Skin:                       [ ] negative [ ] rash [ ] itch  Neurological:        [ ] negative [ ] headache [ ] dizziness [ ] syncope [ ] weakness [ ] numbness  Psychiatric:           [ ] negative [ ] anxiety [ ] depression  Endocrine:            [ ] negative [ ] diabetes [ ] thyroid problem  Heme/Lymph:      [ ] negative [ ] anemia [ ] bleeding problem  Allergic/Immune: [ ] negative [ ] itchy eyes [ ] nasal discharge [ ] hives [ ] angioedema    [ ] All other systems negative  [ ] Unable to assess ROS due to    Current Meds:  apixaban 5 milliGRAM(s) Oral every 12 hours  aspirin enteric coated 81 milliGRAM(s) Oral daily  budesonide 160 MICROgram(s)/formoterol 4.5 MICROgram(s) Inhaler 2 Puff(s) Inhalation two times a day  calcium carbonate 1250 mG  + Vitamin D (OsCal 500 + D) 1 Tablet(s) Oral two times a day  dextrose 5%. 1000 milliLiter(s) IV Continuous <Continuous>  dextrose 5%. 1000 milliLiter(s) IV Continuous <Continuous>  dextrose 50% Injectable 25 Gram(s) IV Push once  dextrose 50% Injectable 12.5 Gram(s) IV Push once  dextrose 50% Injectable 25 Gram(s) IV Push once  dextrose Oral Gel 15 Gram(s) Oral once PRN  dextrose Oral Gel 15 Gram(s) Oral once PRN  glucagon  Injectable 1 milliGRAM(s) IntraMuscular once  guaiFENesin Oral Liquid (Sugar-Free) 200 milliGRAM(s) Oral every 6 hours PRN  hydrocodone/homatropine Syrup 5 milliLiter(s) Oral every 6 hours PRN  insulin glargine Injectable (LANTUS) 50 Unit(s) SubCutaneous at bedtime  insulin lispro (ADMELOG) corrective regimen sliding scale   SubCutaneous at bedtime  insulin lispro (ADMELOG) corrective regimen sliding scale   SubCutaneous three times a day before meals  insulin lispro Injectable (ADMELOG) 15 Unit(s) SubCutaneous three times a day before meals  isosorbide   mononitrate ER Tablet (IMDUR) 30 milliGRAM(s) Oral daily  losartan 100 milliGRAM(s) Oral daily  methylPREDNISolone sodium succinate Injectable 20 milliGRAM(s) IV Push every 8 hours  metoprolol tartrate 25 milliGRAM(s) Oral two times a day  oseltamivir 75 milliGRAM(s) Oral two times a day  pantoprazole    Tablet 40 milliGRAM(s) Oral before breakfast  simvastatin 20 milliGRAM(s) Oral at bedtime      PAST MEDICAL & SURGICAL HISTORY:  Atherosclerosis of coronary artery  Coronary artery disease      Type 2 diabetes mellitus  Diabetes      Hypercholesterolemia  Hypercholesterolemia      Essential hypertension  Hypertension      Other postprocedural status  umbilical surgery      Other postprocedural status  for treatment of diverticulitis      Status post total hysterectomy  at 23 years old          Vitals:  T(F): 98.2 (03-18), Max: 98.2 (03-18)  HR: 92 (03-18) (79 - 95)  BP: 132/58 (03-18) (102/67 - 137/76)  RR: 21 (03-18)  SpO2: 95% (03-18)  I&O's Summary      Physical Exam:  Appearance: No acute distress; well appearing  Eyes: PERRL, EOMI, pink conjunctiva  HENT: Normal oral mucosa  Cardiovascular: RRR, S1, S2, no murmurs, rubs, or gallops; no edema; no JVD  Respiratory: Clear to auscultation bilaterally  Gastrointestinal: soft, non-tender, non-distended with normal bowel sounds  Musculoskeletal: No clubbing; no joint deformity   Neurologic: Non-focal  Lymphatic: No lymphadenopathy  Psychiatry: AAOx3, mood & affect appropriate  Skin: No rashes, ecchymoses, or cyanosis                          9.0    8.52  )-----------( 223      ( 17 Mar 2024 01:31 )             30.0     03-18    140  |  102  |  17  ----------------------------<  137<H>  3.8   |  24  |  0.73    Ca    9.3      18 Mar 2024 07:53    TPro  7.7  /  Alb  3.9  /  TBili  0.4  /  DBili  x   /  AST  44<H>  /  ALT  40<H>  /  AlkPhos  76  03-18            Total Cholesterol: 107  LDL: --  HDL: 31  T        New ECG(s): Personally reviewed    Echo:    Stress Testing:     Cath:    Imaging:    Interpretation of Telemetry:  SR with bursts of AFib 79 yo F with h/o HTN, DM, HLD presenting with cough and shortness of breath x 2 weeks. Pt reports having a persistent cough that has gotten worse. Denies any fevers, no sick contacts or recent travel. Pt states she was given cough medicine and an inhaler by her pcp but this has not helped. In ED pt was found to be in A fib with RVR, HR in 130s. Denies h/o A fib. She denies any chest pain or palpitations, no orthopnea or leg swelling.     In ED pt was given Diltiazem, Eliquis, Imdur, Losartan, Solumedrol   VS:  129/68  131  98.1  18  95% on RA (17 Mar 2024 15:59)    24 @ 15:20  PAST MEDICAL & SURGICAL HISTORY:  Atherosclerosis of coronary artery  Coronary artery disease      Type 2 diabetes mellitus  Diabetes      Hypercholesterolemia  Hypercholesterolemia      Essential hypertension  Hypertension      Other postprocedural status  umbilical surgery      Other postprocedural status  for treatment of diverticulitis      Status post total hysterectomy  at 23 years old          Review of Systems:   CONSTITUTIONAL: No fever, weight loss, or fatigue  EYES: No eye pain, visual disturbances, or discharge  ENMT:  No difficulty hearing, tinnitus, vertigo; No sinus or throat pain  NECK: No pain or stiffness  BREASTS: No pain, masses, or nipple discharge  RESPIRATORY: ++ cough, wheezing, no chills or hemoptysis; ++ shortness of breath  CARDIOVASCULAR: No chest pain, palpitations, dizziness, or leg swelling  GASTROINTESTINAL: No abdominal or epigastric pain. No nausea, vomiting, or hematemesis; No diarrhea or constipation. No melena or hematochezia.  GENITOURINARY: No dysuria, frequency, hematuria, or incontinence  NEUROLOGICAL: No headaches, memory loss, loss of strength, numbness, or tremors  SKIN: No itching, burning, rashes, or lesions   LYMPH NODES: No enlarged glands  ENDOCRINE: No heat or cold intolerance; No hair loss  MUSCULOSKELETAL: No joint pain or swelling; No muscle, back, or extremity pain  PSYCHIATRIC: No depression, anxiety, mood swings, or difficulty sleeping  HEME/LYMPH: No easy bruising, or bleeding gums  ALLERY AND IMMUNOLOGIC: No hives or eczema    Allergies    IV Contrast (Other)    Intolerances        Social History:     FAMILY HISTORY:  Family history of cardiovascular disease  alive; 64 years old    Family history of ischemic heart disease   at 69 from MI        MEDICATIONS  (STANDING):  apixaban 5 milliGRAM(s) Oral every 12 hours  aspirin enteric coated 81 milliGRAM(s) Oral daily  budesonide 160 MICROgram(s)/formoterol 4.5 MICROgram(s) Inhaler 2 Puff(s) Inhalation two times a day  calcium carbonate 1250 mG  + Vitamin D (OsCal 500 + D) 1 Tablet(s) Oral two times a day  dextrose 5%. 1000 milliLiter(s) (100 mL/Hr) IV Continuous <Continuous>  dextrose 5%. 1000 milliLiter(s) (50 mL/Hr) IV Continuous <Continuous>  dextrose 50% Injectable 25 Gram(s) IV Push once  dextrose 50% Injectable 12.5 Gram(s) IV Push once  dextrose 50% Injectable 25 Gram(s) IV Push once  glucagon  Injectable 1 milliGRAM(s) IntraMuscular once  insulin glargine Injectable (LANTUS) 50 Unit(s) SubCutaneous at bedtime  insulin lispro (ADMELOG) corrective regimen sliding scale   SubCutaneous at bedtime  insulin lispro (ADMELOG) corrective regimen sliding scale   SubCutaneous three times a day before meals  insulin lispro Injectable (ADMELOG) 15 Unit(s) SubCutaneous three times a day before meals  isosorbide   mononitrate ER Tablet (IMDUR) 30 milliGRAM(s) Oral daily  losartan 100 milliGRAM(s) Oral daily  methylPREDNISolone sodium succinate Injectable 20 milliGRAM(s) IV Push every 8 hours  metoprolol tartrate 25 milliGRAM(s) Oral two times a day  oseltamivir 75 milliGRAM(s) Oral two times a day  pantoprazole    Tablet 40 milliGRAM(s) Oral before breakfast  simvastatin 20 milliGRAM(s) Oral at bedtime    MEDICATIONS  (PRN):  dextrose Oral Gel 15 Gram(s) Oral once PRN Blood Glucose LESS THAN 70 milliGRAM(s)/deciliter  dextrose Oral Gel 15 Gram(s) Oral once PRN Blood Glucose LESS THAN 70 milliGRAM(s)/deciliter  guaiFENesin Oral Liquid (Sugar-Free) 200 milliGRAM(s) Oral every 6 hours PRN Cough  hydrocodone/homatropine Syrup 5 milliLiter(s) Oral every 6 hours PRN severe cough      Vital Signs Last 24 Hrs  T(C): 36.8 (18 Mar 2024 13:20), Max: 36.8 (18 Mar 2024 13:20)  T(F): 98.2 (18 Mar 2024 13:20), Max: 98.2 (18 Mar 2024 13:20)  HR: 82 (18 Mar 2024 13:20) (79 - 95)  BP: 102/67 (18 Mar 2024 13:20) (102/67 - 137/76)  BP(mean): --  RR: 21 (18 Mar 2024 13:20) (20 - 21)  SpO2: 95% (18 Mar 2024 13:20) (95% - 99%)    Parameters below as of 18 Mar 2024 13:20  Patient On (Oxygen Delivery Method): room air      CAPILLARY BLOOD GLUCOSE      POCT Blood Glucose.: 183 mg/dL (18 Mar 2024 12:49)  POCT Blood Glucose.: 169 mg/dL (18 Mar 2024 09:01)  POCT Blood Glucose.: 154 mg/dL (17 Mar 2024 21:39)  POCT Blood Glucose.: 214 mg/dL (17 Mar 2024 17:08)    I&O's Summary      PHYSICAL EXAM:  GENERAL: NAD, well-developed  HEAD:  Atraumatic, Normocephalic  EYES: EOMI, PERRLA, conjunctiva and sclera clear  NECK: Supple, No JVD  CHEST/LUNG: Clear to auscultation bilaterally; No wheeze  HEART: Regular rate and rhythm; No murmurs, rubs, or gallops  ABDOMEN: Soft, Nontender, Nondistended; Bowel sounds present  EXTREMITIES:  2+ Peripheral Pulses, No clubbing, cyanosis, or edema  PSYCH: AAOx3  NEUROLOGY: non-focal  SKIN: No rashes or lesions    LABS:                        9.0    8.52  )-----------( 223      ( 17 Mar 2024 01:31 )             30.0     03-18    140  |  102  |  17  ----------------------------<  137<H>  3.8   |  24  |  0.73    Ca    9.3      18 Mar 2024 07:53    TPro  7.7  /  Alb  3.9  /  TBili  0.4  /  DBili  x   /  AST  44<H>  /  ALT  40<H>  /  AlkPhos  76  03-18          Urinalysis Basic - ( 18 Mar 2024 07:53 )    Color: x / Appearance: x / SG: x / pH: x  Gluc: 137 mg/dL / Ketone: x  / Bili: x / Urobili: x   Blood: x / Protein: x / Nitrite: x   Leuk Esterase: x / RBC: x / WBC x   Sq Epi: x / Non Sq Epi: x / Bacteria: x    < from: CT Angio Chest PE Protocol w/ IV Cont (24 @ 05:49) >  FINDINGS:    LUNGS AND AIRWAYS: Patent central airways.  There are diffuse mild   peripheral reticular markings which may represent a fibrotic process or   mild pulmonary edema. Correlate clinically.  PLEURA: No pleural effusion.  MEDIASTINUM AND CHRISTELLE: No lymphadenopathy.  VESSELS: No pulmonary embolism.  HEART: Heart size is normal. No pericardial effusion. Coronary   calcifications. Mitral valve annulus calcification  CHEST WALL AND LOWER NECK: Within normal limits.  VISUALIZED UPPER ABDOMEN: Left renal cyst. Small hiatal hernia. Right   lobe hepatic hypodensity too small to characterize.  BONES: Generative changes.    IMPRESSION:    No pulmonary embolism.    < end of copied text >  < from: Xray Chest 2 Views PA/Lat (24 @ 00:43) >  FINDINGS:    Bilateral prominent bronchovascular markings.  There is no pleural effusion or pneumothorax.  The heart is not well evaluated in this position  The visualized osseous structures demonstrate no acute pathology.    IMPRESSION:  Bilateral prominent bronchovascular markings, consistent with pulmonary   vascular congestion.      < end of copied text >      RADIOLOGY & ADDITIONAL TESTS:    Imaging Personally Reviewed:    Consultant(s) Notes Reviewed:      Care Discussed with Consultants/Other Providers:   Date of Service: 03-19-24 @ 12:37    Patient is a 78y old  Female who presents with a chief complaint of A fib with RVR (18 Mar 2024 19:30)      Any change in ROS: She feels much better:  no sob:  no cough : no phlegm     MEDICATIONS  (STANDING):  apixaban 5 milliGRAM(s) Oral every 12 hours  aspirin enteric coated 81 milliGRAM(s) Oral daily  budesonide 160 MICROgram(s)/formoterol 4.5 MICROgram(s) Inhaler 2 Puff(s) Inhalation two times a day  calcium carbonate 1250 mG  + Vitamin D (OsCal 500 + D) 1 Tablet(s) Oral two times a day  dextrose 5%. 1000 milliLiter(s) (50 mL/Hr) IV Continuous <Continuous>  dextrose 5%. 1000 milliLiter(s) (100 mL/Hr) IV Continuous <Continuous>  dextrose 50% Injectable 12.5 Gram(s) IV Push once  dextrose 50% Injectable 25 Gram(s) IV Push once  dextrose 50% Injectable 25 Gram(s) IV Push once  glucagon  Injectable 1 milliGRAM(s) IntraMuscular once  insulin glargine Injectable (LANTUS) 50 Unit(s) SubCutaneous at bedtime  insulin lispro (ADMELOG) corrective regimen sliding scale   SubCutaneous three times a day before meals  insulin lispro (ADMELOG) corrective regimen sliding scale   SubCutaneous at bedtime  insulin lispro Injectable (ADMELOG) 15 Unit(s) SubCutaneous three times a day before meals  isosorbide   mononitrate ER Tablet (IMDUR) 30 milliGRAM(s) Oral daily  losartan 100 milliGRAM(s) Oral daily  methylPREDNISolone sodium succinate Injectable 20 milliGRAM(s) IV Push every 8 hours  metoprolol tartrate 25 milliGRAM(s) Oral two times a day  oseltamivir 75 milliGRAM(s) Oral two times a day  pantoprazole    Tablet 40 milliGRAM(s) Oral before breakfast  simvastatin 20 milliGRAM(s) Oral at bedtime    MEDICATIONS  (PRN):  dextrose Oral Gel 15 Gram(s) Oral once PRN Blood Glucose LESS THAN 70 milliGRAM(s)/deciliter  dextrose Oral Gel 15 Gram(s) Oral once PRN Blood Glucose LESS THAN 70 milliGRAM(s)/deciliter  guaiFENesin Oral Liquid (Sugar-Free) 200 milliGRAM(s) Oral every 6 hours PRN Cough  hydrocodone/homatropine Syrup 5 milliLiter(s) Oral every 6 hours PRN severe cough    Vital Signs Last 24 Hrs  T(C): 36.6 (19 Mar 2024 05:05), Max: 36.8 (18 Mar 2024 13:20)  T(F): 97.9 (19 Mar 2024 05:05), Max: 98.3 (18 Mar 2024 20:45)  HR: 76 (19 Mar 2024 05:05) (76 - 92)  BP: 120/60 (19 Mar 2024 05:05) (102/67 - 146/73)  BP(mean): --  RR: 18 (19 Mar 2024 05:05) (17 - 21)  SpO2: 100% (19 Mar 2024 05:05) (95% - 100%)    Parameters below as of 19 Mar 2024 05:05  Patient On (Oxygen Delivery Method): room air        I&O's Summary        Physical Exam:   GENERAL: NAD, well-groomed, well-developed  HEENT: ELIEZER/   Atraumatic, Normocephalic  ENMT: No tonsillar erythema, exudates, or enlargement; Moist mucous membranes, Good dentition, No lesions  NECK: Supple, No JVD, Normal thyroid  CHEST/LUNG: minimal wheezing  CVS: Regular rate and rhythm; No murmurs, rubs, or gallops  GI: : Soft, Nontender, Nondistended; Bowel sounds present  NERVOUS SYSTEM:  Alert & Oriented X3  EXTREMITIES:  2+ Peripheral Pulses, No clubbing, cyanosis, or edema  LYMPH: No lymphadenopathy noted  SKIN: No rashes or lesions  ENDOCRINOLOGY: No Thyromegaly  PSYCH: Appropriate    Labs:  23                            9.2    8.50  )-----------( 239      ( 19 Mar 2024 06:50 )             30.1                         9.0    8.52  )-----------( 223      ( 17 Mar 2024 01:31 )             30.0     03-19    139  |  102  |  31<H>  ----------------------------<  281<H>  4.2   |  22  |  0.73  03-18    140  |  102  |  17  ----------------------------<  137<H>  3.8   |  24  |  0.73  03-17    138  |  98  |  15  ----------------------------<  260<H>  4.4   |  22  |  0.71  03-17    133<L>  |  94<L>  |  17  ----------------------------<  114<H>  3.9   |  19<L>  |  0.66    Ca    9.5      19 Mar 2024 06:50  Ca    9.3      18 Mar 2024 07:53  Ca    9.6      17 Mar 2024 15:15    TPro  7.7  /  Alb  4.1  /  TBili  0.3  /  DBili  x   /  AST  39<H>  /  ALT  48<H>  /  AlkPhos  77  03-19  TPro  7.7  /  Alb  3.9  /  TBili  0.4  /  DBili  x   /  AST  44<H>  /  ALT  40<H>  /  AlkPhos  76  03-18  TPro  7.9  /  Alb  4.1  /  TBili  0.4  /  DBili  x   /  AST  30  /  ALT  38<H>  /  AlkPhos  78  03-17  TPro  7.8  /  Alb  4.0  /  TBili  0.3  /  DBili  x   /  AST  44<H>  /  ALT  43<H>  /  AlkPhos  77  03-17    CAPILLARY BLOOD GLUCOSE      POCT Blood Glucose.: 330 mg/dL (19 Mar 2024 12:09)  POCT Blood Glucose.: 410 mg/dL (19 Mar 2024 11:02)  POCT Blood Glucose.: 288 mg/dL (19 Mar 2024 07:58)  POCT Blood Glucose.: 257 mg/dL (18 Mar 2024 22:22)  POCT Blood Glucose.: 213 mg/dL (18 Mar 2024 16:44)  POCT Blood Glucose.: 183 mg/dL (18 Mar 2024 12:49)      LIVER FUNCTIONS - ( 19 Mar 2024 06:50 )  Alb: 4.1 g/dL / Pro: 7.7 g/dL / ALK PHOS: 77 U/L / ALT: 48 U/L / AST: 39 U/L / GGT: x             Urinalysis Basic - ( 19 Mar 2024 06:50 )    Color: x / Appearance: x / SG: x / pH: x  Gluc: 281 mg/dL / Ketone: x  / Bili: x / Urobili: x   Blood: x / Protein: x / Nitrite: x   Leuk Esterase: x / RBC: x / WBC x   Sq Epi: x / Non Sq Epi: x / Bacteria: x      Procalcitonin, Serum: 0.09 ng/mL (03-17 @ 15:15)  Lactate, Blood: 2.6 mmol/L (03-19 @ 06:50)  Lactate, Blood: 2.4 mmol/L (03-18 @ 07:53)  Lactate, Blood: 4.5 mmol/L (03-17 @ 15:15)  Lactate, Blood: 5.3 mmol/L (03-17 @ 06:45)        RECENT CULTURES:    rad< from: CT Angio Chest PE Protocol w/ IV Cont (03.17.24 @ 05:49) >        INTERPRETATION:  CLINICAL INFORMATION: Shortness of breath and tachypnea,   tachycardia    COMPARISON: CT abdomen 10/12/2009.    CONTRAST/COMPLICATIONS:  IV Contrast: Omnipaque 350  49 cc administered   51 cc discarded  Oral Contrast: NONE  Complications: None reported at time of study completion    PROCEDURE:  CT Angiography of the Chest.  Sagittal and coronal reformats wereperformed as well as 3D (MIP)   reconstructions.    FINDINGS:    LUNGS AND AIRWAYS: Patent central airways.  There are diffuse mild   peripheral reticular markings which may represent a fibrotic process or   mild pulmonary edema. Correlate clinically.  PLEURA: No pleural effusion.  MEDIASTINUM AND CHRISTELLE: No lymphadenopathy.  VESSELS: No pulmonary embolism.  HEART: Heart size is normal. No pericardial effusion. Coronary   calcifications. Mitral valve annulus calcification  CHEST WALL AND LOWER NECK: Within normal limits.  VISUALIZED UPPER ABDOMEN: Left renal cyst. Small hiatal hernia. Right   lobe hepatic hypodensity too small to characterize.  BONES: Generative changes.    IMPRESSION:    No pulmonary embolism.      --- End of Report ---            OVI BARTON MD; Attending Radiologist  This document has been electronically signed. Mar 17 2024  6:02AM    < end of copied text >      RESPIRATORY CULTURES:          Studies  Chest X-RAY  CT SCAN Chest   Venous Dopplers: LE:   CT Abdomen  Others               DATE OF SERVICE: 03-20-24 @ 16:16    Patient is a 78y old  Female who presents with a chief complaint of A fib with RVR (20 Mar 2024 11:55)      SUBJECTIVE / OVERNIGHT EVENTS:  No chest pain. No shortness of breath. No complaints. No events overnight. no more wheezing    MEDICATIONS  (STANDING):  apixaban 5 milliGRAM(s) Oral every 12 hours  aspirin enteric coated 81 milliGRAM(s) Oral daily  budesonide 160 MICROgram(s)/formoterol 4.5 MICROgram(s) Inhaler 2 Puff(s) Inhalation two times a day  calcium carbonate 1250 mG  + Vitamin D (OsCal 500 + D) 1 Tablet(s) Oral two times a day  dextrose 5%. 1000 milliLiter(s) (50 mL/Hr) IV Continuous <Continuous>  dextrose 5%. 1000 milliLiter(s) (100 mL/Hr) IV Continuous <Continuous>  dextrose 50% Injectable 25 Gram(s) IV Push once  dextrose 50% Injectable 12.5 Gram(s) IV Push once  dextrose 50% Injectable 25 Gram(s) IV Push once  glucagon  Injectable 1 milliGRAM(s) IntraMuscular once  insulin glargine Injectable (LANTUS) 50 Unit(s) SubCutaneous at bedtime  insulin lispro (ADMELOG) corrective regimen sliding scale   SubCutaneous three times a day before meals  insulin lispro (ADMELOG) corrective regimen sliding scale   SubCutaneous at bedtime  insulin lispro Injectable (ADMELOG) 15 Unit(s) SubCutaneous three times a day before meals  isosorbide   mononitrate ER Tablet (IMDUR) 30 milliGRAM(s) Oral daily  losartan 100 milliGRAM(s) Oral daily  metoprolol tartrate 25 milliGRAM(s) Oral two times a day  oseltamivir 75 milliGRAM(s) Oral two times a day  pantoprazole    Tablet 40 milliGRAM(s) Oral before breakfast  predniSONE   Tablet 40 milliGRAM(s) Oral daily  simvastatin 20 milliGRAM(s) Oral at bedtime    MEDICATIONS  (PRN):  dextrose Oral Gel 15 Gram(s) Oral once PRN Blood Glucose LESS THAN 70 milliGRAM(s)/deciliter  dextrose Oral Gel 15 Gram(s) Oral once PRN Blood Glucose LESS THAN 70 milliGRAM(s)/deciliter  guaiFENesin Oral Liquid (Sugar-Free) 200 milliGRAM(s) Oral every 6 hours PRN Cough  hydrocodone/homatropine Syrup 5 milliLiter(s) Oral every 6 hours PRN severe cough      Vital Signs Last 24 Hrs  T(C): 37.1 (20 Mar 2024 15:30), Max: 37.1 (20 Mar 2024 15:30)  T(F): 98.7 (20 Mar 2024 15:30), Max: 98.7 (20 Mar 2024 15:30)  HR: 84 (20 Mar 2024 15:30) (76 - 89)  BP: 133/67 (20 Mar 2024 15:30) (130/72 - 142/68)  BP(mean): --  RR: 18 (20 Mar 2024 15:30) (16 - 18)  SpO2: 98% (20 Mar 2024 15:30) (93% - 99%)    Parameters below as of 20 Mar 2024 15:30  Patient On (Oxygen Delivery Method): room air      CAPILLARY BLOOD GLUCOSE      POCT Blood Glucose.: 319 mg/dL (20 Mar 2024 12:32)  POCT Blood Glucose.: 188 mg/dL (20 Mar 2024 06:58)  POCT Blood Glucose.: 327 mg/dL (19 Mar 2024 21:42)  POCT Blood Glucose.: 301 mg/dL (19 Mar 2024 20:31)  POCT Blood Glucose.: 329 mg/dL (19 Mar 2024 17:16)    I&O's Summary      PHYSICAL EXAM:  GENERAL: NAD, well-developed  HEAD:  Atraumatic, Normocephalic  EYES: EOMI, PERRLA, conjunctiva and sclera clear  NECK: Supple, No JVD  CHEST/LUNG: Clear to auscultation bilaterally; No wheeze  HEART: Regular rate and rhythm; No murmurs, rubs, or gallops  ABDOMEN: Soft, Nontender, Nondistended; Bowel sounds present  EXTREMITIES:  2+ Peripheral Pulses, No clubbing, cyanosis, or edema  PSYCH: AAOx3  NEUROLOGY: non-focal  SKIN: No rashes or lesions    LABS:                        8.7    11.46 )-----------( 262      ( 20 Mar 2024 06:10 )             29.2     03-20    140  |  104  |  34<H>  ----------------------------<  180<H>  3.9   |  21<L>  |  0.72    Ca    9.3      20 Mar 2024 06:10  Phos  3.3     03-20  Mg     2.10     03-20    TPro  7.7  /  Alb  4.1  /  TBili  0.3  /  DBili  x   /  AST  39<H>  /  ALT  48<H>  /  AlkPhos  77  03-19          Urinalysis Basic - ( 20 Mar 2024 06:10 )    Color: x / Appearance: x / SG: x / pH: x  Gluc: 180 mg/dL / Ketone: x  / Bili: x / Urobili: x   Blood: x / Protein: x / Nitrite: x   Leuk Esterase: x / RBC: x / WBC x   Sq Epi: x / Non Sq Epi: x / Bacteria: x        RADIOLOGY & ADDITIONAL TESTS:    Imaging Personally Reviewed:    Consultant(s) Notes Reviewed:      Care Discussed with Consultants/Other Providers:   DATE OF SERVICE: 03-19-24 @ 21:36    Patient is a 78y old  Female who presents with a chief complaint of A fib with RVR (19 Mar 2024 12:36)      SUBJECTIVE / OVERNIGHT EVENTS:  No chest pain. No shortness of breath. No complaints. No events overnight.     MEDICATIONS  (STANDING):  apixaban 5 milliGRAM(s) Oral every 12 hours  aspirin enteric coated 81 milliGRAM(s) Oral daily  budesonide 160 MICROgram(s)/formoterol 4.5 MICROgram(s) Inhaler 2 Puff(s) Inhalation two times a day  calcium carbonate 1250 mG  + Vitamin D (OsCal 500 + D) 1 Tablet(s) Oral two times a day  dextrose 5%. 1000 milliLiter(s) (50 mL/Hr) IV Continuous <Continuous>  dextrose 5%. 1000 milliLiter(s) (100 mL/Hr) IV Continuous <Continuous>  dextrose 50% Injectable 25 Gram(s) IV Push once  dextrose 50% Injectable 12.5 Gram(s) IV Push once  dextrose 50% Injectable 25 Gram(s) IV Push once  glucagon  Injectable 1 milliGRAM(s) IntraMuscular once  insulin glargine Injectable (LANTUS) 50 Unit(s) SubCutaneous at bedtime  insulin lispro (ADMELOG) corrective regimen sliding scale   SubCutaneous three times a day before meals  insulin lispro (ADMELOG) corrective regimen sliding scale   SubCutaneous at bedtime  insulin lispro Injectable (ADMELOG) 15 Unit(s) SubCutaneous three times a day before meals  isosorbide   mononitrate ER Tablet (IMDUR) 30 milliGRAM(s) Oral daily  losartan 100 milliGRAM(s) Oral daily  methylPREDNISolone sodium succinate Injectable 20 milliGRAM(s) IV Push every 8 hours  metoprolol tartrate 25 milliGRAM(s) Oral two times a day  oseltamivir 75 milliGRAM(s) Oral two times a day  pantoprazole    Tablet 40 milliGRAM(s) Oral before breakfast  simvastatin 20 milliGRAM(s) Oral at bedtime    MEDICATIONS  (PRN):  dextrose Oral Gel 15 Gram(s) Oral once PRN Blood Glucose LESS THAN 70 milliGRAM(s)/deciliter  dextrose Oral Gel 15 Gram(s) Oral once PRN Blood Glucose LESS THAN 70 milliGRAM(s)/deciliter  guaiFENesin Oral Liquid (Sugar-Free) 200 milliGRAM(s) Oral every 6 hours PRN Cough  hydrocodone/homatropine Syrup 5 milliLiter(s) Oral every 6 hours PRN severe cough      Vital Signs Last 24 Hrs  T(C): 36.8 (19 Mar 2024 17:50), Max: 36.8 (18 Mar 2024 22:00)  T(F): 98.3 (19 Mar 2024 17:50), Max: 98.3 (18 Mar 2024 22:00)  HR: 76 (19 Mar 2024 19:50) (76 - 86)  BP: 136/70 (19 Mar 2024 19:50) (120/60 - 146/99)  BP(mean): --  RR: 16 (19 Mar 2024 17:50) (16 - 18)  SpO2: 98% (19 Mar 2024 17:50) (95% - 100%)    Parameters below as of 19 Mar 2024 17:50  Patient On (Oxygen Delivery Method): room air  O2 Flow (L/min): 92    CAPILLARY BLOOD GLUCOSE      POCT Blood Glucose.: 301 mg/dL (19 Mar 2024 20:31)  POCT Blood Glucose.: 329 mg/dL (19 Mar 2024 17:16)  POCT Blood Glucose.: 363 mg/dL (19 Mar 2024 16:14)  POCT Blood Glucose.: 330 mg/dL (19 Mar 2024 12:09)  POCT Blood Glucose.: 410 mg/dL (19 Mar 2024 11:02)  POCT Blood Glucose.: 288 mg/dL (19 Mar 2024 07:58)  POCT Blood Glucose.: 257 mg/dL (18 Mar 2024 22:22)    I&O's Summary      PHYSICAL EXAM:  GENERAL: NAD, well-developed  HEAD:  Atraumatic, Normocephalic  EYES: EOMI, PERRLA, conjunctiva and sclera clear  NECK: Supple, No JVD  CHEST/LUNG: Clear to auscultation bilaterally; No wheeze  HEART: Regular rate and rhythm; No murmurs, rubs, or gallops  ABDOMEN: Soft, Nontender, Nondistended; Bowel sounds present  EXTREMITIES:  2+ Peripheral Pulses, No clubbing, cyanosis, or edema  PSYCH: AAOx3  NEUROLOGY: non-focal  SKIN: No rashes or lesions    LABS:                        9.2    8.50  )-----------( 239      ( 19 Mar 2024 06:50 )             30.1     03-19    139  |  102  |  31<H>  ----------------------------<  281<H>  4.2   |  22  |  0.73    Ca    9.5      19 Mar 2024 06:50    TPro  7.7  /  Alb  4.1  /  TBili  0.3  /  DBili  x   /  AST  39<H>  /  ALT  48<H>  /  AlkPhos  77  03-19          Urinalysis Basic - ( 19 Mar 2024 06:50 )    Color: x / Appearance: x / SG: x / pH: x  Gluc: 281 mg/dL / Ketone: x  / Bili: x / Urobili: x   Blood: x / Protein: x / Nitrite: x   Leuk Esterase: x / RBC: x / WBC x   Sq Epi: x / Non Sq Epi: x / Bacteria: x        RADIOLOGY & ADDITIONAL TESTS:    Imaging Personally Reviewed:    Consultant(s) Notes Reviewed:      Care Discussed with Consultants/Other Providers:

## 2024-04-02 ENCOUNTER — TELEPHONE (OUTPATIENT)
Dept: FAMILY MEDICINE | Facility: CLINIC | Age: 59
End: 2024-04-02

## 2024-04-02 ENCOUNTER — ANTICOAGULATION THERAPY VISIT (OUTPATIENT)
Dept: ANTICOAGULATION | Facility: CLINIC | Age: 59
End: 2024-04-02

## 2024-04-02 ENCOUNTER — LAB (OUTPATIENT)
Dept: LAB | Facility: CLINIC | Age: 59
End: 2024-04-02
Payer: COMMERCIAL

## 2024-04-02 DIAGNOSIS — Z86.711 HISTORY OF PULMONARY EMBOLISM: ICD-10-CM

## 2024-04-02 DIAGNOSIS — Z79.01 LONG TERM CURRENT USE OF ANTICOAGULANTS WITH INR GOAL OF 2.0-3.0: ICD-10-CM

## 2024-04-02 DIAGNOSIS — Z86.718 PERSONAL HISTORY OF DVT (DEEP VEIN THROMBOSIS): ICD-10-CM

## 2024-04-02 DIAGNOSIS — Z86.711 HISTORY OF PULMONARY EMBOLISM: Primary | ICD-10-CM

## 2024-04-02 LAB — INR BLD: 2 (ref 0.9–1.1)

## 2024-04-02 PROCEDURE — 85610 PROTHROMBIN TIME: CPT

## 2024-04-02 PROCEDURE — 36416 COLLJ CAPILLARY BLOOD SPEC: CPT

## 2024-04-02 RX ORDER — WARFARIN SODIUM 5 MG/1
TABLET ORAL
Qty: 124 TABLET | Refills: 1 | Status: SHIPPED | OUTPATIENT
Start: 2024-04-02 | End: 2024-07-09

## 2024-04-02 NOTE — PROGRESS NOTES
ANTICOAGULATION MANAGEMENT:  Medication Refill    Anticoagulation Summary  As of 4/2/2024      Warfarin maintenance plan:  5 mg (5 mg x 1) every Tue, Sat; 7.5 mg (5 mg x 1.5) all other days   Next INR check:  5/28/2024   Target end date:  Indefinite    Indications    Personal history of DVT (deep vein thrombosis) (Resolved) [Z86.718]  History of pulmonary embolism [Z86.711]  Long term current use of anticoagulants with INR goal of 2.0-3.0 [Z79.01]  Personal history of DVT (deep vein thrombosis) [Z86.718]                 Anticoagulation Care Providers       Provider Role Specialty Phone number    Rasheeda Juan MD Referring Family Medicine [phone]    Jess Kauffman APRN CNP Referring Family Medicine [phone]            Refill Criteria    Visit with referring provider/group: Meets criteria: office visit within referring provider group in the last 1 year on 2/5/24    [facility] referral last signed: 02/13/2024; within last year: Yes    Lab monitoring not exceeding 2 weeks overdue: Yes    Schuyler meets all criteria for refill. Rx instructions and quantity match patient's current dosing plan. Warfarin 90 day supply with 1 refill granted per [facility] protocol     Violeta Sanchez RN  Anticoagulation Clinic

## 2024-04-02 NOTE — PROGRESS NOTES
ANTICOAGULATION MANAGEMENT     Schuyler Castro 58 year old male is on warfarin with therapeutic INR result. (Goal INR 2.0-3.0)    Recent labs: (last 7 days)     04/02/24  0756   INR 2.0*       ASSESSMENT     Source(s): Chart Review and Patient/Caregiver Call     Warfarin doses taken: Warfarin taken as instructed  Diet: No new diet changes identified  Medication/supplement changes: None noted  New illness, injury, or hospitalization: No  Signs or symptoms of bleeding or clotting: No  Previous result: Therapeutic last 2(+) visits  Additional findings: None       PLAN     Recommended plan for no diet, medication or health factor changes affecting INR     Dosing Instructions: Continue your current warfarin dose with next INR in 8 weeks       Summary  As of 4/2/2024      Full warfarin instructions:  5 mg every Tue, Sat; 7.5 mg all other days   Next INR check:  5/28/2024               Telephone call with David who agrees to plan and repeated back plan correctly    Lab visit scheduled    Education provided:   Please call back if any changes to your diet, medications or how you've been taking warfarin    Plan made per [facility] anticoagulation protocol    Vioelta Sanchez RN  Anticoagulation Clinic  4/2/2024    _______________________________________________________________________     Anticoagulation Episode Summary       Current INR goal:  2.0-3.0   TTR:  82.3% (1 y)   Target end date:  Indefinite   Send INR reminders to:  CASEY GARCIA    Indications    Personal history of DVT (deep vein thrombosis) (Resolved) [Z86.718]  History of pulmonary embolism [Z86.711]  Long term current use of anticoagulants with INR goal of 2.0-3.0 [Z79.01]  Personal history of DVT (deep vein thrombosis) [Z86.718]             Comments:  6/7/21-OK for 12 week INR monitoring             Anticoagulation Care Providers       Provider Role Specialty Phone number    Rasheeda Juan MD Referring Family Medicine [phone]    Jess Kauffman APRN CNP  [facility] [phone]

## 2024-04-11 NOTE — TELEPHONE ENCOUNTER
Patient transferring his care to , he will establish with Dr. Juan on 04/24/19. Patient states Dr. Fraga at [facility] hematology suggested he remain on Warfarin for life; however, patient states genetic clotting disorders were ruled out.    Has the patient previously taken warfarin? yes  If yes, for what indication? Pulmonary Embolism    Does the patient have any of the following indications for a higher range of 2.5-3.5:    Mitral position mechanical valve? no    Negrito-Shiley, Ball and Cage or Monoleaflet valve (regardless of position) no    Other (if yes, please explain) no      New insurance and medicare reimbursement rules require that all of our INR patients have an INR Clinic referral order in Epic, and that it be renewed annually.     We have entered this initial order for you and your signature is required once you review it.       You may close this encounter once signed.     Thank you,     Lyly Florian RN        yes

## 2024-04-13 ENCOUNTER — HEALTH MAINTENANCE LETTER (OUTPATIENT)
Age: 59
End: 2024-04-13

## 2024-05-03 DIAGNOSIS — E11.65 TYPE 2 DIABETES MELLITUS WITH HYPERGLYCEMIA, WITHOUT LONG-TERM CURRENT USE OF INSULIN (H): ICD-10-CM

## 2024-05-28 ENCOUNTER — ANTICOAGULATION THERAPY VISIT (OUTPATIENT)
Dept: ANTICOAGULATION | Facility: CLINIC | Age: 59
End: 2024-05-28

## 2024-05-28 ENCOUNTER — LAB (OUTPATIENT)
Dept: LAB | Facility: CLINIC | Age: 59
End: 2024-05-28
Payer: COMMERCIAL

## 2024-05-28 DIAGNOSIS — Z86.718 PERSONAL HISTORY OF DVT (DEEP VEIN THROMBOSIS): ICD-10-CM

## 2024-05-28 DIAGNOSIS — Z86.711 HISTORY OF PULMONARY EMBOLISM: ICD-10-CM

## 2024-05-28 DIAGNOSIS — E11.65 TYPE 2 DIABETES MELLITUS WITH HYPERGLYCEMIA (H): Primary | ICD-10-CM

## 2024-05-28 DIAGNOSIS — Z86.711 HISTORY OF PULMONARY EMBOLISM: Primary | ICD-10-CM

## 2024-05-28 DIAGNOSIS — Z79.01 LONG TERM CURRENT USE OF ANTICOAGULANTS WITH INR GOAL OF 2.0-3.0: ICD-10-CM

## 2024-05-28 LAB — INR BLD: 2.5 (ref 0.9–1.1)

## 2024-05-28 PROCEDURE — 85610 PROTHROMBIN TIME: CPT

## 2024-05-28 PROCEDURE — 36415 COLL VENOUS BLD VENIPUNCTURE: CPT

## 2024-05-28 NOTE — PROGRESS NOTES
ANTICOAGULATION MANAGEMENT     Schuyler Castro 58 year old male is on warfarin with therapeutic INR result. (Goal INR 2.0-3.0)    Recent labs: (last 7 days)     05/28/24  0759   INR 2.5*       ASSESSMENT     Source(s): Chart Review and Patient/Caregiver Call     Warfarin doses taken: Warfarin taken as instructed  Diet: No new diet changes identified  Medication/supplement changes: None noted  New illness, injury, or hospitalization: No  Signs or symptoms of bleeding or clotting: No  Previous result: Therapeutic last 2(+) visits  Additional findings: None       PLAN     Recommended plan for no diet, medication or health factor changes affecting INR     Dosing Instructions: Continue your current warfarin dose with next INR in 8 weeks       Summary  As of 5/28/2024      Full warfarin instructions:  5 mg every Tue, Sat; 7.5 mg all other days   Next INR check:  7/23/2024               Telephone call with David who agrees to plan and repeated back plan correctly    Lab visit scheduled    Education provided:   Please call back if any changes to your diet, medications or how you've been taking warfarin    Plan made per [facility] anticoagulation protocol    Violeta Sanchez RN  Anticoagulation Clinic  5/28/2024    _______________________________________________________________________     Anticoagulation Episode Summary       Current INR goal:  2.0-3.0   TTR:  88.1% (1 y)   Target end date:  Indefinite   Send INR reminders to:  CASEY GARCIA    Indications    Personal history of DVT (deep vein thrombosis) (Resolved) [Z86.718]  History of pulmonary embolism [Z86.711]  Long term current use of anticoagulants with INR goal of 2.0-3.0 [Z79.01]  Personal history of DVT (deep vein thrombosis) [Z86.718]             Comments:  6/7/21-OK for 12 week INR monitoring             Anticoagulation Care Providers       Provider Role Specialty Phone number    Rasheeda Juan MD Referring Family Medicine [phone]    Jess Kauffman, CHIQUI  CNP Referring Family Medicine [phone]

## 2024-06-22 ENCOUNTER — HEALTH MAINTENANCE LETTER (OUTPATIENT)
Age: 59
End: 2024-06-22

## 2024-07-09 DIAGNOSIS — Z86.718 PERSONAL HISTORY OF DVT (DEEP VEIN THROMBOSIS): ICD-10-CM

## 2024-07-09 DIAGNOSIS — Z86.711 HISTORY OF PULMONARY EMBOLISM: ICD-10-CM

## 2024-07-09 RX ORDER — WARFARIN SODIUM 5 MG/1
TABLET ORAL
Qty: 124 TABLET | Refills: 1 | Status: SHIPPED | OUTPATIENT
Start: 2024-07-09

## 2024-07-09 NOTE — TELEPHONE ENCOUNTER
ANTICOAGULATION MANAGEMENT:  Medication Refill    Anticoagulation Summary  As of 5/28/2024      Warfarin maintenance plan:  5 mg (5 mg x 1) every Tue, Sat; 7.5 mg (5 mg x 1.5) all other days   Next INR check:  7/23/2024   Target end date:  Indefinite    Indications    Personal history of DVT (deep vein thrombosis) (Resolved) [Z86.718]  History of pulmonary embolism [Z86.711]  Long term current use of anticoagulants with INR goal of 2.0-3.0 [Z79.01]  Personal history of DVT (deep vein thrombosis) [Z86.718]                 Anticoagulation Care Providers       Provider Role Specialty Phone number    Rasheeda Juan MD Referring Family Medicine [phone]    Jess Kauffman APRN CNP Referring Family Medicine [phone]            Refill Criteria    Visit with referring provider/group: Meets criteria: office visit within referring provider group in the last 1 year on 2/5/24    [facility] referral last signed: 02/13/2024; within last year: Yes    Lab monitoring not exceeding 2 weeks overdue: Yes    Schuyler meets all criteria for refill. Rx instructions and quantity match patient's current dosing plan. Warfarin 90 day supply with 1 refill granted per [facility] protocol     Cailin Dockery RN  Anticoagulation Clinic

## 2024-07-21 ENCOUNTER — PATIENT OUTREACH (OUTPATIENT)
Dept: CARE COORDINATION | Facility: CLINIC | Age: 59
End: 2024-07-21
Payer: COMMERCIAL

## 2024-07-22 ENCOUNTER — LAB (OUTPATIENT)
Dept: LAB | Facility: CLINIC | Age: 59
End: 2024-07-22
Payer: COMMERCIAL

## 2024-07-22 ENCOUNTER — ANTICOAGULATION THERAPY VISIT (OUTPATIENT)
Dept: ANTICOAGULATION | Facility: CLINIC | Age: 59
End: 2024-07-22

## 2024-07-22 ENCOUNTER — OFFICE VISIT (OUTPATIENT)
Dept: PHARMACY | Facility: CLINIC | Age: 59
End: 2024-07-22

## 2024-07-22 VITALS
HEART RATE: 77 BPM | BODY MASS INDEX: 24.52 KG/M2 | DIASTOLIC BLOOD PRESSURE: 90 MMHG | OXYGEN SATURATION: 99 % | WEIGHT: 175.8 LBS | SYSTOLIC BLOOD PRESSURE: 154 MMHG

## 2024-07-22 DIAGNOSIS — Z79.01 LONG TERM CURRENT USE OF ANTICOAGULANTS WITH INR GOAL OF 2.0-3.0: ICD-10-CM

## 2024-07-22 DIAGNOSIS — Z86.718 PERSONAL HISTORY OF DVT (DEEP VEIN THROMBOSIS): ICD-10-CM

## 2024-07-22 DIAGNOSIS — E11.65 TYPE 2 DIABETES MELLITUS WITH HYPERGLYCEMIA, WITHOUT LONG-TERM CURRENT USE OF INSULIN (H): Primary | ICD-10-CM

## 2024-07-22 DIAGNOSIS — E11.65 TYPE 2 DIABETES MELLITUS WITH HYPERGLYCEMIA (H): ICD-10-CM

## 2024-07-22 DIAGNOSIS — Z86.711 HISTORY OF PULMONARY EMBOLISM: ICD-10-CM

## 2024-07-22 DIAGNOSIS — Z86.711 HISTORY OF PULMONARY EMBOLISM: Primary | ICD-10-CM

## 2024-07-22 DIAGNOSIS — E78.5 HYPERLIPIDEMIA LDL GOAL <100: ICD-10-CM

## 2024-07-22 DIAGNOSIS — I10 HYPERTENSION GOAL BP (BLOOD PRESSURE) < 140/90: ICD-10-CM

## 2024-07-22 LAB
CHOLEST SERPL-MCNC: 103 MG/DL
CREAT UR-MCNC: 24.4 MG/DL
FASTING STATUS PATIENT QL REPORTED: YES
HBA1C MFR BLD: 6.1 % (ref 0–5.6)
HDLC SERPL-MCNC: 56 MG/DL
INR BLD: 3 (ref 0.9–1.1)
LDLC SERPL CALC-MCNC: 35 MG/DL
MICROALBUMIN UR-MCNC: <12 MG/L
MICROALBUMIN/CREAT UR: NORMAL MG/G{CREAT}
NONHDLC SERPL-MCNC: 47 MG/DL
TRIGL SERPL-MCNC: 60 MG/DL

## 2024-07-22 PROCEDURE — 82570 ASSAY OF URINE CREATININE: CPT

## 2024-07-22 PROCEDURE — 82043 UR ALBUMIN QUANTITATIVE: CPT

## 2024-07-22 PROCEDURE — 80061 LIPID PANEL: CPT

## 2024-07-22 PROCEDURE — 85610 PROTHROMBIN TIME: CPT

## 2024-07-22 PROCEDURE — 83036 HEMOGLOBIN GLYCOSYLATED A1C: CPT

## 2024-07-22 PROCEDURE — 99207 PR NO CHARGE LOS: CPT | Performed by: PHARMACIST

## 2024-07-22 PROCEDURE — 36415 COLL VENOUS BLD VENIPUNCTURE: CPT

## 2024-07-22 RX ORDER — ORAL SEMAGLUTIDE 14 MG/1
14 TABLET ORAL DAILY
Qty: 90 TABLET | Refills: 1 | Status: SHIPPED | OUTPATIENT
Start: 2024-07-22

## 2024-07-22 RX ORDER — LISINOPRIL 20 MG/1
20 TABLET ORAL DAILY
Qty: 90 TABLET | Refills: 1 | Status: SHIPPED | OUTPATIENT
Start: 2024-07-22

## 2024-07-22 RX ORDER — ROSUVASTATIN CALCIUM 20 MG/1
20 TABLET, COATED ORAL DAILY
Qty: 90 TABLET | Refills: 1 | Status: SHIPPED | OUTPATIENT
Start: 2024-07-22

## 2024-07-22 NOTE — PATIENT INSTRUCTIONS
"Recommendations from today's MTM visit:                                                      Schedule diabetes eye exam if you have not done so already.  Send me some home blood pressure readings beginning of Sept. If you start to have readings in the 140's on top or 90's on bottom then see me sooner.  Cut out the energy drinks and start watching your sodium intake.  Schedule annual with Jess Kauffman in Feb 2025.      Follow-up: Return in 12 weeks (on 10/14/2024).    It was great speaking with you today.  I value your experience and would be very thankful for your time in providing feedback in our clinic survey. In the next few days, you may receive an email or text message from [employer] with a link to a survey related to your  clinical pharmacist.\"     To schedule another MTM appointment, please call the clinic directly or you may call the MTM scheduling line at [phone] or toll-free at [phone].     My Clinical Pharmacist's contact information:                                                      Please feel free to contact me with any questions or concerns you have.      Yenifer De La Fuente, PharmD  Medication Therapy Management Pharmacist  [facility] - Monday and Wednesday 7:30 - 4:00    "

## 2024-07-22 NOTE — PROGRESS NOTES
Medication Therapy Management (MTM) Encounter    ASSESSMENT:                            Medication Adherence/Access: No issues identified    Diabetes:   Patient is meeting A1c goal of < 7%. Self monitoring of blood glucose is at goal of fasting  mg/dL Patient would benefit from no change today. Encouraged continued lifestyle to maintain glycemic control. Aspirin therapy is not indicated in this patient due to anticoagulant/antiplatelet therapy. Recommended diabetes eye exam today.    Hypertension:   Patient is not meeting blood pressure goal of < 140/90mmHg in clinic but home readings are at goal. I did verify his home wrist cuff is accurate today. Recommended he cut out the energy drinks and watch sodium intake. He would prefer to continue to check home blood pressure readings and send some in via [employer] in about a month.     Hyperlipidemia:   Stable    Refilled medications per patient request today.  PLAN:                            Schedule diabetes eye exam if you have not done so already.  Send me some home blood pressure readings beginning of Sept. If you start to have readings in the 140's on top or 90's on bottom then see me sooner.  Cut out the energy drinks and start watching your sodium intake.  Schedule annual with Jess Kauffman in Feb 2025.    Follow-up: Return in 12 weeks (on 10/14/2024).    SUBJECTIVE/OBJECTIVE:                          David Castro is a 58 year old male seen for a follow-up visit from 11/29/23.       Reason for visit: follow-up diabetes and blood pressure.    Allergies/ADRs: Reviewed in chart  Past Medical History: Reviewed in chart  Tobacco: He reports that he has never smoked. He has never been exposed to tobacco smoke. He has never used smokeless tobacco.  Alcohol: not currently using    Medication Adherence/Access: no issues reported    Diabetes   Rybelsus 14 mg once daily - has been better at taking on empty stomach (on this dose since 10/2021)  metformin 1000 mg twice  daily  Jardiance 10 mg once daily     Medication History: glipizide XL 5 mg daily stopped due to low blood sugars.    Patient is not experiencing side effects. He is not interested in using a cgm at this time. He really doesn't want to use injectable medicine.     Blood sugar monitorin-2 time(s) daily; Ranges: AM fasting            From last MTM visit:           Current diabetes symptoms: none, no hypoglycemia    Diet/Exercise: He is going to start lifting. He does a 4 mile walk ever night and this takes him about an hour and a half or so. He is on a low carb diet. He 40/50/60 grams of carbs per meal or lower and having 1-2 snacks per day (15-20 grams of carbs per snack). He tends to eat turkey wraps at lunch with some nuts.    He has lost almost 100 lbs.    Wt Readings from Last 5 Encounters:   24 175 lb 12.8 oz (79.7 kg)   24 181 lb (82.1 kg)   23 192 lb 9.6 oz (87.4 kg)   10/30/23 194 lb 8 oz (88.2 kg)   23 208 lb (94.3 kg)          Eye exam in the last 12 months? No  Foot exam is up to date    Hypertension   Lisinopril 20 mg one tablet daily   Atenolol 50 mg once daily    Patient reports no current medication side effects. Reports no chest pain or shortness  He is not watching sodium intake currently. Patient does self-monitor blood pressure.  He has a home wrist cuff with the following readings: 110-130's/60-70's -  last 5 readings 122/79, 90; 117/60, 79 (24); 117/59, 78; 130/72, 81; 125/78, 77    Home Omron wrist cuff today: 154/93    He does have monster/rockstar drinks and will have 1 per day. He did not have one today.          Hyperlipidemia   Rosuvastatin 20 mg once daily  Patient reports no significant myalgias or other side effects.         Today's Vitals: BP (!) 154/90   Pulse 77   Wt 175 lb 12.8 oz (79.7 kg)   SpO2 99%   BMI 24.52 kg/m    ----------------      I spent 20 minutes with this patient today. All changes were made via collaborative practice agreement  with CHIQUI Mike CNP. A copy of the visit note was provided to the patient's provider(s).    A summary of these recommendations was sent via [employer].    Yenifer De La Fuente, MarioD  Medication Therapy Management Pharmacist     Medication Therapy Recommendations  No medication therapy recommendations to display

## 2024-07-22 NOTE — PROGRESS NOTES
ANTICOAGULATION MANAGEMENT     Schuyler Castro 58 year old male is on warfarin with therapeutic INR result. (Goal INR 2.0-3.0)    Recent labs: (last 7 days)     07/22/24  0857   INR 3.0*       ASSESSMENT     Source(s): Chart Review and Patient/Caregiver Call     Warfarin doses taken: Warfarin taken as instructed  Diet: No new diet changes identified  Medication/supplement changes: Notes a couple doses of tylenol within the last week due to a sore knee. Does not plan to continue and will stretch prior to activity.  New illness, injury, or hospitalization: No  Signs or symptoms of bleeding or clotting: No  Previous result: Therapeutic last 2(+) visits  Additional findings: None       PLAN     Recommended plan for no diet, medication or health factor changes affecting INR     Dosing Instructions: Continue your current warfarin dose with next INR in 9-10 weeks       Summary  As of 7/22/2024      Full warfarin instructions:  5 mg every Tue, Sat; 7.5 mg all other days   Next INR check:  9/26/2024               Telephone call with David who verbalizes understanding and agrees to plan    Lab visit scheduled    Education provided: Please call back if any changes to your diet, medications or how you've been taking warfarin    Plan made per [facility] anticoagulation protocol    Jjuu Dockery RN  Anticoagulation Clinic  7/22/2024    _______________________________________________________________________     Anticoagulation Episode Summary       Current INR goal:  2.0-3.0   TTR:  100.0% (1 y)   Target end date:  Indefinite   Send INR reminders to:  CASEY GARCIA    Indications    Personal history of DVT (deep vein thrombosis) (Resolved) [Z86.718]  History of pulmonary embolism [Z86.711]  Long term current use of anticoagulants with INR goal of 2.0-3.0 [Z79.01]  Personal history of DVT (deep vein thrombosis) [Z86.718]             Comments:  6/7/21-OK for 12 week INR monitoring             Anticoagulation Care Providers        Provider Role Specialty Phone number    Rasheeda Juan MD Referring Family Medicine [phone]    Jess Kauffman APRN CNP Referring Family Medicine [phone]

## 2024-08-06 ENCOUNTER — PATIENT OUTREACH (OUTPATIENT)
Dept: CARE COORDINATION | Facility: CLINIC | Age: 59
End: 2024-08-06
Payer: COMMERCIAL

## 2024-09-26 ENCOUNTER — LAB (OUTPATIENT)
Dept: LAB | Facility: CLINIC | Age: 59
End: 2024-09-26
Payer: COMMERCIAL

## 2024-09-26 ENCOUNTER — ANTICOAGULATION THERAPY VISIT (OUTPATIENT)
Dept: ANTICOAGULATION | Facility: CLINIC | Age: 59
End: 2024-09-26

## 2024-09-26 DIAGNOSIS — Z86.711 HISTORY OF PULMONARY EMBOLISM: Primary | ICD-10-CM

## 2024-09-26 DIAGNOSIS — Z86.718 PERSONAL HISTORY OF DVT (DEEP VEIN THROMBOSIS): ICD-10-CM

## 2024-09-26 DIAGNOSIS — Z79.01 LONG TERM CURRENT USE OF ANTICOAGULANTS WITH INR GOAL OF 2.0-3.0: ICD-10-CM

## 2024-09-26 DIAGNOSIS — Z86.711 HISTORY OF PULMONARY EMBOLISM: ICD-10-CM

## 2024-09-26 LAB — INR BLD: 2.4 (ref 0.9–1.1)

## 2024-09-26 PROCEDURE — 85610 PROTHROMBIN TIME: CPT

## 2024-09-26 PROCEDURE — 36416 COLLJ CAPILLARY BLOOD SPEC: CPT

## 2024-09-26 NOTE — PROGRESS NOTES
ANTICOAGULATION MANAGEMENT     Schuyler Castro 59 year old male is on warfarin with therapeutic INR result. (Goal INR 2.0-3.0)    Recent labs: (last 7 days)     09/26/24  0727   INR 2.4*       ASSESSMENT     Source(s): Chart Review and Patient/Caregiver Call     Warfarin doses taken: Warfarin taken as instructed  Diet: No new diet changes identified  Medication/supplement changes: None noted  New illness, injury, or hospitalization: No  Signs or symptoms of bleeding or clotting: No  Previous result: Therapeutic last 9 INR visits  Additional findings:  Continues to do well with no new complaints or concerns.       PLAN     Recommended plan for no diet, medication or health factor changes affecting INR     Dosing Instructions: Continue your current warfarin dose with next INR in 10 weeks       Summary  As of 9/26/2024      Full warfarin instructions:  5 mg every Tue, Sat; 7.5 mg all other days   Next INR check:  12/5/2024               Telephone call with David who verbalizes understanding and agrees to plan    Lab visit scheduled - INR on 12/9/24 @ [facility].    Education provided: Please call back if any changes to your diet, medications or how you've been taking warfarin  Taking warfarin: Importance of taking warfarin as instructed  Goal range and lab monitoring: goal range and significance of current result  Symptom monitoring: monitoring for bleeding signs and symptoms  Importance of notifying anticoagulation clinic for: changes in medications; a sooner lab recheck maybe needed    Plan made per [facility] anticoagulation protocol    Landy Galdamez RN  9/26/2024  Anticoagulation Clinic  [employer] for routing messages: karime GARCIA  [facility] patient phone line: [phone]        _______________________________________________________________________     Anticoagulation Episode Summary       Current INR goal:  2.0-3.0   TTR:  100.0% (1 y)   Target end date:  Indefinite   Send INR reminders to:  CASEY  ROSEM[place]    Indications    Personal history of DVT (deep vein thrombosis) (Resolved) [Z86.718]  History of pulmonary embolism [Z86.711]  Long term current use of anticoagulants with INR goal of 2.0-3.0 [Z79.01]  Personal history of DVT (deep vein thrombosis) [Z86.718]             Comments:  6/7/21-OK for 12 week INR monitoring             Anticoagulation Care Providers       Provider Role Specialty Phone number    Rasheeda Juan MD Referring Family Medicine [phone]    Jess Kauffman APRN CNP Referring Family Medicine [phone]

## 2024-11-11 ENCOUNTER — MYC MEDICAL ADVICE (OUTPATIENT)
Dept: FAMILY MEDICINE | Facility: CLINIC | Age: 59
End: 2024-11-11
Payer: COMMERCIAL

## 2024-11-11 ENCOUNTER — NURSE TRIAGE (OUTPATIENT)
Dept: FAMILY MEDICINE | Facility: CLINIC | Age: 59
End: 2024-11-11
Payer: COMMERCIAL

## 2024-11-11 NOTE — TELEPHONE ENCOUNTER
Patient sent Joobili message reporting pain in his hip flexors. He states the pain is intermittent and at the worst rates it a 4/10. Currently he has no pain or swelling. He recently returned from a trip to [place] where they were doing a lot of walking. He denies pain radiating to his legs or back, numbness, or rash. Per symptoms, disposition is to be evaluated within 2 weeks. Patient agrees and scheduled appointment. Reviewed if symptoms worsen, patient needs to call back. Patient verbalized understanding.     Appointments in Next Year      Nov 25, 2024 9:00 AM  (Arrive by 8:40 AM)  Provider Visit with CHIQUI Jurado CNP  [facility] ([facility] ) [phone]     Wendy Ray RN 11/11/2024 1:32 PM  [facility]    Reason for Disposition   MILD pain (e.g., does not interfere with normal activities) and present > 7 days    Additional Information   Negative: Looks like a broken bone or dislocated joint (e.g., crooked or deformed)   Negative: Sounds like a life-threatening emergency to the triager   Negative: Followed a hip injury   Negative: Leg pain is main symptom   Negative: Back pain radiating (shooting) into hip   Negative: SEVERE pain (e.g., excruciating, unable to do any normal activities) and fever   Negative: Can't stand (bear weight) or walk   Negative: Fever and red area (or area very tender to touch)   Negative: Patient sounds very sick or weak to the triager   Negative: SEVERE pain (e.g., excruciating, unable to do any normal activities)   Negative: Red area or streak > 2 inches (or 5 cm)   Negative: Painful rash with multiple small blisters grouped together (i.e., dermatomal distribution or 'band' or 'stripe')   Negative: Looks like a boil, infected sore, deep ulcer, or other infected rash (spreading redness, pus)   Negative: Localized rash is very painful (no fever)   Negative: Numbness in a leg or foot (i.e., loss of  "sensation)   Negative: Patient wants to be seen   Negative: MODERATE pain (e.g., interferes with normal activities, limping) and present > 3 days    Answer Assessment - Initial Assessment Questions  1. LOCATION and RADIATION: \"Where is the pain located?\"       Hip pain mostly on hip flexor muscle. Left side started and now on right side   2. QUALITY: \"What does the pain feel like?\"  (e.g., sharp, dull, aching, burning)      Sometimes sharp  3. SEVERITY: \"How bad is the pain?\" \"What does it keep you from doing?\"   (Scale 1-10; or mild, moderate, severe)    -  MILD (1-3): doesn't interfere with normal activities     -  MODERATE (4-7): interferes with normal activities (e.g., work or school) or awakens from sleep, limping     -  SEVERE (8-10): excruciating pain, unable to do any normal activities, unable to walk      4/10 at worst  4. ONSET: \"When did the pain start?\" \"Does it come and go, or is it there all the time?\"      A few weeks ago, intermittent  5. WORK OR EXERCISE: \"Has there been any recent work or exercise that involved this part of the body?\"       Lots of walking  6. CAUSE: \"What do you think is causing the hip pain?\"       Increase in walking while in [place]   7. AGGRAVATING FACTORS: \"What makes the hip pain worse?\" (e.g., walking, climbing stairs, running)      Walking  8. OTHER SYMPTOMS: \"Do you have any other symptoms?\" (e.g., back pain, pain shooting down leg,  fever, rash)      Reports swelling. Denies back pain, pain shooting down legs, fever, or rash    Protocols used: Hip Pain-A-[place]    "

## 2024-11-25 ENCOUNTER — OFFICE VISIT (OUTPATIENT)
Dept: FAMILY MEDICINE | Facility: CLINIC | Age: 59
End: 2024-11-25
Payer: COMMERCIAL

## 2024-11-25 VITALS
DIASTOLIC BLOOD PRESSURE: 80 MMHG | SYSTOLIC BLOOD PRESSURE: 126 MMHG | RESPIRATION RATE: 18 BRPM | HEART RATE: 94 BPM | TEMPERATURE: 97.7 F | WEIGHT: 180.1 LBS | OXYGEN SATURATION: 99 % | BODY MASS INDEX: 25.21 KG/M2 | HEIGHT: 71 IN

## 2024-11-25 DIAGNOSIS — R10.32 INGUINAL PAIN OF BOTH SIDES: Primary | ICD-10-CM

## 2024-11-25 DIAGNOSIS — R10.31 INGUINAL PAIN OF BOTH SIDES: Primary | ICD-10-CM

## 2024-11-25 DIAGNOSIS — R19.09 INGUINAL SWELLING: ICD-10-CM

## 2024-11-25 PROCEDURE — G2211 COMPLEX E/M VISIT ADD ON: HCPCS

## 2024-11-25 PROCEDURE — 99213 OFFICE O/P EST LOW 20 MIN: CPT

## 2024-11-25 NOTE — PROGRESS NOTES
"  Assessment & Plan     (R10.31,  R10.32) Inguinal pain of both sides  (primary encounter diagnosis)  (R19.09) Inguinal swelling  Comment: suspect bilateral inguinal hernias. Was able to reduce during visit today. Shared decision to send to General Surgery for consulting. Patient to follow up if US is needed. Patient fully understands and is agreeable with plan of care, at this point patient will follow up as needed unless acute concerns arise in the meantime.  Plan: Adult Gen Surg  Referral    The longitudinal plan of care for the diagnosis(es)/condition(s) as documented were addressed during this visit. Due to the added complexity in care, I will continue to support David in the subsequent management and with ongoing continuity of care.    BMI  Estimated body mass index is 25.12 kg/m  as calculated from the following:    Height as of this encounter: 1.803 m (5' 11\").    Weight as of this encounter: 81.7 kg (180 lb 1.6 oz).     Subjective   David is a 59 year old, presenting for the following health issues:  Musculoskeletal Problem        11/25/2024     8:35 AM   Additional Questions   Roomed by Chhaya JONES     Via the Health Maintenance questionnaire, the patient has reported the following services have been completed -Colonscopy: [facility] 2023-06-20, this information has not been sent to the abstraction team.  History of Present Illness       Reason for visit:  Possible hernia  Symptom onset:  3-4 weeks ago   He is taking medications regularly.     Pain History:  When did you first notice your pain? 3-4 weeks ago    Have you seen anyone else for your pain? No  How has your pain affected your ability to work? Pain does not limit ability to work   What type of work do you or did you do?   Where in your body do you have pain? Musculoskeletal problem/pain  Onset/Duration: 3-4 weeks ago   Description  Location: hips - front area both sides   Joint Swelling: YES  Redness: No  Pain: YES  Warmth: " "No  Intensity:  1/10  Progression of Symptoms:  improving  Accompanying signs and symptoms:   Fevers: No  Numbness/tingling/weakness: No  History  Trauma to the area: No  Recent illness:  No  Previous similar problem: Yes, first of the summer   Previous evaluation:  No  Precipitating or alleviating factors:  Aggravating factors include: walking and overuse  Therapies tried and outcome: rest/inactivity    Feels like a burn sensation in the left groin   Walks routinely 4-6 miles/day   Went to [facility] and noted swelling on both the right and left groin area, more so on the right.   Pain quite mild when there is discomfort noted  Typically swelling improves by morning after sleep  Denies severe abdominal pain, fever, chills, myalgias, lightheadedness/dizziness.     Review of Systems  Constitutional, cardiovascular, musculoskeletal systems are negative, except as otherwise noted.      Objective    BP (!) 150/80 (BP Location: Left arm, Patient Position: Sitting, Cuff Size: Adult Large)   Pulse 94   Temp 97.7  F (36.5  C) (Oral)   Resp 18   Ht 1.803 m (5' 11\")   Wt 81.7 kg (180 lb 1.6 oz)   SpO2 99%   BMI 25.12 kg/m    Body mass index is 25.12 kg/m .  Physical Exam  Vitals and nursing note reviewed.   Constitutional:       Appearance: Normal appearance. He is well-developed. He is not ill-appearing or toxic-appearing.   Cardiovascular:      Rate and Rhythm: Normal rate.   Pulmonary:      Effort: Pulmonary effort is normal.   Abdominal:      Hernia: A hernia is present. Hernia is present in the left inguinal area and right inguinal area.   Genitourinary:         Comments: Bilateral lumps noted at highlighted areas, able to reduce both lump.   Neurological:      Mental Status: He is alert.   Psychiatric:         Attention and Perception: Attention and perception normal.         Mood and Affect: Mood normal.         Speech: Speech normal.         Behavior: Behavior normal. Behavior is cooperative.         Thought " Content: Thought content normal.         Judgment: Judgment normal.            Signed Electronically by: CHIQUI Jurado CNP

## 2024-12-10 ENCOUNTER — OFFICE VISIT (OUTPATIENT)
Dept: SURGERY | Facility: CLINIC | Age: 59
End: 2024-12-10
Payer: COMMERCIAL

## 2024-12-10 VITALS
WEIGHT: 180 LBS | HEART RATE: 77 BPM | DIASTOLIC BLOOD PRESSURE: 82 MMHG | SYSTOLIC BLOOD PRESSURE: 146 MMHG | OXYGEN SATURATION: 96 % | BODY MASS INDEX: 25.2 KG/M2 | HEIGHT: 71 IN

## 2024-12-10 DIAGNOSIS — K40.20 NON-RECURRENT BILATERAL INGUINAL HERNIA WITHOUT OBSTRUCTION OR GANGRENE: Primary | ICD-10-CM

## 2024-12-10 DIAGNOSIS — R10.31 INGUINAL PAIN OF BOTH SIDES: ICD-10-CM

## 2024-12-10 DIAGNOSIS — R19.09 INGUINAL SWELLING: ICD-10-CM

## 2024-12-10 DIAGNOSIS — R10.32 INGUINAL PAIN OF BOTH SIDES: ICD-10-CM

## 2024-12-10 PROCEDURE — 99204 OFFICE O/P NEW MOD 45 MIN: CPT | Performed by: SURGERY

## 2024-12-10 RX ORDER — TAMSULOSIN HYDROCHLORIDE 0.4 MG/1
0.4 CAPSULE ORAL DAILY
Qty: 7 CAPSULE | Refills: 0 | Status: SHIPPED | OUTPATIENT
Start: 2024-12-10

## 2024-12-10 NOTE — LETTER
December 10, 2024        RE:   Schuyler Castro 1965      Dear Colleague,    Thank you for referring your patient, Schuyler Castro, to [facility]. Please see a copy of my visit note below.    David to follow up with Primary Care provider regarding elevated blood pressure.      HPI:  Schuyler is a 59 year old male who presents for evaluation of bilateral groin bulging.  He first noticed this recently while on vacation.  He does not recall any specific injury.  The patient does have a history of deep vein thrombosis and pulmonary embolus.  He is maintained on warfarin.  He is also on Rybelsus.    Past Medical History:  Has a past medical history of Diabetes mellitus type 2, noninsulin dependent (H) (2002), DVT of deep femoral vein (H) (8/2010), Hypertension (1996), Pulmonary embolus (H) (8/2010), and Type 2 diabetes     ROS:  The 10 point review of systems is negative other than noted in the HPI and above.    PE:    General- Well-developed, well-nourished, patient able to get up on table without difficulty.  HEENT- Normocephalic and atraumatic. Pupils equal and round.  Mucous membranes moist.  Sclera are nonicteric.  Neck- No lymphadenopathy or masses   Respirations- are regular and non labored  Abdomen is abdomen is soft without significant tenderness, masses, organomegaly or guarding  Hernia- Left inguinal hernia is present with valsalva              Right inguinal hernia is present with valsalva              Both sides are easily reducible.   Umbilical hernia is not present.              External genitalia are normal               Assessment: bilateral inguinal hernias    Plan: I have recommended robotic assisted repair of the patient's bilateral inguinal hernias with mesh.  We have discussed observation, reduction techniques and importance, incarceration and strangulation signs, symptoms and importance as well as need to seek emergency treatment.    We have  discussed surgery in detail, including risk, benefits, complications, mesh, infection, nerve and cord damage and their sequelae including chronic pain and testicular loss, lifting and activity limits after surgery. He has been given literature to review. We will schedule surgery at patient's convenience.        Again, thank you for allowing me to participate in the care of your patient.      Sincerely,      Emanuel Martinez MD

## 2024-12-10 NOTE — PROGRESS NOTES
HPI:  Schuyler is a 59 year old male who presents for evaluation of bilateral groin bulging.  He first noticed this recently while on vacation.  He does not recall any specific injury.  The patient does have a history of deep vein thrombosis and pulmonary embolus.  He is maintained on warfarin.  He is also on Rybelsus.    Past Medical History:   has a past medical history of Diabetes mellitus type 2, noninsulin dependent (H) (2002), DVT of deep femoral vein (H) (8/2010), Hypertension (1996), Pulmonary embolus (H) (8/2010), and Type 2 diabetes mellitus with hyperglycemia (H) (10/25/2015).    Past Surgical History:  Past Surgical History:   Procedure Laterality Date    COLONOSCOPY N/A 5/25/2023    Procedure: Colonoscopy;  Surgeon: Nina Menjivar MD;  Location:  GI        Social History:  Social History     Socioeconomic History    Marital status:      Spouse name: Not on file    Number of children: Not on file    Years of education: Not on file    Highest education level: Not on file   Occupational History     Comment: [employer]   Tobacco Use    Smoking status: Never     Passive exposure: Never    Smokeless tobacco: Never   Vaping Use    Vaping status: Never Used   Substance and Sexual Activity    Alcohol use: Not Currently     Alcohol/week: 0.0 - 1.0 standard drinks of alcohol    Drug use: No    Sexual activity: Yes     Partners: Female   Other Topics Concern    Parent/sibling w/ CABG, MI or angioplasty before 65F 55M? Yes     Comment: mother passed at 58 heart attack   Social History Narrative    Not on file     Social Drivers of Health     Financial Resource Strain: Low Risk  (2/5/2024)    Financial Resource Strain     Within the past 12 months, have you or your family members you live with been unable to get utilities (heat, electricity) when it was really needed?: No   Food Insecurity: Low Risk  (2/5/2024)    Food Insecurity     Within the past 12 months, did you worry that your food would run  out before you got money to buy more?: No     Within the past 12 months, did the food you bought just not last and you didn t have money to get more?: No   Transportation Needs: Low Risk  (2/5/2024)    Transportation Needs     Within the past 12 months, has lack of transportation kept you from medical appointments, getting your medicines, non-medical meetings or appointments, work, or from getting things that you need?: No   Physical Activity: Not on file   Stress: Not on file   Social Connections: Not on file   Interpersonal Safety: Low Risk  (11/25/2024)    Interpersonal Safety     Do you feel physically and emotionally safe where you currently live?: Yes     Within the past 12 months, have you been hit, slapped, kicked or otherwise physically hurt by someone?: No     Within the past 12 months, have you been humiliated or emotionally abused in other ways by your partner or ex-partner?: No   Housing Stability: Low Risk  (2/5/2024)    Housing Stability     Do you have housing? : Yes     Are you worried about losing your housing?: No        Family History:  Family History   Problem Relation Age of Onset    Neurologic Disorder Mother         mysthenia gravis    Dementia Father     Colon Cancer No family hx of          ROS:  The 10 point review of systems is negative other than noted in the HPI and above.    PE:    General- Well-developed, well-nourished, patient able to get up on table without difficulty.  HEENT- Normocephalic and atraumatic. Pupils equal and round.  Mucous membranes moist.  Sclera are nonicteric.  Neck- No lymphadenopathy or masses   Respirations- are regular and non labored  Abdomen is abdomen is soft without significant tenderness, masses, organomegaly or guarding  Hernia- Left inguinal hernia is present with valsalva              Right inguinal hernia is present with valsalva              Both sides are easily reducible.   Umbilical hernia is not present.              External genitalia are  normal               Assessment: bilateral inguinal hernias    Plan: I have recommended robotic assisted repair of the patient's bilateral inguinal hernias with mesh.  We have discussed observation, reduction techniques and importance, incarceration and strangulation signs, symptoms and importance as well as need to seek emergency treatment.    We have discussed surgery in detail, including risk, benefits, complications, mesh, infection, nerve and cord damage and their sequelae including chronic pain and testicular loss, lifting and activity limits after surgery. He has been given literature to review. We will schedule surgery at patient's convenience.        Emanuel Martinez MD    Please route or send letter to:  Primary Care Provider (PCP) and Referring Provider

## 2024-12-11 ENCOUNTER — TELEPHONE (OUTPATIENT)
Dept: SURGERY | Facility: CLINIC | Age: 59
End: 2024-12-11
Payer: COMMERCIAL

## 2024-12-11 NOTE — LETTER
2024       Schuyler Castro  [address] [facility] [address]     RE: [id number]  : 1965    Schuyler Castro has been scheduled for surgery on 25 at 10:20 AM at [facility] with Dr Emanuel Martinez.  The hospital is located at [address] in [place].    Please check in at the Surgery reception desk at 8:20 AM. This is located in the back of the hospital on the East side, just past the Emergency Room entrance.     DO NOT EAT OR DRINK ANYTHING 8 HOURS BEFORE YOUR ARRIVAL TIME.   You may have sips of clear liquids up until 2 hours before your arrival time. If you have been advised to take your medication, please do this early in the morning with just sips of clear liquid.        If you are on a blood thinner such as WARFARIN, please stop taking this 5 days before your surgery.  Please stop taking your GLP-1 (Rybelus) 7 days before your surgery.       Hospital regulations require an updated pre-operative examination to be completed within 30 days of the procedure. This can be done by your primary care provider. Please ask them to fax documentation to [phone]. We also recommend you bring a copy with you.     You should shower before your surgery with Hibiclens or Exidine soap.  This can be found at your local pharmacy or you can pick it up from our office for free.  Please call our office if you have any questions.     You will be required to have an Adult (friend or family member) drive you home after your surgery and arrange for an adult to stay with you until the next morning.     You will receive several calls from our staff 3-7 days prior to your scheduled procedure with further details and to answer any questions you may have.    It is sometimes necessary to adjust the surgery schedule due to emergencies and additions to the schedule.  If your surgery is affected by this, we greatly appreciate your flexibility and understanding in this matter    It is  best if you call regarding post-operative questions between the hours of 8:00 am & 3:00 pm Monday-Friday, so you have access to the daytime care team that know you best.  Prescription refills are accepted during regular office hours only.    Please do not bring any Disability or FMLA papers to the hospital.  They need to be either faxed ([phone]), mailed or hand delivered to our office by you or a family member for completion.  Please allow 14 business days to complete paperwork.        If you have questions or concerns, please contact our office at [phone].

## 2024-12-11 NOTE — LETTER
Showering Before Surgery      Your surgeon has asked you to take 2 showers before surgery.    Why is this important?  It is normal for bacteria (germs) to be on your skin. The skin protects us from these germs. When you have surgery, we cut the skin. Sometimes germs get into the cuts and cause infection (illness caused by germs). By following the instructions below and using special soap, you will lower the number of germs on your skin. This decreases your chance of infection.  Special soap  Buy or get 8 ounces of antiseptic surgical soap called 4% CHG. Common name brands of this soap are Hibiclens and Exidine.  You can find it at your local pharmacy, clinic or retail store. If you have trouble, ask your pharmacist to help you find the right substitute.  A note about shaving:  Do not shave within 12 inches of your incision (surgical cut) area for at least 3 days before surgery. Shaving can make small cuts in the skin. This puts you at a higher risk of infection.  Items you will need for each shower:  1 newly washed towel  4 ounces of one of the above soaps  Clean pajamas or clothes to change into    Follow these instructions:  Follow these steps the evening before surgery and the morning of surgery.  Wash your hair and body with your regular shampoo and soap. Make sure you rinse the shampoo and soap from your hair and body.  Using clean hands, apply about 2 ounces of soap gently on your skin from your ear lobes to your toes. Use on your groin area last. Do not use this soap on your face or head. If you get any soap in your eyes, ears or mouth, rinse right away.  Repeat step 2. It is very important to let the soap stay on your skin for at least 1 minute.    Rinse well and dry off using a clean towel.    If you feel any tingling, itching or other irritation, rinse right away. It is normal to feel some coolness on the skin after using the antiseptic soap. Your skin may feel a bit dry after the shower, but do not use  any lotions, creams or moisturizers. Do not use hair spray or other products in your hair.  5.  Dress in freshly washed clothes or pajamas. Use fresh pillowcases and sheets on your bed.    Repeat these steps the morning of surgery.  If you have any questions about showering or an allergy to CHG soap, please call your surgery center.    For informational purposes only. Not to replace the advice of your health care provider. Copyright   2012 [facility]. All rights reserved. Clinically reviewed by Infection [employer] Practice [employer] Education. [employer] [id number] - REV 12

## 2024-12-11 NOTE — TELEPHONE ENCOUNTER
Type of surgery: ROBOTIC ASSISTED BILATERAL INGUINAL HERNIA REPAIR WITH MESH   Location of surgery: [facility]  Date and time of surgery: 1-13-25, 10:20 AM  Surgeon: DR NOEL   Pre-Op Appt Date: PATIENT TO SCHEDULE   Post-Op Appt Date: NA  Packet sent out: Yes  Pre-cert/Authorization completed:  Not Applicable  Date: 12-11-24      ROBOTIC ASSISTED BILATERAL INGUINAL HERNIA REPAIR WITH MESH GENERAL PT INST TO HAVE H&P 90 MINS REQ PA ASSIST DFB ALW   (85 mins avg)

## 2024-12-18 ENCOUNTER — ANTICOAGULATION THERAPY VISIT (OUTPATIENT)
Dept: ANTICOAGULATION | Facility: CLINIC | Age: 59
End: 2024-12-18

## 2024-12-18 ENCOUNTER — OFFICE VISIT (OUTPATIENT)
Dept: PHARMACY | Facility: CLINIC | Age: 59
End: 2024-12-18
Payer: COMMERCIAL

## 2024-12-18 ENCOUNTER — TELEPHONE (OUTPATIENT)
Dept: ANTICOAGULATION | Facility: CLINIC | Age: 59
End: 2024-12-18

## 2024-12-18 ENCOUNTER — LAB (OUTPATIENT)
Dept: LAB | Facility: CLINIC | Age: 59
End: 2024-12-18
Payer: COMMERCIAL

## 2024-12-18 VITALS
BODY MASS INDEX: 25.1 KG/M2 | SYSTOLIC BLOOD PRESSURE: 126 MMHG | WEIGHT: 180 LBS | OXYGEN SATURATION: 98 % | HEART RATE: 87 BPM | DIASTOLIC BLOOD PRESSURE: 78 MMHG

## 2024-12-18 DIAGNOSIS — Z86.718 PERSONAL HISTORY OF DVT (DEEP VEIN THROMBOSIS): ICD-10-CM

## 2024-12-18 DIAGNOSIS — Z86.711 HISTORY OF PULMONARY EMBOLISM: ICD-10-CM

## 2024-12-18 DIAGNOSIS — Z79.01 LONG TERM CURRENT USE OF ANTICOAGULANTS WITH INR GOAL OF 2.0-3.0: ICD-10-CM

## 2024-12-18 DIAGNOSIS — E11.65 TYPE 2 DIABETES MELLITUS WITH HYPERGLYCEMIA, WITHOUT LONG-TERM CURRENT USE OF INSULIN (H): ICD-10-CM

## 2024-12-18 DIAGNOSIS — I10 HYPERTENSION GOAL BP (BLOOD PRESSURE) < 140/90: Primary | ICD-10-CM

## 2024-12-18 DIAGNOSIS — Z86.711 HISTORY OF PULMONARY EMBOLISM: Primary | ICD-10-CM

## 2024-12-18 DIAGNOSIS — E78.5 HYPERLIPIDEMIA LDL GOAL <100: ICD-10-CM

## 2024-12-18 LAB — INR BLD: 2 (ref 0.9–1.1)

## 2024-12-18 PROCEDURE — 36416 COLLJ CAPILLARY BLOOD SPEC: CPT

## 2024-12-18 PROCEDURE — 85610 PROTHROMBIN TIME: CPT

## 2024-12-18 PROCEDURE — 99207 PR NO CHARGE LOS: CPT | Performed by: PHARMACIST

## 2024-12-18 RX ORDER — ATENOLOL 50 MG/1
50 TABLET ORAL DAILY
Qty: 90 TABLET | Refills: 0 | Status: SHIPPED | OUTPATIENT
Start: 2024-12-18

## 2024-12-18 RX ORDER — LISINOPRIL 20 MG/1
20 TABLET ORAL DAILY
Qty: 90 TABLET | Refills: 0 | Status: SHIPPED | OUTPATIENT
Start: 2024-12-18

## 2024-12-18 RX ORDER — ORAL SEMAGLUTIDE 14 MG/1
14 TABLET ORAL DAILY
Qty: 90 TABLET | Refills: 0 | Status: SHIPPED | OUTPATIENT
Start: 2024-12-18

## 2024-12-18 RX ORDER — ROSUVASTATIN CALCIUM 20 MG/1
20 TABLET, COATED ORAL DAILY
Qty: 90 TABLET | Refills: 0 | Status: SHIPPED | OUTPATIENT
Start: 2024-12-18

## 2024-12-18 NOTE — PROGRESS NOTES
ANTICOAGULATION MANAGEMENT     Schuyler Castro 59 year old male is on warfarin with therapeutic INR result. (Goal INR 2.0-3.0)    Recent labs: (last 7 days)     12/18/24  0824   INR 2.0*       ASSESSMENT     Source(s): Chart Review and Patient/Caregiver Call     Warfarin doses taken: Warfarin taken as instructed  Diet: No new diet changes identified  Medication/supplement changes: None noted  New illness, injury, or hospitalization: No  Signs or symptoms of bleeding or clotting: No  Previous result: Therapeutic last 2(+) visits  Additional findings: Upcoming surgery/procedure hernia surgery 1/13/25 .  Patient was advised by surgeon that a 5 day warfarin hold is needed.    5   PLAN     Recommended plan for no diet, medication or health factor changes affecting INR.    Will review full warfarin hold plan with patient once reviewed by PCP.     Dosing Instructions: Continue your current warfarin dose with next INR in 5 weeks (1 week after surgery)      Summary  As of 12/18/2024      Full warfarin instructions:  1/8: Hold; 1/9: Hold; 1/10: Hold; 1/11: Hold; 1/12: Hold; Otherwise 5 mg every Tue, Sat; 7.5 mg all other days   Next INR check:  1/20/2025               Telephone call with David who agrees to plan and repeated back plan correctly    Lab visit scheduled    Education provided: Please call back if any changes to your diet, medications or how you've been taking warfarin    Plan made per [facility] anticoagulation protocol    Violeta Sanchez RN  12/18/2024  Anticoagulation Clinic  [facility] [employer] for routing messages: karime GARCIA  [facility] patient phone line: [phone]        _______________________________________________________________________     Anticoagulation Episode Summary       Current INR goal:  2.0-3.0   TTR:  100.0% (1 y)   Target end date:  Indefinite   Send INR reminders to:  CASEY GARCIA    Indications    Personal history of DVT (deep vein thrombosis) (Resolved) [Z56.643]  History of pulmonary  embolism [Z86.711]  Long term current use of anticoagulants with INR goal of 2.0-3.0 [Z79.01]  Personal history of DVT (deep vein thrombosis) [Z86.718]             Comments:  6/7/21-OK for 12 week INR monitoring             Anticoagulation Care Providers       Provider Role Specialty Phone number    Rasheeda Juan MD Referring Family Medicine [phone]    Jess Kauffman APRN CNP Referring Family Medicine [phone]

## 2024-12-18 NOTE — PATIENT INSTRUCTIONS
"Recommendations from today's MTM visit:                                                       No changes today.  Continue to avoid the energy drinks. Your blood pressure is better.  Schedule diabetes eye exam.    Follow-up: Return in about 6 months (around 6/18/2025).    It was great speaking with you today.  I value your experience and would be very thankful for your time in providing feedback in our clinic survey. In the next few days, you may receive an email or text message from [employer] with a link to a survey related to your  clinical pharmacist.\"     To schedule another MTM appointment, please call the clinic directly or you may call the MTM scheduling line at [phone] or toll-free at [phone].     My Clinical Pharmacist's contact information:                                                      Please feel free to contact me with any questions or concerns you have.      Yenifer De La Fuente, PharmD  Medication Therapy Management Pharmacist  [facility] - Monday and Wednesday 7:30 - 4:00    "

## 2024-12-18 NOTE — TELEPHONE ENCOUNTER
AMBER-PROCEDURAL ANTICOAGULATION  MANAGEMENT    Schuyler requesting pre-procedure hold orders for warfarin and review for bridging      Procedure date: 1/13/25       Procedure: Hernia Repair      Procedure location and phone number (if external): [place]     Number of warfarin hold days requested and/or target INR: 5 days    Pre-op date:  1/2/25      Routing to Anticoagulation Pharmacist for review.     ACC pool: karime Sanchez RN

## 2024-12-18 NOTE — PROGRESS NOTES
Medication Therapy Management (MTM) Encounter    ASSESSMENT:                            Medication Adherence/Access: No issues identified.    Diabetes:   Patient is meeting A1c goal of < 7%. Self monitoring of blood glucose is at goal of fasting  mg/dL Patient would benefit from no change today. Encouraged continued lifestyle to maintain glycemic control. Aspirin therapy is not indicated in this patient due to anticoagulant/antiplatelet therapy. Recommended he schedule diabetes eye exam. He is scheduled to follow-up with primary care provider in February and will have labs that will be due at that time.    Hypertension:   Patient is meeting blood pressure goal of < 140/90mmHg.    Refills for his meds sent today per pt request to get him through until he sees his primary care provider as scheduled in February.    PLAN:                            No changes today.  Continue to avoid the energy drinks. Your blood pressure is better.  Schedule diabetes eye exam.    Follow-up: Return in about 6 months (around 6/18/2025).    SUBJECTIVE/OBJECTIVE:                          David Castro is a 59 year old male seen for a follow-up visit from 7/22/24.       Reason for visit: follow-up diabetes and blood pressure.    Allergies/ADRs: Reviewed in chart  Past Medical History: Reviewed in chart  Tobacco: He reports that he has never smoked. He has never been exposed to tobacco smoke. He has never used smokeless tobacco.  Alcohol: not currently using    Medication Adherence/Access: no issues reported.    Diabetes   Rybelsus 14 mg once daily - has been better at taking on empty stomach (on this dose since 10/2021)  metformin 1000 mg twice daily  Jardiance 10 mg once daily     Medication History: glipizide XL 5 mg daily stopped due to low blood sugars.    Patient is not experiencing side effects. He is not interested in using a continuous glucose monitor at this time. He really doesn't want to use injectable medicine.     Current  diabetes symptoms: none, no hypoglycemia    Diet/Exercise: He has been doing less activity but plans to do walking. He is on a low carb diet. He 40/50/60 grams of carbs per meal or lower and having 1-2 snacks per day (15-20 grams of carbs per snack). He has cut out evening snack.    Blood sugar monitorin-2 time(s) daily; Ranges: AM fasting          From last MTM visit:         He has lost almost 100 lbs in the last 3 years.    Wt Readings from Last 5 Encounters:   24 180 lb (81.6 kg)   12/10/24 180 lb (81.6 kg)   24 180 lb 1.6 oz (81.7 kg)   24 175 lb 12.8 oz (79.7 kg)   24 181 lb (82.1 kg)        Eye exam in the last 12 months? No  Foot exam is up to date      Hypertension   Lisinopril 20 mg one tablet daily   Atenolol 50 mg once daily    Patient reports no current medication side effects. Reports no chest pain or shortness  He is not watching sodium intake currently. Patient does self-monitor blood pressure.  He has a home wrist cuff with the following readings: 132/83, 119/73, 129/79, 122/72, 120/77, 123/77, 104/63. We have checked wrist cuff in past and it was accurate.    Medication History: He had been on lisinopril/hydrochlorothiazide in the past but that was stopped due to blood pressure being well controlled and slightly elevated creatinine after starting Jardiance.    He does have monster/rockstar drinks but has cut back to once a week. He did not have one today.          Today's Vitals: /78   Pulse 87   Wt 180 lb (81.6 kg)   SpO2 98%   BMI 25.10 kg/m    ----------------    I spent 20 minutes with this patient today.      A summary of these recommendations was sent via [employer].    Yenifer De La Fuente, PharmD  Medication Therapy Management Pharmacist     Medication Therapy Recommendations  No medication therapy recommendations to display

## 2024-12-23 NOTE — TELEPHONE ENCOUNTER
"AMBER-PROCEDURAL ANTICOAGULATION  MANAGEMENT    ASSESSMENT     Warfarin interruption plan for robot assisted inguinal hernia repair on 1/13/25.    Indication for Anticoagulation: DVT and PE    History of unprovoked right lower extremity deep vein thrombosis and extensive bilateral pulmonary embolism in 08/2010     Amber-Procedure Risk stratification for thromboembolism: low (2022 Chest guidelines)    VTE: 2022 CHEST Perioperative Management guidelines suggest against bridging for patients with Hx of VTE as sole clinical indication for warfarin except in high risk stratification patients.    RECOMMENDATION     Pre-Procedure:  Hold warfarin for 5 days, until after procedure starting: Wednesday, January 8   No Bridge    Post-Procedure:  Resume warfarin dose if okay with provider doing procedure on night of procedure, Monday, January 13 PM: take 10 mg x 2, then resume home dose  Recheck INR ~ 7 days after resuming warfarin     Plan routed to referring provider for approval  ?   Mary Anne Morris [facility]    SUBJECTIVE/OBJECTIVE     Schuyler Castro, a 59 year old male    Goal INR Range: 2.0-3.0     Patient bridged in past: No    Wt Readings from Last 3 Encounters:   12/18/24 81.6 kg (180 lb)   12/10/24 81.6 kg (180 lb)   11/25/24 81.7 kg (180 lb 1.6 oz)      Ideal body weight: 75.3 kg (166 lb 0.1 oz)  Adjusted ideal body weight: 77.8 kg (171 lb 9.7 oz)     Estimated body mass index is 25.1 kg/m  as calculated from the following:    Height as of 12/10/24: 1.803 m (5' 11\").    Weight as of an earlier encounter on 12/18/24: 81.6 kg (180 lb).    Lab Results   Component Value Date    INR 2.0 (H) 12/18/2024    INR 2.4 (H) 09/26/2024    INR 3.0 (H) 07/22/2024     Lab Results   Component Value Date    HGB 14.1 09/03/2013    HCT 42.0 09/03/2013     09/03/2013     Lab Results   Component Value Date    CR 1.09 11/29/2023    CR 1.55 (H) 10/30/2023    CR 1.70 (H) 04/17/2023     CrCl cannot be calculated (Patient's most recent lab " result is older than the maximum 365 days allowed.).

## 2024-12-31 NOTE — TELEPHONE ENCOUNTER
Warfarin hold and resumption reviewed with patient, next INR scheduled on 1/20/25.    Lyly Florian RN  Anticoagulation Clinic

## 2025-01-02 ENCOUNTER — OFFICE VISIT (OUTPATIENT)
Dept: FAMILY MEDICINE | Facility: CLINIC | Age: 60
End: 2025-01-02
Payer: COMMERCIAL

## 2025-01-02 VITALS
OXYGEN SATURATION: 98 % | SYSTOLIC BLOOD PRESSURE: 130 MMHG | HEART RATE: 76 BPM | RESPIRATION RATE: 12 BRPM | HEIGHT: 70 IN | DIASTOLIC BLOOD PRESSURE: 80 MMHG | BODY MASS INDEX: 26.63 KG/M2 | TEMPERATURE: 97.9 F | WEIGHT: 186 LBS

## 2025-01-02 DIAGNOSIS — Z01.818 PREOP GENERAL PHYSICAL EXAM: Primary | ICD-10-CM

## 2025-01-02 DIAGNOSIS — Z86.711 HISTORY OF PULMONARY EMBOLISM: ICD-10-CM

## 2025-01-02 DIAGNOSIS — R10.31 INGUINAL PAIN OF BOTH SIDES: ICD-10-CM

## 2025-01-02 DIAGNOSIS — R10.32 INGUINAL PAIN OF BOTH SIDES: ICD-10-CM

## 2025-01-02 DIAGNOSIS — Z79.01 LONG TERM CURRENT USE OF ANTICOAGULANTS WITH INR GOAL OF 2.0-3.0: ICD-10-CM

## 2025-01-02 DIAGNOSIS — I10 HYPERTENSION GOAL BP (BLOOD PRESSURE) < 140/90: ICD-10-CM

## 2025-01-02 DIAGNOSIS — E11.65 TYPE 2 DIABETES MELLITUS WITH HYPERGLYCEMIA, WITHOUT LONG-TERM CURRENT USE OF INSULIN (H): ICD-10-CM

## 2025-01-02 LAB
ANION GAP SERPL CALCULATED.3IONS-SCNC: 12 MMOL/L (ref 7–15)
BUN SERPL-MCNC: 16.3 MG/DL (ref 6–20)
CALCIUM SERPL-MCNC: 9.7 MG/DL (ref 8.8–10.4)
CHLORIDE SERPL-SCNC: 100 MMOL/L (ref 98–107)
CREAT SERPL-MCNC: 0.97 MG/DL (ref 0.67–1.17)
EGFRCR SERPLBLD CKD-EPI 2021: >90 ML/MIN/1.73M2
ERYTHROCYTE [DISTWIDTH] IN BLOOD BY AUTOMATED COUNT: 13.2 % (ref 10–15)
EST. AVERAGE GLUCOSE BLD GHB EST-MCNC: 134 MG/DL
GLUCOSE SERPL-MCNC: 145 MG/DL (ref 70–99)
HBA1C MFR BLD: 6.3 % (ref 0–5.6)
HCO3 SERPL-SCNC: 24 MMOL/L (ref 22–29)
HCT VFR BLD AUTO: 40.5 % (ref 40–53)
HGB BLD-MCNC: 13.7 G/DL (ref 13.3–17.7)
MCH RBC QN AUTO: 31.3 PG (ref 26.5–33)
MCHC RBC AUTO-ENTMCNC: 33.8 G/DL (ref 31.5–36.5)
MCV RBC AUTO: 93 FL (ref 78–100)
PLATELET # BLD AUTO: 245 10E3/UL (ref 150–450)
POTASSIUM SERPL-SCNC: 4.5 MMOL/L (ref 3.4–5.3)
RBC # BLD AUTO: 4.38 10E6/UL (ref 4.4–5.9)
SODIUM SERPL-SCNC: 136 MMOL/L (ref 135–145)
WBC # BLD AUTO: 8.4 10E3/UL (ref 4–11)

## 2025-01-02 NOTE — PROGRESS NOTES
Preoperative Evaluation  [facility]  [address]  [address]  Phone: [phone]  Primary Provider: CHIQUI Mike CNP  Pre-op Performing Provider: CHIQUI Jurado CNP  Jan 2, 2025 1/2/2025   Surgical Information   What procedure is being done? hernia   Facility or Hospital where procedure/surgery will be performed: [facility]   Who is doing the procedure / surgery? Emanuel Amador   Date of surgery / procedure: Janury 13   Time of surgery / procedure: 820   Where do you plan to recover after surgery? at home with family     Fax number for surgical facility: Note does not need to be faxed, will be available electronically in Epic.    Assessment & Plan     The proposed surgical procedure is considered INTERMEDIATE risk.    (Z01.818) Preop general physical exam  (primary encounter diagnosis)  (R10.31,  R10.32) Inguinal pain of both sides  Comment: OK to proceed w/ procedure. Medication hold times discussed w/ patient. Labs updated today. Will follow up on results and whether further direction is necessary. Patient fully understands and is agreeable with plan of care, at this point patient will follow up as needed unless acute concerns arise in the meantime.  Plan: Basic metabolic panel  (Ca, Cl, CO2, Creat,         Gluc, K, Na, BUN), CBC with platelets    (E11.65) Type 2 diabetes mellitus with hyperglycemia, without long-term current use of insulin (H)  Comment: A1C repeat today. Previous 6.1.   Plan: Hemoglobin A1c    (I10) Hypertension goal BP (blood pressure) < 140/90  Comment: better on recheck. At home typically runs around 115-120/70s. Controlled. Continue current regimen, continue to monitor.   Plan: Basic metabolic panel  (Ca, Cl, CO2, Creat,         Gluc, K, Na, BUN)    (Z86.711) History of pulmonary embolism  (Z79.01) Long term current use of anticoagulants with INR goal of 2.0-3.0  Comment: discussed plan for holding warfarin for surgery.       The longitudinal plan of care for the diagnosis(es)/condition(s) as documented were addressed during this visit. Due to the added complexity in care, I will continue to support David in the subsequent management and with ongoing continuity of care.     - No identified additional risk factors other than previously addressed    Antiplatelet or Anticoagulation Medication Instructions   - warfarin: Thromboembolic risk is low (<4%/year). DO NOT TAKE warfarin for 5 days without bridging before procedure.     Additional Medication Instructions  Take all scheduled medications on the day of surgery EXCEPT for modifications listed below:   - Herbal medications and vitamins: DO NOT TAKE 14 days prior to surgery.   - ACE/ARB/ARNI (lisinopril, enalapril, losartan, valsartan, olmesartan, sacubritril/valsartan) : DO NOT TAKE on day of surgery (minimum 11 hours for general anesthesia).   - Beta Blockers (atenolol, metoprolol, propranolol) : Continue taking on the day of surgery.   - Statins (atorvastatin, simvastatin, pravastatin) : Continue taking on the day of surgery.    - metformin: DO NOT TAKE day of surgery.   - SGLT2 Inhibitor (canagliflozin, dapagliflozin, or empagliflozin): DO NOT TAKE 3 days before surgery.    - GLP-1 oral semaglutide: DO NOT TAKE 7 days before surgery    Recommendation  Approval given to proceed with proposed procedure, without further diagnostic evaluation.    Subjective   David is a 59 year old, presenting for the following:  Pre-Op Exam        1/2/2025     7:44 AM   Additional Questions   Roomed by Tamara CR related to upcoming procedure: bilateral inguinal hernia needing repair        1/2/2025   Pre-Op Questionnaire   Have you ever had a heart attack or stroke? No   Have you ever had surgery on your heart or blood vessels, such as a stent placement, a coronary artery bypass, or surgery on an artery in your head, neck, heart, or legs? No   Do you have chest pain with activity? No   Do you  have a history of heart failure? No   Do you currently have a cold, bronchitis or symptoms of other infection? No   Do you have a cough, shortness of breath, or wheezing? No   Do you or anyone in your family have previous history of blood clots? (!) YES pt did, and currently on on blood thinners   Do you or does anyone in your family have a serious bleeding problem such as prolonged bleeding following surgeries or cuts? No   Have you ever had problems with anemia or been told to take iron pills? No   Have you had any abnormal blood loss such as black, tarry or bloody stools? No   Have you ever had a blood transfusion? (!) YES   Have you ever had a transfusion reaction? No   Are you willing to have a blood transfusion if it is medically needed before, during, or after your surgery? Yes   Have you or any of your relatives ever had problems with anesthesia? No   Do you have sleep apnea, excessive snoring or daytime drowsiness? No   Do you have any artifical heart valves or other implanted medical devices like a pacemaker, defibrillator, or continuous glucose monitor? No   Do you have artificial joints? No   Are you allergic to latex? No     Health Care Directive  Patient does not have a Health Care Directive: Patient states has Advance Directive and will bring in a copy to clinic.    Preoperative Review of    reviewed - no record of controlled substances prescribed.      Status of Chronic Conditions:  DIABETES - Patient has a longstanding history of DiabetesType Type II . Patient is being treated with diet and oral agents and denies significant side effects. Control has been good. Complicating factors include but are not limited to: hypertension and hyperlipidemia.     HYPERLIPIDEMIA - Patient has a long history of significant Hyperlipidemia requiring medication for treatment with recent good control. Patient reports no problems or side effects with the medication.     HYPERTENSION - Patient has longstanding  history of HTN , currently denies any symptoms referable to elevated blood pressure. Specifically denies chest pain, palpitations, dyspnea, orthopnea, PND or peripheral edema. Blood pressure readings have been in normal range. Current medication regimen is as listed below. Patient denies any side effects of medication.     Patient Active Problem List    Diagnosis Date Noted    Personal history of DVT (deep vein thrombosis) 03/07/2022     Priority: Medium    Encounter for preventive measure 08/18/2021     Priority: Medium    History of pulmonary embolism 04/09/2020     Priority: Medium    Long term current use of anticoagulants with INR goal of 2.0-3.0 04/09/2020     Priority: Medium    Seborrheic keratosis 04/24/2019     Priority: Medium     Extensive.      AK (actinic keratosis) 04/24/2019     Priority: Medium    Tinea pedis of both feet 04/24/2019     Priority: Medium    Type 2 diabetes mellitus with hyperglycemia (H) 10/25/2015     Priority: Medium    Hypertension goal BP (blood pressure) < 140/90 10/01/2013     Priority: Medium    Hyperlipidemia LDL goal <100 04/17/2013     Priority: Medium      Past Medical History:   Diagnosis Date    Diabetes mellitus type 2, noninsulin dependent (H) 2002    DVT of deep femoral vein (H) 8/2010    Hypertension 1996    Pulmonary embolus (H) 8/2010    Type 2 diabetes mellitus with hyperglycemia (H) 10/25/2015     Past Surgical History:   Procedure Laterality Date    COLONOSCOPY N/A 5/25/2023    Procedure: Colonoscopy;  Surgeon: Nina Menjivar MD;  Location:  GI     Current Outpatient Medications   Medication Sig Dispense Refill    Acetaminophen (TYLENOL PO) Take 650 mg by mouth      atenolol (TENORMIN) 50 MG tablet Take 1 tablet (50 mg) by mouth daily. 90 tablet 0    blood glucose (NO BRAND SPECIFIED) lancets standard Use to test blood sugar 2-3 times daily or as directed. 90 each 11    blood glucose (NO BRAND SPECIFIED) test strip Use to test blood sugar 2-3 times daily or  "as directed. 100 strip 11    blood glucose monitoring (NO BRAND SPECIFIED) meter device kit Use to test blood sugar 2-3 times daily or as directed. 1 kit 0    empagliflozin (JARDIANCE) 10 MG TABS tablet Take 1 tablet (10 mg) by mouth daily. 90 tablet 0    lisinopril (ZESTRIL) 20 MG tablet Take 1 tablet (20 mg) by mouth daily. (Replaces lisinopril/hydrochlorothiazide) 90 tablet 0    metFORMIN (GLUCOPHAGE) 500 MG tablet Take 2 tablets (1,000 mg) by mouth 2 times daily (with meals). 360 tablet 0    rosuvastatin (CRESTOR) 20 MG tablet Take 1 tablet (20 mg) by mouth daily. 90 tablet 0    Semaglutide (RYBELSUS) 14 MG tablet Take 14 mg by mouth daily. 90 tablet 0    tamsulosin (FLOMAX) 0.4 MG capsule Take 1 capsule (0.4 mg) by mouth daily. Begin 4 days before surgery, including morning of surgery 7 capsule 0    warfarin ANTICOAGULANT (COUMADIN) 5 MG tablet TAKE 5 MG BY MOUTH EVERY TUE & SAT + 7.5 MG REST OF WEEK OR PER INR CLINIC 124 tablet 1       No Known Allergies     Social History     Tobacco Use    Smoking status: Never     Passive exposure: Never    Smokeless tobacco: Never   Substance Use Topics    Alcohol use: Not Currently     Alcohol/week: 0.0 - 1.0 standard drinks of alcohol     Family History   Problem Relation Age of Onset    Neurologic Disorder Mother         mysthenia gravis    Dementia Father     Colon Cancer No family hx of      History   Drug Use No         Review of Systems  Constitutional, HEENT, cardiovascular, pulmonary, GI, , musculoskeletal, neuro, skin, endocrine and psych systems are negative, except as otherwise noted.    Objective    /80 (BP Location: Right arm, Patient Position: Sitting, Cuff Size: Adult Regular)   Pulse 76   Temp 97.9  F (36.6  C) (Oral)   Resp 12   Ht 1.778 m (5' 10\")   Wt 84.4 kg (186 lb)   SpO2 98%   BMI 26.69 kg/m     Estimated body mass index is 26.69 kg/m  as calculated from the following:    Height as of this encounter: 1.778 m (5' 10\").    Weight as of " this encounter: 84.4 kg (186 lb).  Physical Exam  Vitals and nursing note reviewed.   Constitutional:       General: He is not in acute distress.     Appearance: Normal appearance. He is not ill-appearing.   HENT:      Right Ear: Tympanic membrane, ear canal and external ear normal. There is no impacted cerumen.      Left Ear: Tympanic membrane, ear canal and external ear normal. There is no impacted cerumen.      Nose: No congestion or rhinorrhea.      Mouth/Throat:      Mouth: Mucous membranes are moist.      Pharynx: No oropharyngeal exudate or posterior oropharyngeal erythema.   Eyes:      General:         Right eye: No discharge.         Left eye: No discharge.      Conjunctiva/sclera: Conjunctivae normal.      Pupils: Pupils are equal, round, and reactive to light.   Cardiovascular:      Rate and Rhythm: Normal rate and regular rhythm.      Heart sounds: No murmur heard.     No friction rub. No gallop.   Pulmonary:      Effort: No respiratory distress.      Breath sounds: Normal breath sounds. No stridor. No wheezing, rhonchi or rales.   Musculoskeletal:      Cervical back: No tenderness.   Lymphadenopathy:      Cervical: No cervical adenopathy.   Skin:     General: Skin is warm and dry.   Neurological:      General: No focal deficit present.      Mental Status: He is alert.   Psychiatric:         Mood and Affect: Mood normal.         Behavior: Behavior normal.         Thought Content: Thought content normal.         Judgment: Judgment normal.         Recent Labs   Lab Test 12/18/24  0824 09/26/24  0727 07/22/24  0857   INR 2.0* 2.4* 3.0*   A1C  --   --  6.1*      Diagnostics  Labs pending at this time.  Results will be reviewed when available.   No EKG required, no history of coronary heart disease, significant arrhythmia, peripheral arterial disease or other structural heart disease.    Revised Cardiac Risk Index (RCRI)  The patient has the following serious cardiovascular risks for perioperative  complications:   - No serious cardiac risks = 0 points     RCRI Interpretation: 0 points: Class I (very low risk - 0.4% complication rate)    Signed Electronically by: CHIQUI Jurado CNP  A copy of this evaluation report is provided to the requesting physician.

## 2025-01-02 NOTE — PATIENT INSTRUCTIONS
How to Take Your Medication Before Surgery  Preoperative Medication Instructions   Antiplatelet or Anticoagulation Medication Instructions   - warfarin: Thromboembolic risk is low (<4%/year). DO NOT TAKE warfarin for 5 days without bridging before procedure.     Additional Medication Instructions  Take all scheduled medications on the day of surgery EXCEPT for modifications listed below:   - Herbal medications and vitamins: DO NOT TAKE 14 days prior to surgery.   - ACE/ARB/ARNI (lisinopril, enalapril, losartan, valsartan, olmesartan, sacubritril/valsartan) : DO NOT TAKE on day of surgery (minimum 11 hours for general anesthesia).   - Beta Blockers (atenolol, metoprolol, propranolol) : Continue taking on the day of surgery.   - Statins (atorvastatin, simvastatin, pravastatin) : Continue taking on the day of surgery.    - metformin: DO NOT TAKE day of surgery.   - SGLT2 Inhibitor (canagliflozin, dapagliflozin, or empagliflozin): DO NOT TAKE 3 days before surgery.    - GLP-1 oral semaglutide: DO NOT TAKE 7 days before surgery       Patient Education   Preparing for Your Surgery  For Adults  Getting started  In most cases, a nurse will call to review your health history and instructions. They will give you an arrival time based on your scheduled surgery time. Please be ready to share:  Your doctor's clinic name and phone number  Your medical, surgical, and anesthesia history  A list of allergies and sensitivities  A list of medicines, including herbal treatments and over-the-counter drugs  Whether the patient has a legal guardian (ask how to send us the papers in advance)  Note: You may not receive a call if you were seen at our PAC (Preoperative Assessment Center).  Please tell us if you're pregnant--or if there's any chance you might be pregnant. Some surgeries may injure a fetus (unborn baby), so they require a pregnancy test. Surgeries that are safe for a fetus don't always need a test, and you can choose whether to  have one.   Preparing for surgery  Within 10 to 30 days of surgery: Have a pre-op exam (sometimes called an H&P, or History and Physical). This can be done at a clinic or pre-operative center.  If you're having a , you may not need this exam. Talk to your care team.  At your pre-op exam, talk to your care team about all medicines you take. (This includes CBD oil and any drugs, such as THC, marijuana, and other forms of cannabis.) If you need to stop any medicine before surgery, ask when to start taking it again.  This is for your safety. Many medicines and drugs can make you bleed too much during surgery. Some change how well surgery (anesthesia) drugs work.  Call your insurance company to let them know you're having surgery. (If you don't have insurance, call [phone].)  Call your clinic if there's any change in your health. This includes a scrape or scratch near the surgery site, or any signs of a cold (sore throat, runny nose, cough, rash, fever).  Eating and drinking guidelines  For your safety: Unless your surgeon tells you otherwise, follow the guidelines below.  Eat and drink as normal until 8 hours before you arrive for surgery. After that, no food or milk. You can spit out gum when you arrive.  Drink clear liquids until 2 hours before you arrive. These are liquids you can see through, like water, Gatorade, and Propel Water. They also include plain black coffee and tea (no cream or milk).  No alcohol for 24 hours before you arrive. The night before surgery, stop any drinks that contain THC.  If your care team tells you to take medicine on the morning of surgery, it's okay to take it with a sip of water. No other medicines or drugs are allowed (including CBD oil)--follow your care team's instructions.  If you have questions the day of surgery, call your hospital or surgery center.   Preventing infection  Shower or bathe the night before and the morning of surgery. Follow the instructions your  clinic gave you. (If no instructions, use regular soap.)  Don't shave or clip hair near your surgery site. We'll remove the hair if needed.  Don't smoke or vape the morning of surgery. No chewing tobacco for 6 hours before you arrive. A nicotine patch is okay. You may spit out nicotine gum when you arrive.  For some surgeries, the surgeon will tell you to fully quit smoking and nicotine.  We will make every effort to keep you safe from infection. We will:  Clean our hands often with soap and water (or an alcohol-based hand rub).  Clean the skin at your surgery site with a special soap that kills germs.  Give you a special gown to keep you warm. (Cold raises the risk of infection.)  Wear hair covers, masks, gowns, and gloves during surgery.  Give antibiotic medicine, if prescribed. Not all surgeries need this medicine.  What to bring on the day of surgery  Photo ID and insurance card  Copy of your health care directive, if you have one  Glasses and hearing aids (bring cases)  You can't wear contacts during surgery  Inhaler and eye drops, if you use them (tell us about these when you arrive)  CPAP machine or breathing device, if you use them  A few personal items, if spending the night  If you have . . .  A pacemaker, ICD (cardiac defibrillator), or other implant: Bring the ID card.  An implanted stimulator: Bring the remote control.  A legal guardian: Bring a copy of the certified (court-stamped) guardianship papers.  Please remove any jewelry, including body piercings. Leave jewelry and other valuables at home.  If you're going home the day of surgery  You must have a responsible adult drive you home. They should stay with you overnight as well.  If you don't have someone to stay with you, and you aren't safe to go home alone, we may keep you overnight. Insurance often won't pay for this.  After surgery  If it's hard to control your pain or you need more pain medicine, please call your surgeon's office.  Questions?    If you have any questions for your care team, list them here:   ____________________________________________________________________________________________________________________________________________________________________________________________________________________________________________________________  For informational purposes only. Not to replace the advice of your health care provider. Copyright   2003, 2019 [facility]. All rights reserved. Clinically reviewed by Terry Parisi MD. [employer] [id number] - REV 08/24.

## 2025-01-13 ENCOUNTER — ANESTHESIA EVENT (OUTPATIENT)
Dept: SURGERY | Facility: CLINIC | Age: 60
End: 2025-01-13
Payer: COMMERCIAL

## 2025-01-13 ENCOUNTER — ANESTHESIA (OUTPATIENT)
Dept: SURGERY | Facility: CLINIC | Age: 60
End: 2025-01-13
Payer: COMMERCIAL

## 2025-01-13 ENCOUNTER — DOCUMENTATION ONLY (OUTPATIENT)
Dept: ANTICOAGULATION | Facility: CLINIC | Age: 60
End: 2025-01-13

## 2025-01-13 ENCOUNTER — HOSPITAL ENCOUNTER (OUTPATIENT)
Facility: CLINIC | Age: 60
Discharge: HOME | End: 2025-01-13
Attending: SURGERY | Admitting: SURGERY
Payer: COMMERCIAL

## 2025-01-13 VITALS
WEIGHT: 184.7 LBS | HEART RATE: 90 BPM | TEMPERATURE: 98.6 F | RESPIRATION RATE: 16 BRPM | HEIGHT: 70 IN | OXYGEN SATURATION: 99 % | DIASTOLIC BLOOD PRESSURE: 77 MMHG | BODY MASS INDEX: 26.44 KG/M2 | SYSTOLIC BLOOD PRESSURE: 138 MMHG

## 2025-01-13 DIAGNOSIS — K40.20 NON-RECURRENT BILATERAL INGUINAL HERNIA WITHOUT OBSTRUCTION OR GANGRENE: ICD-10-CM

## 2025-01-13 LAB
GLUCOSE BLDC GLUCOMTR-MCNC: 145 MG/DL (ref 70–99)
GLUCOSE BLDC GLUCOMTR-MCNC: 195 MG/DL (ref 70–99)

## 2025-01-13 PROCEDURE — 49650 LAP ING HERNIA REPAIR INIT: CPT | Mod: AS | Performed by: PHYSICIAN ASSISTANT

## 2025-01-13 PROCEDURE — 250N000009 HC RX 250: Performed by: SURGERY

## 2025-01-13 PROCEDURE — S2900 ROBOTIC SURGICAL SYSTEM: HCPCS | Performed by: SURGERY

## 2025-01-13 PROCEDURE — 258N000003 HC RX IP 258 OP 636: Performed by: NURSE ANESTHETIST, CERTIFIED REGISTERED

## 2025-01-13 PROCEDURE — 258N000003 HC RX IP 258 OP 636: Performed by: ANESTHESIOLOGY

## 2025-01-13 PROCEDURE — 250N000011 HC RX IP 250 OP 636: Performed by: NURSE ANESTHETIST, CERTIFIED REGISTERED

## 2025-01-13 PROCEDURE — 82962 GLUCOSE BLOOD TEST: CPT

## 2025-01-13 PROCEDURE — 710N000009 HC RECOVERY PHASE 1, LEVEL 1, PER MIN: Performed by: SURGERY

## 2025-01-13 PROCEDURE — 250N000011 HC RX IP 250 OP 636: Performed by: SURGERY

## 2025-01-13 PROCEDURE — 710N000012 HC RECOVERY PHASE 2, PER MINUTE: Performed by: SURGERY

## 2025-01-13 PROCEDURE — 49650 LAP ING HERNIA REPAIR INIT: CPT | Mod: 50 | Performed by: SURGERY

## 2025-01-13 PROCEDURE — 360N000080 HC SURGERY LEVEL 7, PER MIN: Performed by: SURGERY

## 2025-01-13 PROCEDURE — C1781 MESH (IMPLANTABLE): HCPCS | Performed by: SURGERY

## 2025-01-13 PROCEDURE — 250N000013 HC RX MED GY IP 250 OP 250 PS 637: Performed by: ANESTHESIOLOGY

## 2025-01-13 PROCEDURE — 272N000001 HC OR GENERAL SUPPLY STERILE: Performed by: SURGERY

## 2025-01-13 PROCEDURE — 250N000025 HC SEVOFLURANE, PER MIN: Performed by: SURGERY

## 2025-01-13 PROCEDURE — 999N000141 HC STATISTIC PRE-PROCEDURE NURSING ASSESSMENT: Performed by: SURGERY

## 2025-01-13 PROCEDURE — 250N000009 HC RX 250: Performed by: NURSE ANESTHETIST, CERTIFIED REGISTERED

## 2025-01-13 PROCEDURE — 370N000017 HC ANESTHESIA TECHNICAL FEE, PER MIN: Performed by: SURGERY

## 2025-01-13 DEVICE — LAPAROSCOPIC SELF-FIXATING MESH POLYESTER WITH POLYLACTIC ACID GRIPS AND COLLAGEN FILM
Type: IMPLANTABLE DEVICE | Site: INGUINAL | Status: FUNCTIONAL
Brand: PROGRIP

## 2025-01-13 RX ORDER — FENTANYL CITRATE 50 UG/ML
50 INJECTION, SOLUTION INTRAMUSCULAR; INTRAVENOUS EVERY 5 MIN PRN
Status: DISCONTINUED | OUTPATIENT
Start: 2025-01-13 | End: 2025-01-13 | Stop reason: HOSPADM

## 2025-01-13 RX ORDER — PROPOFOL 10 MG/ML
INJECTION, EMULSION INTRAVENOUS PRN
Status: DISCONTINUED | OUTPATIENT
Start: 2025-01-13 | End: 2025-01-13

## 2025-01-13 RX ORDER — PROPOFOL 10 MG/ML
INJECTION, EMULSION INTRAVENOUS CONTINUOUS PRN
Status: DISCONTINUED | OUTPATIENT
Start: 2025-01-13 | End: 2025-01-13

## 2025-01-13 RX ORDER — HYDROMORPHONE HCL IN WATER/PF 6 MG/30 ML
0.4 PATIENT CONTROLLED ANALGESIA SYRINGE INTRAVENOUS EVERY 5 MIN PRN
Status: DISCONTINUED | OUTPATIENT
Start: 2025-01-13 | End: 2025-01-13 | Stop reason: HOSPADM

## 2025-01-13 RX ORDER — SODIUM CHLORIDE, SODIUM LACTATE, POTASSIUM CHLORIDE, CALCIUM CHLORIDE 600; 310; 30; 20 MG/100ML; MG/100ML; MG/100ML; MG/100ML
INJECTION, SOLUTION INTRAVENOUS CONTINUOUS
Status: DISCONTINUED | OUTPATIENT
Start: 2025-01-13 | End: 2025-01-13 | Stop reason: HOSPADM

## 2025-01-13 RX ORDER — NALOXONE HYDROCHLORIDE 0.4 MG/ML
0.1 INJECTION, SOLUTION INTRAMUSCULAR; INTRAVENOUS; SUBCUTANEOUS
Status: DISCONTINUED | OUTPATIENT
Start: 2025-01-13 | End: 2025-01-13 | Stop reason: HOSPADM

## 2025-01-13 RX ORDER — ONDANSETRON 4 MG/1
4 TABLET, ORALLY DISINTEGRATING ORAL EVERY 30 MIN PRN
Status: DISCONTINUED | OUTPATIENT
Start: 2025-01-13 | End: 2025-01-13 | Stop reason: HOSPADM

## 2025-01-13 RX ORDER — OXYCODONE HYDROCHLORIDE 5 MG/1
10 TABLET ORAL
Status: DISCONTINUED | OUTPATIENT
Start: 2025-01-13 | End: 2025-01-13 | Stop reason: HOSPADM

## 2025-01-13 RX ORDER — EPHEDRINE SULFATE 50 MG/ML
INJECTION, SOLUTION INTRAMUSCULAR; INTRAVENOUS; SUBCUTANEOUS PRN
Status: DISCONTINUED | OUTPATIENT
Start: 2025-01-13 | End: 2025-01-13

## 2025-01-13 RX ORDER — HYDRALAZINE HYDROCHLORIDE 20 MG/ML
2.5-5 INJECTION INTRAMUSCULAR; INTRAVENOUS EVERY 10 MIN PRN
Status: DISCONTINUED | OUTPATIENT
Start: 2025-01-13 | End: 2025-01-13 | Stop reason: HOSPADM

## 2025-01-13 RX ORDER — ONDANSETRON 2 MG/ML
INJECTION INTRAMUSCULAR; INTRAVENOUS PRN
Status: DISCONTINUED | OUTPATIENT
Start: 2025-01-13 | End: 2025-01-13

## 2025-01-13 RX ORDER — ONDANSETRON 2 MG/ML
4 INJECTION INTRAMUSCULAR; INTRAVENOUS EVERY 30 MIN PRN
Status: DISCONTINUED | OUTPATIENT
Start: 2025-01-13 | End: 2025-01-13 | Stop reason: HOSPADM

## 2025-01-13 RX ORDER — DEXAMETHASONE SODIUM PHOSPHATE 4 MG/ML
4 INJECTION, SOLUTION INTRA-ARTICULAR; INTRALESIONAL; INTRAMUSCULAR; INTRAVENOUS; SOFT TISSUE
Status: DISCONTINUED | OUTPATIENT
Start: 2025-01-13 | End: 2025-01-13 | Stop reason: HOSPADM

## 2025-01-13 RX ORDER — CEFAZOLIN SODIUM/WATER 2 G/20 ML
2 SYRINGE (ML) INTRAVENOUS SEE ADMIN INSTRUCTIONS
Status: DISCONTINUED | OUTPATIENT
Start: 2025-01-13 | End: 2025-01-13 | Stop reason: HOSPADM

## 2025-01-13 RX ORDER — LABETALOL HYDROCHLORIDE 5 MG/ML
10 INJECTION, SOLUTION INTRAVENOUS
Status: DISCONTINUED | OUTPATIENT
Start: 2025-01-13 | End: 2025-01-13 | Stop reason: HOSPADM

## 2025-01-13 RX ORDER — LIDOCAINE HYDROCHLORIDE 20 MG/ML
INJECTION, SOLUTION INFILTRATION; PERINEURAL PRN
Status: DISCONTINUED | OUTPATIENT
Start: 2025-01-13 | End: 2025-01-13

## 2025-01-13 RX ORDER — HYDROMORPHONE HCL IN WATER/PF 6 MG/30 ML
0.2 PATIENT CONTROLLED ANALGESIA SYRINGE INTRAVENOUS EVERY 5 MIN PRN
Status: DISCONTINUED | OUTPATIENT
Start: 2025-01-13 | End: 2025-01-13 | Stop reason: HOSPADM

## 2025-01-13 RX ORDER — OXYCODONE HYDROCHLORIDE 5 MG/1
5 TABLET ORAL
Status: COMPLETED | OUTPATIENT
Start: 2025-01-13 | End: 2025-01-13

## 2025-01-13 RX ORDER — DEXAMETHASONE SODIUM PHOSPHATE 4 MG/ML
INJECTION, SOLUTION INTRA-ARTICULAR; INTRALESIONAL; INTRAMUSCULAR; INTRAVENOUS; SOFT TISSUE PRN
Status: DISCONTINUED | OUTPATIENT
Start: 2025-01-13 | End: 2025-01-13

## 2025-01-13 RX ORDER — ONDANSETRON 4 MG/1
4 TABLET, ORALLY DISINTEGRATING ORAL EVERY 8 HOURS PRN
Qty: 9 TABLET | Refills: 0 | Status: SHIPPED | OUTPATIENT
Start: 2025-01-13

## 2025-01-13 RX ORDER — CEFAZOLIN SODIUM/WATER 2 G/20 ML
2 SYRINGE (ML) INTRAVENOUS
Status: COMPLETED | OUTPATIENT
Start: 2025-01-13 | End: 2025-01-13

## 2025-01-13 RX ORDER — KETOROLAC TROMETHAMINE 30 MG/ML
INJECTION, SOLUTION INTRAMUSCULAR; INTRAVENOUS PRN
Status: DISCONTINUED | OUTPATIENT
Start: 2025-01-13 | End: 2025-01-13

## 2025-01-13 RX ORDER — FENTANYL CITRATE 50 UG/ML
25 INJECTION, SOLUTION INTRAMUSCULAR; INTRAVENOUS EVERY 5 MIN PRN
Status: DISCONTINUED | OUTPATIENT
Start: 2025-01-13 | End: 2025-01-13 | Stop reason: HOSPADM

## 2025-01-13 RX ORDER — BUPIVACAINE HYDROCHLORIDE AND EPINEPHRINE 5; 5 MG/ML; UG/ML
INJECTION, SOLUTION EPIDURAL; INTRACAUDAL; PERINEURAL PRN
Status: DISCONTINUED | OUTPATIENT
Start: 2025-01-13 | End: 2025-01-13 | Stop reason: HOSPADM

## 2025-01-13 RX ORDER — OXYCODONE HYDROCHLORIDE 5 MG/1
5-10 TABLET ORAL EVERY 4 HOURS PRN
Qty: 12 TABLET | Refills: 0 | Status: SHIPPED | OUTPATIENT
Start: 2025-01-13

## 2025-01-13 RX ORDER — FENTANYL CITRATE 50 UG/ML
INJECTION, SOLUTION INTRAMUSCULAR; INTRAVENOUS PRN
Status: DISCONTINUED | OUTPATIENT
Start: 2025-01-13 | End: 2025-01-13

## 2025-01-13 RX ORDER — ALBUTEROL SULFATE 0.83 MG/ML
2.5 SOLUTION RESPIRATORY (INHALATION) EVERY 4 HOURS PRN
Status: DISCONTINUED | OUTPATIENT
Start: 2025-01-13 | End: 2025-01-13 | Stop reason: HOSPADM

## 2025-01-13 RX ADMIN — ONDANSETRON 4 MG: 2 INJECTION INTRAMUSCULAR; INTRAVENOUS at 11:32

## 2025-01-13 RX ADMIN — PROPOFOL 200 MG: 10 INJECTION, EMULSION INTRAVENOUS at 10:25

## 2025-01-13 RX ADMIN — DEXAMETHASONE SODIUM PHOSPHATE 4 MG: 4 INJECTION, SOLUTION INTRA-ARTICULAR; INTRALESIONAL; INTRAMUSCULAR; INTRAVENOUS; SOFT TISSUE at 10:25

## 2025-01-13 RX ADMIN — PHENYLEPHRINE HYDROCHLORIDE 100 MCG: 10 INJECTION INTRAVENOUS at 11:22

## 2025-01-13 RX ADMIN — SUGAMMADEX 200 MG: 100 INJECTION, SOLUTION INTRAVENOUS at 11:34

## 2025-01-13 RX ADMIN — EPHEDRINE SULFATE 5 MG: 5 INJECTION INTRAVENOUS at 11:29

## 2025-01-13 RX ADMIN — PHENYLEPHRINE HYDROCHLORIDE 200 MCG: 10 INJECTION INTRAVENOUS at 10:41

## 2025-01-13 RX ADMIN — OXYCODONE HYDROCHLORIDE 5 MG: 5 TABLET ORAL at 12:45

## 2025-01-13 RX ADMIN — PHENYLEPHRINE HYDROCHLORIDE 100 MCG: 10 INJECTION INTRAVENOUS at 11:17

## 2025-01-13 RX ADMIN — Medication 2 G: at 10:18

## 2025-01-13 RX ADMIN — PHENYLEPHRINE HYDROCHLORIDE 100 MCG: 10 INJECTION INTRAVENOUS at 10:52

## 2025-01-13 RX ADMIN — LIDOCAINE HYDROCHLORIDE 50 MG: 20 INJECTION, SOLUTION INFILTRATION; PERINEURAL at 10:25

## 2025-01-13 RX ADMIN — ROCURONIUM BROMIDE 50 MG: 50 INJECTION, SOLUTION INTRAVENOUS at 10:25

## 2025-01-13 RX ADMIN — PHENYLEPHRINE HYDROCHLORIDE 100 MCG: 10 INJECTION INTRAVENOUS at 10:35

## 2025-01-13 RX ADMIN — HYDROMORPHONE HYDROCHLORIDE 0.5 MG: 1 INJECTION, SOLUTION INTRAMUSCULAR; INTRAVENOUS; SUBCUTANEOUS at 10:45

## 2025-01-13 RX ADMIN — ROCURONIUM BROMIDE 10 MG: 50 INJECTION, SOLUTION INTRAVENOUS at 11:19

## 2025-01-13 RX ADMIN — EPHEDRINE SULFATE 5 MG: 5 INJECTION INTRAVENOUS at 11:15

## 2025-01-13 RX ADMIN — EPHEDRINE SULFATE 5 MG: 5 INJECTION INTRAVENOUS at 10:55

## 2025-01-13 RX ADMIN — EPHEDRINE SULFATE 5 MG: 5 INJECTION INTRAVENOUS at 11:00

## 2025-01-13 RX ADMIN — PHENYLEPHRINE HYDROCHLORIDE 100 MCG: 10 INJECTION INTRAVENOUS at 11:20

## 2025-01-13 RX ADMIN — PHENYLEPHRINE HYDROCHLORIDE 100 MCG: 10 INJECTION INTRAVENOUS at 11:02

## 2025-01-13 RX ADMIN — FENTANYL CITRATE 100 MCG: 50 INJECTION INTRAMUSCULAR; INTRAVENOUS at 10:25

## 2025-01-13 RX ADMIN — EPHEDRINE SULFATE 5 MG: 5 INJECTION INTRAVENOUS at 11:11

## 2025-01-13 RX ADMIN — KETOROLAC TROMETHAMINE 30 MG: 30 INJECTION, SOLUTION INTRAMUSCULAR at 11:32

## 2025-01-13 RX ADMIN — PROPOFOL 75 MCG/KG/MIN: 10 INJECTION, EMULSION INTRAVENOUS at 10:30

## 2025-01-13 RX ADMIN — MIDAZOLAM 2 MG: 1 INJECTION INTRAMUSCULAR; INTRAVENOUS at 10:20

## 2025-01-13 RX ADMIN — PHENYLEPHRINE HYDROCHLORIDE 200 MCG: 10 INJECTION INTRAVENOUS at 10:53

## 2025-01-13 RX ADMIN — SODIUM CHLORIDE, POTASSIUM CHLORIDE, SODIUM LACTATE AND CALCIUM CHLORIDE: 600; 310; 30; 20 INJECTION, SOLUTION INTRAVENOUS at 10:09

## 2025-01-13 ASSESSMENT — ACTIVITIES OF DAILY LIVING (ADL)
ADLS_ACUITY_SCORE: 21
ADLS_ACUITY_SCORE: 22

## 2025-01-13 NOTE — ANESTHESIA PREPROCEDURE EVALUATION
Anesthesia Pre-Procedure Evaluation    Patient: Schuyler Castro   MRN: [id number] : 1965        Procedure : Procedure(s):  Robotic assisted bilateral inguinal hernia repair with mesh          Past Medical History:   Diagnosis Date    Antiplatelet or antithrombotic long-term use     Diabetes mellitus type 2, noninsulin dependent (H)     DVT of deep femoral vein (H) 2010    Hypertension     Pulmonary embolus (H) 2010    Type 2 diabetes mellitus with hyperglycemia (H) 10/25/2015      Past Surgical History:   Procedure Laterality Date    COLONOSCOPY N/A 2023    Procedure: Colonoscopy;  Surgeon: Nina Menjivar MD;  Location: [facility]      No Known Allergies   Social History     Tobacco Use    Smoking status: Never     Passive exposure: Never    Smokeless tobacco: Never   Substance Use Topics    Alcohol use: Not Currently     Alcohol/week: 0.0 - 1.0 standard drinks of alcohol      Wt Readings from Last 1 Encounters:   25 83.8 kg (184 lb 11.2 oz)        Anesthesia Evaluation            ROS/MED HX  ENT/Pulmonary:  - neg pulmonary ROS     Neurologic:       Cardiovascular:     (+)  hypertension- -   -  - -   Taking blood thinners (history of DVT/PE, held warfarin x 5 days)                                   METS/Exercise Tolerance:     Hematologic:       Musculoskeletal:       GI/Hepatic:       Renal/Genitourinary:       Endo:     (+)  type II DM (on GLP-1 agonist),                    Psychiatric/Substance Use:       Infectious Disease:       Malignancy:       Other:            Physical Exam    Airway        Mallampati: II   TM distance: > 3 FB   Neck ROM: full   Mouth opening: > 3 cm    Respiratory Devices and Support         Dental           Cardiovascular   cardiovascular exam normal          Pulmonary   pulmonary exam normal                OUTSIDE LABS:  CBC:   Lab Results   Component Value Date    WBC 8.4 2025    WBC 9.6 2013    HGB 13.7 2025    HGB 14.1 2013     HCT 40.5 01/02/2025    HCT 42.0 09/03/2013     01/02/2025     09/03/2013     BMP:   Lab Results   Component Value Date     01/02/2025     (L) 11/29/2023    POTASSIUM 4.5 01/02/2025    POTASSIUM 4.6 11/29/2023    CHLORIDE 100 01/02/2025    CHLORIDE 99 11/29/2023    CO2 24 01/02/2025    CO2 23 11/29/2023    BUN 16.3 01/02/2025    BUN 20.4 (H) 11/29/2023    CR 0.97 01/02/2025    CR 1.09 11/29/2023     (H) 01/13/2025     (H) 01/02/2025     COAGS:   Lab Results   Component Value Date    INR 2.0 (H) 12/18/2024     POC:   Lab Results   Component Value Date     (H) 08/13/2010     HEPATIC:   Lab Results   Component Value Date    ALBUMIN 4.0 06/06/2022    PROTTOTAL 8.2 06/06/2022    ALT 63 06/06/2022    AST 35 06/06/2022    ALKPHOS 58 06/06/2022    BILITOTAL 0.5 06/06/2022     OTHER:   Lab Results   Component Value Date    A1C 6.3 (H) 01/02/2025    CINDY 9.7 01/02/2025    TSH 3.20 08/18/2021    T4 1.07 08/09/2010       Anesthesia Plan    ASA Status:  2    NPO Status:  NPO Appropriate    Anesthesia Type: General.     - Airway: ETT   Induction: Intravenous.   Maintenance: Balanced.        Consents    Anesthesia Plan(s) and associated risks, benefits, and realistic alternatives discussed. Questions answered and patient/representative(s) expressed understanding.     - Discussed:     - Discussed with:  Patient            Postoperative Care    Pain management: IV analgesics, Oral pain medications, Multi-modal analgesia.   PONV prophylaxis: Ondansetron (or other 5HT-3), Dexamethasone or Solumedrol     Comments:               Sydnie Bianchi MD    I have reviewed the pertinent notes and labs in the chart from the past 30 days and (re)examined the patient.  Any updates or changes from those notes are reflected in this note.            # Drug Induced Coagulation Defect: home medication list includes an anticoagulant medication    # Hypertension: Noted on problem list           # Overweight:  "Estimated body mass index is 26.5 kg/m  as calculated from the following:    Height as of this encounter: 1.778 m (5' 10\").    Weight as of this encounter: 83.8 kg (184 lb 11.2 oz).             "

## 2025-01-13 NOTE — ANESTHESIA PROCEDURE NOTES
Airway         Procedure Start/Stop Times: 1/13/2025 10:29 AM  Staff -        CRNA: Milly Garcia APRN CRNA       Performed By: CRNA  Consent for Airway        Urgency: elective  Indications and Patient Condition       Indications for airway management: natacha-procedural       Induction type:intravenous       Mask difficulty assessment: 1 - vent by mask    Final Airway Details       Final airway type: endotracheal airway       Successful airway: ETT - single  Endotracheal Airway Details        ETT size (mm): 8.0       Cuffed: yes       Cuff volume (mL): 6       Successful intubation technique: direct laryngoscopy       DL Blade Type: Garsia 2       Grade View of Cords: 1       Adjucts: stylet       Position: Right       Measured from: lips       Secured at (cm): 23       Bite block used: None    Post intubation assessment        Placement verified by: capnometry, equal breath sounds and chest rise        Number of attempts at approach: 1       Number of other approaches attempted: 0       Secured with: tape       Ease of procedure: easy       Dentition: Intact and Unchanged    Medication(s) Administered   Medication Administration Time: 1/13/2025 10:29 AM

## 2025-01-13 NOTE — PROGRESS NOTES
"ANTICOAGULATION  MANAGEMENT: Discharge Review    Schuyler Castro chart reviewed for anticoagulation continuity of care    Outpatient surgery/procedure on 1/13/25 for hernia repair.    Discharge disposition: Home    Results:    No results for input(s): \"INR\", \"[id number]\", \"F2\", \"ALMWH\", \"AAUFH\" in the last 168 hours.  Anticoagulation inpatient management:     not applicable     Anticoagulation discharge instructions:     Warfarin dosing: home regimen continued + 2 booster doses   Bridging: No   INR goal change: No      Medication changes affecting anticoagulation: Yes, oxycodone    Additional factors affecting anticoagulation: Yes: pain/inflammation     PLAN     No adjustment to anticoagulation plan needed    Recommended follow up is scheduled  Patient not contacted    No adjustment to Anticoagulation Calendar was required    Lyly Florian RN  1/13/2025  Anticoagulation Clinic  [employer] for routing messages: karime GARCIA  [facility] patient phone line: [phone]    "

## 2025-01-13 NOTE — DISCHARGE INSTRUCTIONS
HOME CARE FOLLOWING INGUINAL/FEMORAL HERNIA REPAIR  PHILLIP Banda, JOSHUA Riley, EKTA Bahena    DIET:  Start with liquids and gradually resume your regular diet as tolerated.  Drink plenty of fluids.  While taking pain medications, consider use of a stool softener, increase your fiber in your diet, or add a fiber supplement (like Metamucil, Citrucel) to help prevent constipation - a possible side effect of pain medications.    NAUSEA:  If nauseated from the anesthetic/pain meds; rest in bed, get up cautiously with assistance, and drink clear liquids (juice, tea, broth).    ACTIVITY:  Light Activity -- you may immediately be up and about as tolerated.  Walking is encouraged, increase as tolerated.  Driving/Light Work-- when comfortable and off narcotic pain medications.  Strenuous Work/Activity -- limit lifting to 25 pounds for 3 weeks.  Active Sports (running, biking, etc.) -- cautiously resume after 2 weeks.    INCISIONAL CARE:  If you have a dressing in place, keep clean and dry for 48 hours; you may replace the gauze if it becomes soiled.  After 48 hours you may remove the dressing and shower.  Do not submerse incision in water for 1 week.  If you have a Dermabond dressing (a type of skin glue), you may shower immediately.  Sutures will absorb and need not be removed.  If present, leave the steri-strips (white paper tapes) in place for 14 days after surgery.  If present, leave Dermabond glue in place until it wears/flakes off.  Do not apply lotions, creams, or ointments to incisions.  Expect a variable amount of swelling/black and blue discoloration that may involve the penis/scrotum or labia.  Some numbness around the incision is common.  A lump/ridge under the incision is normal and will gradually resolve.    DISCOMFORT:  Local anesthetic placed at surgery should provide relief for 4-8 hours.  Begin taking pain pills before discomfort is severe.  Take the pain medication with  some food, when possible, to minimize side effects.  Intermittent use of ice packs to the hernia repair site may help during the first 1-3 weeks after surgery.  Expect gradual improvement.    Over-the-counter anti-inflammatory medications (i.e. Ibuprofen/Advil/Motrin or Naprosyn/Aleve) may be used per package instructions in addition to or while tapering off the narcotic pain medications to decrease swelling and sensitivity at the repair site.  DO NOT TAKE these Anti-inflammatory medications if your primary physician has advised against doing so, or if you have acid reflux, ulcer, or bleeding disorder, or take blood-thinner medications.  Call your primary physician or the surgery office if you have medication questions.    FOLLOW-UP AFTER SURGERY:  -Our office will contact you approximately 2-3 weeks after surgery to check on your progress and answer any questions you may have.  If you are doing well, you will not need to return for an office appointment.  If any concerns are identified over the phone, we will help you make an appointment to see a provider.    -If you have not received a phone call, have any questions or concerns, or would like to be seen, please call us at [phone].  We are located at: [address], [address]; [place] [address]    -CONTACT US IF THE FOLLOWING DEVELOPS:   1. A fever that is above 101     2. Increased redness, warmth, drainage, bleeding, or swelling.   3. Pain that is not relieved by rest/ice and your prescription.   4.  Increasing pain after 48 hours.   5. Drainage that is thick, cloudy, yellow, green or white.   6. Any other questions or concerns.      FREQUENTLY ASKED QUESTIONS:    Q:  How should my incision look?    A:  Normally your incision will appear slightly swollen with light redness directly along the incision itself as it heals.  It may feel like a bump or ridge as the healing/scarring happens, and over time (3-4 months) this bump or ridge feeling should  slowly go away.  In general, clear or pink watery drainage can be normal at first as your incision heals, but should decrease over time.    Q:  How do I know if my incision is infected?  A:  Look at your incision for signs of infection, like redness around the incision spreading to surrounding skin, or drainage of cloudy or foul-smelling drainage.  If you feel warm, check your temperature to see if you are running a fever.    **If any of these things occur, please notify the nurse at our office.  We may need you to come into the office for an incision check.      Q:  How do I take care of my incision?  A:  If you have a dressing in place - Starting the day after surgery, replace the dressing 1-2 times a day until there is no further drainage from the incision.  At that time, a dressing is no longer needed.  Try to minimize tape on the skin if irritation is occurring at the tape sites.  If you have significant irritation from tape on the skin, please call the office to discuss other method of dressing your incision.    Small pieces of tape called  steri-strips  may be present directly overlying your incision; these may be removed 10 days after surgery unless otherwise specified by your surgeon.  If these tapes start to loosen at the ends, you may trim them back until they fall off or are removed.    A:  If you had  Dermabond  tissue glue used as a dressing (this causes your incision to look shiny with a clear covering over it) - This type of dressing wears off with time and does not require more dressings over the top unless it is draining around the glue as it wears off.  Do not apply ointments or lotions over the incisions until the glue has completely worn off.    Q:  There is a piece of tape or a sticky  lead  still on my skin.  Can I remove this?  A:  Sometimes the sticky  leads  used for monitoring during surgery or for evaluation in the emergency department are not all removed while you are in the hospital.   These sometimes have a tab or metal dot on them.  You can easily remove these on your own, like taking off a band-aid.  If there is a gel substance under the  lead , simply wipe/clean it off with a washcloth or paper towel.      Q:  What can I do to minimize constipation (very hard stools, or lack of stools)?  A:  Stay well hydrated.  Increase your dietary fiber intake or take a fiber supplement -with plenty of water.  Walk around frequently.  You may consider an over-the-counter stool-softener.  Your Pharmacist can assist you with choosing one that is stocked at your pharmacy.  Constipation is also one of the most common side effects of pain medication.  If you are using pain medication, be pro-active and try to PREVENT problems with constipation by taking the steps above BEFORE constipation becomes a problem.    Q:  What do I do if I need more pain medications?  A:  Call the office to receive refills.  Be aware that certain pain meds cannot be called into a pharmacy and actually require a paper prescription.  A change may be made in your pain med as you progress thru your recovery period or if you have side effects to certain meds.    --Pain meds are NOT refilled after 5pm on weekdays, and NOT AT ALL on the weekends, so please look ahead to prevent problems.    Q:  Why am I having a hard time sleeping now that I am at home?  A:  Many medications you receive while you are in the hospital can impact your sleep for a number of days after your surgery/hospitalization.  Decreased level of activity and naps during the day may also make sleeping at night difficult.  Try to minimize day-time naps, and get up frequently during the day to walk around your home during your recovery time.  Sleep aides may be of some help, but are not recommended for long-term use.      Q:  I am having some back discomfort.  What should I do?  A:  This may be related to certain positioning that was required for your surgery, extended periods  of time in bed, or other changes in your overall activity level.  You may try ice, heat, acetaminophen, or ibuprofen to treat this temporarily.  Note that many pain medications have acetaminophen in them and would state this on the prescription bottle.  Be sure not to exceed the maximum of 4000mg per day of acetaminophen.     **If the pain you are having does not resolve, is severe, or is a flare of back pain you have had on other occasions prior to surgery, please contact your primary physician for further recommendations or for an appointment to be examined at their office.    Q:  Why am I having headaches?  A:  Headaches can be caused by many things:  caffeine withdrawal, use of pain meds, dehydration, high blood pressure, lack of sleep, over-activity/exhaustion, flare-up of usual migraine headaches.  If you feel this is related to muscle tension (a band-like feeling around the head, or a pressure at the low-back of the head) you may try ice or heat to this area.  You may need to drink more fluids (try electrolyte drink like Gatorade), rest, or take your usual migraine medications.   **If your headaches do not resolve, worsen, are accompanied by other symptoms, or if your blood pressure is high, please call your primary physician for recommendation and/or examination.    Q:  I am unable to urinate.  What do I do?  A:  A small percentage of people can have difficulty urinating initially after surgery.  This includes being able to urinate only a very small amount at a time and feeling discomfort or pressure in the very low abdomen.  This is called  urinary retention , and is actually an urgent situation.  Proceed to your nearest Emergency department for evaluation (not an Urgent Care Center).  Sometimes the bladder does not work correctly after certain medications you receive during surgery, or related to certain procedures.  You may need to have a catheter placed until your bladder recovers.  When planning to go  to an Emergency department, it may help to call the ER to let them know you are coming in for this problem after a surgery.  This may help you get in quicker to be evaluated.  **If you have symptoms of a urinary tract infection, please contact your primary physician for the proper evaluation and treatment.        If you have other questions, please call the office Monday thru Friday between 8am and 4:30pm to discuss with the nurse or physician assistant.  #[phone]    There is a surgeon ON CALL on weekday evenings and over the weekend in case of urgent need only, and may be contacted at the same number.    If you are having an emergency, call 911 or proceed to your nearest emergency department.    You received Toradol, an IV form of Ibuprofen (Motrin) at 11:32 AM.  Do not take any Ibuprofen products until 5:32 PM.    Maximum acetaminophen (Tylenol) dose from all sources should not exceed 4 grams (4000 mg) per day.      Dr. Martinez   [facility] [phone]

## 2025-01-13 NOTE — OP NOTE
General Surgery Operative Note    Pre-operative diagnosis:  Non-recurrent bilateral inguinal hernia without obstruction or gangrene [K40.20]   Post-operative diagnosis: same   Procedure: Robotic assisted bilateral inguinal hernia repair with mesh   Surgeon: Emanuel Martinez MD   Assistant(s): Elizabeth Messer PA-C - the physician assistant was medically necessary to assist in prepping, positioning, camera operation, retraction/exposure and instrument exchange.   Anesthesia: General    Estimated blood loss: 5 cc's   Drains placed: None   Complications:  None   Findings:  Bilateral indirect inguinal hernias, repaired using preperitoneal ProGrip mesh.     Indications for operation: This is a 59-year-old gentleman who presented with bilateral inguinal hernias.  Robotic assisted repair was recommended and the procedure, along with its risks and complications, was discussed with the patient.  He agreed to proceed.     Details of the operation: After informed consent, the patient was taken to the operating room, where he underwent satisfactory induction of general anesthesia.  The patient was sterilely prepped and draped and a supraumbilical skin incision was made.  Dissection was carried bluntly down to the fascia, which was opened very slightly using electrocautery.  The robotic camera port was inserted and pneumoperitoneum was achieved using CO2 insufflation.  Under direct visualization, an 8 mm robotic port was now placed on each side of the abdomen.  The robot was brought in and docked without difficulty.  No prominent indirect hernias on each side.  The right side was larger.  We approached the right side first.  The peritoneum was scored above the level of the ASIS and the preperitoneal space was then developed.  The large hernia sac was reduced.  The preperitoneal space was completed and, once this was done, a piece of ProGrip mesh was brought into the field.  This was trimmed appropriately on the corners and  placed into the preperitoneal space.  The mesh was deployed so that it lay smoothly.       We now turned to the left side.  Here, the peritoneum was again scored and the preperitoneal space developed.  We made the preperitoneal space and reduce the hernia sac.  A piece of ProGrip mesh was brought onto the field and deployed in the preperitoneal space..  The 2 pieces of mesh overlapped in the midline.  There was excellent coverage of all hernia defects.  The peritoneum was now closed on each side using a running 3-0 STRATAFIX suture.  A couple of small openings in the peritoneum were closed using 3-0 Vicryl suture.  The robot was now undocked and the pneumoperitoneum was released.  The supraumbilical fascia was closed using interrupted 0 Vicryl sutures.  Skin incisions were closed using 4-0 subcuticular Vicryl, followed by Steri-Strips.     The patient tolerated the procedure well and was transferred to the recovery room in satisfactory condition.  Sponge and needle counts were correct at the close of the case    Specimens: * No specimens in log *        Emanuel Martinez MD

## 2025-01-13 NOTE — ANESTHESIA CARE TRANSFER NOTE
Patient: Schuyler Castro    Procedure: Procedure(s):  Robotic assisted bilateral inguinal hernia repair with mesh       Diagnosis: Non-recurrent bilateral inguinal hernia without obstruction or gangrene [K40.20]  Diagnosis Additional Information: No value filed.    Anesthesia Type:   General     Note:    Oropharynx: oral airway in place  Level of Consciousness: drowsy  Oxygen Supplementation: face mask  Level of Supplemental Oxygen (L/min / FiO2): 6 lpm  Independent Airway: airway patency satisfactory and stable  Dentition: dentition unchanged  Vital Signs Stable: post-procedure vital signs reviewed and stable  Report to RN Given: handoff report given  Patient transferred to: PACU  Comments: Patient oral suctioned. Patient with spontaneous respirations and adequate tidal volumes. Patient awake and responsive. Extubated in OR to 6L facemask. To PACU ventilating well. VSS. Report given.  Handoff Report: Identifed the Patient, Identified the Reponsible Provider, Reviewed the pertinent medical history, Discussed the surgical course, Reviewed Intra-OP anesthesia mangement and issues during anesthesia, Set expectations for post-procedure period and Allowed opportunity for questions and acknowledgement of understanding      Vitals:  Vitals Value Taken Time   /71 01/13/25 1150   Temp 97.7 01/13/25  1150   Pulse 73 01/13/25 1151   Resp 12 01/13/25 1151   SpO2 100 % 01/13/25 1151   Vitals shown include unfiled device data.    Electronically Signed By: CHIQUI Schumacher CRNA  January 13, 2025  11:52 AM

## 2025-01-20 ENCOUNTER — DOCUMENTATION ONLY (OUTPATIENT)
Dept: ANTICOAGULATION | Facility: CLINIC | Age: 60
End: 2025-01-20

## 2025-01-20 ENCOUNTER — LAB (OUTPATIENT)
Dept: LAB | Facility: CLINIC | Age: 60
End: 2025-01-20
Payer: COMMERCIAL

## 2025-01-20 ENCOUNTER — ANTICOAGULATION THERAPY VISIT (OUTPATIENT)
Dept: ANTICOAGULATION | Facility: CLINIC | Age: 60
End: 2025-01-20

## 2025-01-20 DIAGNOSIS — Z86.718 PERSONAL HISTORY OF DVT (DEEP VEIN THROMBOSIS): ICD-10-CM

## 2025-01-20 DIAGNOSIS — Z86.711 HISTORY OF PULMONARY EMBOLISM: Primary | ICD-10-CM

## 2025-01-20 DIAGNOSIS — Z86.711 HISTORY OF PULMONARY EMBOLISM: ICD-10-CM

## 2025-01-20 DIAGNOSIS — Z79.01 LONG TERM CURRENT USE OF ANTICOAGULANTS WITH INR GOAL OF 2.0-3.0: ICD-10-CM

## 2025-01-20 LAB — INR BLD: 2.1 (ref 0.9–1.1)

## 2025-01-20 PROCEDURE — 85610 PROTHROMBIN TIME: CPT

## 2025-01-20 PROCEDURE — 36416 COLLJ CAPILLARY BLOOD SPEC: CPT

## 2025-01-20 NOTE — PROGRESS NOTES
ANTICOAGULATION CLINIC REFERRAL RENEWAL REQUEST       An annual renewal order is required for all patients referred to [facility].?  Please review and sign the pended referral order for Schuyler Castro.       ANTICOAGULATION SUMMARY      Warfarin indication(s)   DVT and PE    Mechanical heart valve present?  NO       Current goal range   INR: 2.0-3.0     Goal appropriate for indication? Goal INR 2-3, standard for indication(s) above     Time in Therapeutic Range (TTR)  (Goal > 60%) 100%       Office visit with referring provider's group within last year yes on 1/2/2025       Lyly Florian RN  [facility]    
C/w Xanax 0.5mg Q 8h PRN, Remeron 15mg QHS.

## 2025-01-20 NOTE — PROGRESS NOTES
ANTICOAGULATION MANAGEMENT     Schuyler Castro 59 year old male is on warfarin with therapeutic INR result. (Goal INR 2.0-3.0)    Recent labs: (last 7 days)     01/20/25  0723   INR 2.1*         ASSESSMENT     Source(s): Chart Review and Patient/Caregiver Call     Warfarin doses taken: Booster dose(s) recently taken which may be affecting INR and Held for 5 days  recently which may be affecting INR  Diet: No new diet changes identified  Medication/supplement changes:  patient reports taking tylenol 2-4 times per day; also took ibuprofen x 2 but has now discontinued.  New illness, injury, or hospitalization: Yes: recovering from hernia surgery, patient reports that he is feeling well.  Signs or symptoms of bleeding or clotting: No  Previous result: Therapeutic last 2(+) visits  Additional findings: None       PLAN     Recommended plan for temporary change(s) affecting INR     Dosing Instructions: Continue your current warfarin dose with next INR in 4 weeks       Summary  As of 1/20/2025      Full warfarin instructions:  5 mg every Tue, Sat; 7.5 mg all other days   Next INR check:  2/20/2025               Telephone call with David who verbalizes understanding and agrees to plan    Lab visit scheduled    Education provided: Please call back if any changes to your diet, medications or how you've been taking warfarin  Taking warfarin: Importance of taking warfarin as instructed  Importance of avoiding ibuprofen    Plan made per [facility] anticoagulation protocol    Lyly Florian RN  1/20/2025  Anticoagulation Clinic  [employer] for routing messages: karime GARCIA  [facility] patient phone line: [phone]        _______________________________________________________________________     Anticoagulation Episode Summary       Current INR goal:  2.0-3.0   TTR:  100.0% (1 y)   Target end date:  Indefinite   Send INR reminders to:  CASEY GARCIA    Indications    Personal history of DVT (deep vein thrombosis) (Resolved)  [Z86.718]  History of pulmonary embolism [Z86.711]  Long term current use of anticoagulants with INR goal of 2.0-3.0 [Z79.01]  Personal history of DVT (deep vein thrombosis) [Z86.718]             Comments:  6/7/21-OK for 12 week INR monitoring             Anticoagulation Care Providers       Provider Role Specialty Phone number    Rasheeda Juan MD Referring Family Medicine [phone]    Jess Kauffman APRN CNP Referring Family Medicine [phone]

## 2025-02-04 ENCOUNTER — TELEPHONE (OUTPATIENT)
Dept: SURGERY | Facility: CLINIC | Age: 60
End: 2025-02-04
Payer: COMMERCIAL

## 2025-02-04 NOTE — CONFIDENTIAL NOTE
[facility]  Post op call note     Schuyler Castro was called for an update regarding his recovery.  He underwent robotic-assisted laparoscopic repair of bilateral inguinal hernias by Dr. Martinez on 1/13/2025. Today he tells me he is doing well and denies any complaints. He is eating a normal diet and his bowels are regular. He states his wounds are healing well and he has returned to work without issues.    He was instructed to slowly and gradually resume all normal activities. He states all of his questions were answered.  He understands our discussion.  He agrees to follow up as needed or to call our office with any concerns.    Elizabeth Messer PA-C

## 2025-02-20 ENCOUNTER — LAB (OUTPATIENT)
Dept: LAB | Facility: CLINIC | Age: 60
End: 2025-02-20
Payer: COMMERCIAL

## 2025-02-20 ENCOUNTER — ANTICOAGULATION THERAPY VISIT (OUTPATIENT)
Dept: ANTICOAGULATION | Facility: CLINIC | Age: 60
End: 2025-02-20

## 2025-02-20 DIAGNOSIS — Z86.718 PERSONAL HISTORY OF DVT (DEEP VEIN THROMBOSIS): ICD-10-CM

## 2025-02-20 DIAGNOSIS — Z79.01 LONG TERM CURRENT USE OF ANTICOAGULANTS WITH INR GOAL OF 2.0-3.0: ICD-10-CM

## 2025-02-20 DIAGNOSIS — Z86.711 HISTORY OF PULMONARY EMBOLISM: ICD-10-CM

## 2025-02-20 DIAGNOSIS — Z86.711 HISTORY OF PULMONARY EMBOLISM: Primary | ICD-10-CM

## 2025-02-20 LAB — INR BLD: 2 (ref 0.9–1.1)

## 2025-02-20 NOTE — PROGRESS NOTES
ANTICOAGULATION MANAGEMENT     Schuyler Castro 59 year old male is on warfarin with therapeutic INR result. (Goal INR 2.0-3.0)    Recent labs: (last 7 days)     02/20/25  0732   INR 2.0*       ASSESSMENT     Source(s): Chart Review and Patient/Caregiver Call     Warfarin doses taken: Warfarin taken as instructed  Diet: No new diet changes identified  Medication/supplement changes: None noted  New illness, injury, or hospitalization: No  Signs or symptoms of bleeding or clotting: No  Previous result: Therapeutic last 2(+) visits  Additional findings: None       PLAN     Recommended plan for no diet, medication or health factor changes affecting INR     Dosing Instructions: Continue your current warfarin dose with next INR in 6 weeks       Summary  As of 2/20/2025      Full warfarin instructions:  5 mg every Tue, Sat; 7.5 mg all other days   Next INR check:  4/10/2025               Telephone call with David who verbalizes understanding and agrees to plan and who agrees to plan and repeated back plan correctly    Patient elected to schedule next visit in 7 weeks    Education provided: Importance of notifying anticoagulation clinic for: changes in medications; a sooner lab recheck maybe needed and diarrhea, nausea/vomiting, reduced intake, cold/flu, and/or infections; a sooner lab recheck maybe needed  Contact [phone] with any changes, questions or concerns.     Plan made per [facility] anticoagulation protocol    Adrianna Smith RN  2/20/2025  Anticoagulation Clinic  [employer] for routing messages: karime GARCIA  [facility] patient phone line: [phone]        _______________________________________________________________________     Anticoagulation Episode Summary       Current INR goal:  2.0-3.0   TTR:  100.0% (1 y)   Target end date:  Indefinite   Send INR reminders to:  CASEY GARCIA    Indications    Personal history of DVT (deep vein thrombosis) (Resolved) [Z86.272]  History of pulmonary embolism  [Z86.711]  Long term current use of anticoagulants with INR goal of 2.0-3.0 [Z79.01]  Personal history of DVT (deep vein thrombosis) [Z86.718]             Comments:  6/7/21-OK for 12 week INR monitoring             Anticoagulation Care Providers       Provider Role Specialty Phone number    Rasheeda Juan MD Referring Family Medicine [phone]    Jess Kauffman APRN CNP Referring Family Medicine [phone]

## 2025-03-19 ENCOUNTER — OFFICE VISIT (OUTPATIENT)
Dept: FAMILY MEDICINE | Facility: CLINIC | Age: 60
End: 2025-03-19
Payer: COMMERCIAL

## 2025-03-19 VITALS
RESPIRATION RATE: 20 BRPM | WEIGHT: 187.5 LBS | SYSTOLIC BLOOD PRESSURE: 150 MMHG | OXYGEN SATURATION: 98 % | HEART RATE: 83 BPM | BODY MASS INDEX: 26.84 KG/M2 | HEIGHT: 70 IN | DIASTOLIC BLOOD PRESSURE: 82 MMHG | TEMPERATURE: 97.5 F

## 2025-03-19 DIAGNOSIS — E11.65 TYPE 2 DIABETES MELLITUS WITH HYPERGLYCEMIA, WITHOUT LONG-TERM CURRENT USE OF INSULIN (H): ICD-10-CM

## 2025-03-19 DIAGNOSIS — E78.5 HYPERLIPIDEMIA LDL GOAL <100: ICD-10-CM

## 2025-03-19 DIAGNOSIS — Z12.5 SCREENING FOR PROSTATE CANCER: ICD-10-CM

## 2025-03-19 DIAGNOSIS — I10 HYPERTENSION GOAL BP (BLOOD PRESSURE) < 140/90: ICD-10-CM

## 2025-03-19 DIAGNOSIS — Z12.11 SCREEN FOR COLON CANCER: ICD-10-CM

## 2025-03-19 DIAGNOSIS — Z00.00 ROUTINE GENERAL MEDICAL EXAMINATION AT A HEALTH CARE FACILITY: Primary | ICD-10-CM

## 2025-03-19 PROBLEM — Z29.9 ENCOUNTER FOR PREVENTIVE MEASURE: Status: RESOLVED | Noted: 2021-08-18 | Resolved: 2025-03-19

## 2025-03-19 PROCEDURE — 3079F DIAST BP 80-89 MM HG: CPT | Performed by: NURSE PRACTITIONER

## 2025-03-19 PROCEDURE — 99214 OFFICE O/P EST MOD 30 MIN: CPT | Mod: 25 | Performed by: NURSE PRACTITIONER

## 2025-03-19 PROCEDURE — G2211 COMPLEX E/M VISIT ADD ON: HCPCS | Performed by: NURSE PRACTITIONER

## 2025-03-19 PROCEDURE — 1126F AMNT PAIN NOTED NONE PRSNT: CPT | Performed by: NURSE PRACTITIONER

## 2025-03-19 PROCEDURE — 3077F SYST BP >= 140 MM HG: CPT | Performed by: NURSE PRACTITIONER

## 2025-03-19 PROCEDURE — 99396 PREV VISIT EST AGE 40-64: CPT | Mod: 24 | Performed by: NURSE PRACTITIONER

## 2025-03-19 RX ORDER — ROSUVASTATIN CALCIUM 20 MG/1
20 TABLET, COATED ORAL DAILY
Qty: 90 TABLET | Refills: 1 | Status: SHIPPED | OUTPATIENT
Start: 2025-03-19

## 2025-03-19 RX ORDER — ATENOLOL 50 MG/1
50 TABLET ORAL DAILY
Qty: 90 TABLET | Refills: 3 | Status: SHIPPED | OUTPATIENT
Start: 2025-03-19

## 2025-03-19 RX ORDER — ORAL SEMAGLUTIDE 14 MG/1
14 TABLET ORAL DAILY
Qty: 90 TABLET | Refills: 1 | Status: SHIPPED | OUTPATIENT
Start: 2025-03-19

## 2025-03-19 RX ORDER — TAMSULOSIN HYDROCHLORIDE 0.4 MG/1
0.4 CAPSULE ORAL DAILY
Qty: 7 CAPSULE | Refills: 0 | Status: CANCELLED | OUTPATIENT
Start: 2025-03-19

## 2025-03-19 RX ORDER — LISINOPRIL 20 MG/1
20 TABLET ORAL DAILY
Qty: 90 TABLET | Refills: 3 | Status: SHIPPED | OUTPATIENT
Start: 2025-03-19

## 2025-03-19 ASSESSMENT — PAIN SCALES - GENERAL: PAINLEVEL_OUTOF10: NO PAIN (0)

## 2025-03-19 NOTE — PATIENT INSTRUCTIONS
Shingles and Tdap vaccine    Schedule eye exam and colonoscopy   Patient Education   Preventive Care Advice   This is general advice given by our system to help you stay healthy. However, your care team may have specific advice just for you. Please talk to your care team about your preventive care needs.  Nutrition  Eat 5 or more servings of fruits and vegetables each day.  Try wheat bread, brown rice and whole grain pasta (instead of white bread, rice, and pasta).  Get enough calcium and vitamin D. Check the label on foods and aim for 100% of the RDA (recommended daily allowance).  Lifestyle  Exercise at least 150 minutes each week  (30 minutes a day, 5 days a week).  Do muscle strengthening activities 2 days a week. These help control your weight and prevent disease.  No smoking.  Wear sunscreen to prevent skin cancer.  Have a dental exam and cleaning every 6 months.  Yearly exams  See your health care team every year to talk about:  Any changes in your health.  Any medicines your care team has prescribed.  Preventive care, family planning, and ways to prevent chronic diseases.  Shots (vaccines)   HPV shots (up to age 26), if you've never had them before.  Hepatitis B shots (up to age 59), if you've never had them before.  COVID-19 shot: Get this shot when it's due.  Flu shot: Get a flu shot every year.  Tetanus shot: Get a tetanus shot every 10 years.  Pneumococcal, hepatitis A, and RSV shots: Ask your care team if you need these based on your risk.  Shingles shot (for age 50 and up)  General health tests  Diabetes screening:  Starting at age 35, Get screened for diabetes at least every 3 years.  If you are younger than age 35, ask your care team if you should be screened for diabetes.  Cholesterol test: At age 39, start having a cholesterol test every 5 years, or more often if advised.  Bone density scan (DEXA): At age 50, ask your care team if you should have this scan for osteoporosis (brittle  bones).  Hepatitis C: Get tested at least once in your life.  STIs (sexually transmitted infections)  Before age 24: Ask your care team if you should be screened for STIs.  After age 24: Get screened for STIs if you're at risk. You are at risk for STIs (including HIV) if:  You are sexually active with more than one person.  You don't use condoms every time.  You or a partner was diagnosed with a sexually transmitted infection.  If you are at risk for HIV, ask about PrEP medicine to prevent HIV.  Get tested for HIV at least once in your life, whether you are at risk for HIV or not.  Cancer screening tests  Cervical cancer screening: If you have a cervix, begin getting regular cervical cancer screening tests starting at age 21.  Breast cancer scan (mammogram): If you've ever had breasts, begin having regular mammograms starting at age 40. This is a scan to check for breast cancer.  Colon cancer screening: It is important to start screening for colon cancer at age 45.  Have a colonoscopy test every 10 years (or more often if you're at risk) Or, ask your provider about stool tests like a FIT test every year or Cologuard test every 3 years.  To learn more about your testing options, visit:   .  For help making a decision, visit:   https://bit.ly/kt32576.  Prostate cancer screening test: If you have a prostate, ask your care team if a prostate cancer screening test (PSA) at age 55 is right for you.  Lung cancer screening: If you are a current or former smoker ages 50 to 80, ask your care team if ongoing lung cancer screenings are right for you.  For informational purposes only. Not to replace the advice of your health care provider. Copyright   2023 [employer]. All rights reserved. Clinically reviewed by the [facility]. [employer] 815759 - REV 01/24.

## 2025-03-19 NOTE — PROGRESS NOTES
Preventive Care Visit  [facility] CHIQUI Light CNP, Family Medicine  Mar 19, 2025      Assessment & Plan     Routine general medical examination at a health care facility  Reviewed age appropriate screenings and immunizations.  Declines vaccines.  Due for colonoscopy; aware he needs to schedule.     Hypertension goal BP (blood pressure) < 140/90  Chronic, elevated here in clinic today.  His home readings have been well controlled.  Continue on current medication.  Continue to check BP at home and let me know if >140/90.  - atenolol (TENORMIN) 50 MG tablet; Take 1 tablet (50 mg) by mouth daily.  - lisinopril (ZESTRIL) 20 MG tablet; Take 1 tablet (20 mg) by mouth daily. (Replaces lisinopril/hydrochlorothiazide)    Type 2 diabetes mellitus with hyperglycemia, without long-term current use of insulin (H)  Chronic, has been well controlled.  Continue on current medications.  Due for eye exam; you will need to schedule.  - blood glucose (NO BRAND SPECIFIED) lancets standard; Use to test blood sugar 2-3 times daily or as directed.  - blood glucose (NO BRAND SPECIFIED) test strip; Use to test blood sugar 2-3 times daily or as directed.  - empagliflozin (JARDIANCE) 10 MG TABS tablet; Take 1 tablet (10 mg) by mouth daily.  - metFORMIN (GLUCOPHAGE) 500 MG tablet; Take 2 tablets (1,000 mg) by mouth 2 times daily (with meals).  - Semaglutide (RYBELSUS) 14 MG tablet; Take 14 mg by mouth daily.  - FOOT EXAM    Hyperlipidemia LDL goal <100  Chronic, controlled.  Continue statin.  - rosuvastatin (CRESTOR) 20 MG tablet; Take 1 tablet (20 mg) by mouth daily.    Screen for colon cancer    - Colonoscopy Screening  Referral; Future    Screening for prostate cancer  screen  - PSA, screen; Future    The longitudinal plan of care for the diagnosis(es)/condition(s) as documented were addressed during this visit. Due to the added complexity in care, I will continue to support David in the  "subsequent management and with ongoing continuity of care.        BMI  Estimated body mass index is 26.71 kg/m  as calculated from the following:    Height as of this encounter: 1.784 m (5' 10.25\").    Weight as of this encounter: 85 kg (187 lb 8 oz).   Weight management plan: Discussed healthy diet and exercise guidelines      Follow-up 6 mos diabetes (with me or MTM)    Adrienne Hernandez is a 59 year old, presenting for the following:  Physical and Blood Draw (Fasting labs)        3/19/2025     8:13 AM   Additional Questions   Roomed by Lisa Magill, CMA   Accompanied by self         3/19/2025     8:13 AM   Patient Reported Additional Medications   Patient reports taking the following new medications NONE          HPI       Diabetes Follow-up    How often are you checking your blood sugar? sporadic  What concerns do you have today about your diabetes? None   Do you have any of these symptoms? (Select all that apply)  No numbness or tingling in feet.  No redness, sores or blisters on feet.  No complaints of excessive thirst.  No reports of blurry vision.  No significant changes to weight.  Have you had a diabetic eye exam in the last 12 months? No  Follows with MTM.           Hyperlipidemia Follow-Up    Are you regularly taking any medication or supplement to lower your cholesterol?   Yes- crestor  Are you having muscle aches or other side effects that you think could be caused by your cholesterol lowering medication?  No    Hypertension Follow-up    Do you check your blood pressure regularly outside of the clinic? Yes   Are you following a low salt diet? Yes  Are your blood pressures ever more than 140 on the top number (systolic) OR more   than 90 on the bottom number (diastolic), for example 140/90? No  Home readings 120s/70s.   Denies CP, SOB, edema, and palpitations.   BP Readings from Last 2 Encounters:   03/19/25 (!) 164/86   01/13/25 138/77     Hemoglobin A1C (%)   Date Value   01/02/2025 6.3 (H) "   07/22/2024 6.1 (H)   04/24/2019 9.5 (H)   05/24/2018 8.9 (H)     LDL Cholesterol Calculated (mg/dL)   Date Value   07/22/2024 35   05/19/2023 47   04/24/2019 80   05/24/2018 83         Advance Care Planning  Patient does not have a Health Care Directive: Patient states has Advance Directive and will bring in a copy to clinic.       No data to display                  1/23/2023   Nutrition   Three or more servings of calcium each day? Yes   Diet: diabetic    carbohydrate counting       Multiple values from one day are sorted in reverse-chronological order         1/23/2023   Exercise   Frequency of exercise: 4-5 days/week         2/5/2024   Social Factors   Worry food won't last until get money to buy more No   Food not last or not have enough money for food? No   Do you have housing? (Housing is defined as stable permanent housing and does not include staying ouside in a car, in a tent, in an abandoned building, in an overnight shelter, or couch-surfing.) Yes   Are you worried about losing your housing? No   Lack of transportation? No   Unable to get utilities (heat,electricity)? No         1/23/2023   Dental   Dentist two times every year? No           Today's PHQ-2 Score:       3/19/2025     8:25 AM   PHQ-2 ( 1999 [employer])   Q1: Little interest or pleasure in doing things 0   Q2: Feeling down, depressed or hopeless 0   PHQ-2 Score 0           1/23/2023   Substance Use   Alcohol more than 3/day or more than 7/wk Not Applicable     Social History     Tobacco Use    Smoking status: Never     Passive exposure: Never    Smokeless tobacco: Never   Vaping Use    Vaping status: Never Used   Substance Use Topics    Alcohol use: Not Currently     Alcohol/week: 0.0 - 1.0 standard drinks of alcohol    Drug use: No       Last PSA:   PSA   Date Value Ref Range Status   08/24/2010 0.92 0 - 4 ug/L Final     ASCVD Risk   The ASCVD Risk score (Steve HOOVER, et al., 2019) failed to calculate for the following reasons:    The  valid total cholesterol range is 130 to 320 mg/dL           Reviewed and updated as needed this visit by Provider                    Past Medical History:   Diagnosis Date    Antiplatelet or antithrombotic long-term use     Diabetes mellitus type 2, noninsulin dependent (H) 2002    DVT of deep femoral vein (H) 08/2010    Hypertension 1996    Pulmonary embolus (H) 08/2010    Type 2 diabetes mellitus with hyperglycemia (H) 10/25/2015     Past Surgical History:   Procedure Laterality Date    COLONOSCOPY N/A 5/25/2023    Procedure: Colonoscopy;  Surgeon: Nina Menjivar MD;  Location:  GI    DAVINCI XI HERNIORRHAPHY INGUINAL Bilateral 1/13/2025    Procedure: Robotic assisted bilateral inguinal hernia repair with mesh;  Surgeon: Emanuel Martinez MD;  Location:  OR     Labs reviewed in EPIC  BP Readings from Last 3 Encounters:   03/19/25 (!) 164/86   01/13/25 138/77   01/02/25 130/80    Wt Readings from Last 3 Encounters:   03/19/25 85 kg (187 lb 8 oz)   01/13/25 83.8 kg (184 lb 11.2 oz)   01/02/25 84.4 kg (186 lb)                  Current Outpatient Medications   Medication Sig Dispense Refill    Acetaminophen (TYLENOL PO) Take 650 mg by mouth      atenolol (TENORMIN) 50 MG tablet Take 1 tablet (50 mg) by mouth daily. 90 tablet 0    blood glucose (NO BRAND SPECIFIED) lancets standard Use to test blood sugar 2-3 times daily or as directed. 90 each 11    blood glucose (NO BRAND SPECIFIED) test strip Use to test blood sugar 2-3 times daily or as directed. 100 strip 11    blood glucose monitoring (NO BRAND SPECIFIED) meter device kit Use to test blood sugar 2-3 times daily or as directed. 1 kit 0    empagliflozin (JARDIANCE) 10 MG TABS tablet Take 1 tablet (10 mg) by mouth daily. 90 tablet 0    lisinopril (ZESTRIL) 20 MG tablet Take 1 tablet (20 mg) by mouth daily. (Replaces lisinopril/hydrochlorothiazide) 90 tablet 0    metFORMIN (GLUCOPHAGE) 500 MG tablet Take 2 tablets (1,000 mg) by mouth 2 times daily (with  "meals). 360 tablet 0    rosuvastatin (CRESTOR) 20 MG tablet Take 1 tablet (20 mg) by mouth daily. 90 tablet 0    Semaglutide (RYBELSUS) 14 MG tablet Take 14 mg by mouth daily. 90 tablet 0    warfarin ANTICOAGULANT (COUMADIN) 5 MG tablet TAKE 5 MG BY MOUTH EVERY TUE & SAT + 7.5 MG REST OF WEEK OR PER INR CLINIC 124 tablet 1     No Known Allergies         Objective    Exam  BP (!) 164/86 (BP Location: Right arm, Patient Position: Sitting, Cuff Size: Adult Regular)   Pulse 83   Temp 97.5  F (36.4  C) (Oral)   Resp 20   Ht 1.784 m (5' 10.25\")   Wt 85 kg (187 lb 8 oz)   SpO2 98%   BMI 26.71 kg/m     Estimated body mass index is 26.71 kg/m  as calculated from the following:    Height as of this encounter: 1.784 m (5' 10.25\").    Weight as of this encounter: 85 kg (187 lb 8 oz).    Physical Exam  GENERAL: alert and no distress  EYES: Eyes grossly normal to inspection, PERRL and conjunctivae and sclerae normal  HENT: ear canals and TM's normal, nose and mouth without ulcers or lesions  NECK: no adenopathy, no asymmetry, masses, or scars  RESP: lungs clear to auscultation - no rales, rhonchi or wheezes  CV: regular rate and rhythm, normal S1 S2, no S3 or S4, no murmur, click or rub, no peripheral edema  ABDOMEN: soft, nontender, no hepatosplenomegaly, no masses and bowel sounds normal  MS: no gross musculoskeletal defects noted, no edema  SKIN: no suspicious lesions or rashes  NEURO: Normal strength and tone, mentation intact and speech normal  PSYCH: mentation appears normal, affect normal/bright  Diabetic foot exam: normal DP and PT pulses, no trophic changes or ulcerative lesions, normal sensory exam, and normal monofilament exam        Signed Electronically by: CHIQUI Mike CNP    "

## 2025-03-19 NOTE — NURSING NOTE
"Chief Complaint   Patient presents with    Physical    Blood Draw     Fasting labs     Initial BP (!) 164/86 (BP Location: Right arm, Patient Position: Sitting, Cuff Size: Adult Regular)   Pulse 83   Temp 97.5  F (36.4  C) (Oral)   Resp 20   Ht 1.784 m (5' 10.25\")   Wt 85 kg (187 lb 8 oz)   SpO2 98%   BMI 26.71 kg/m   Estimated body mass index is 26.71 kg/m  as calculated from the following:    Height as of this encounter: 1.784 m (5' 10.25\").    Weight as of this encounter: 85 kg (187 lb 8 oz).  BP completed using cuff size regular right arm    Lisa Magill, CMA    "

## 2025-04-10 ENCOUNTER — LAB (OUTPATIENT)
Dept: LAB | Facility: CLINIC | Age: 60
End: 2025-04-10
Payer: COMMERCIAL

## 2025-04-10 ENCOUNTER — ANTICOAGULATION THERAPY VISIT (OUTPATIENT)
Dept: ANTICOAGULATION | Facility: CLINIC | Age: 60
End: 2025-04-10

## 2025-04-10 DIAGNOSIS — Z79.01 LONG TERM CURRENT USE OF ANTICOAGULANTS WITH INR GOAL OF 2.0-3.0: ICD-10-CM

## 2025-04-10 DIAGNOSIS — Z86.711 HISTORY OF PULMONARY EMBOLISM: ICD-10-CM

## 2025-04-10 DIAGNOSIS — Z86.711 HISTORY OF PULMONARY EMBOLISM: Primary | ICD-10-CM

## 2025-04-10 DIAGNOSIS — Z86.718 PERSONAL HISTORY OF DVT (DEEP VEIN THROMBOSIS): ICD-10-CM

## 2025-04-10 LAB — INR BLD: 2 (ref 0.9–1.1)

## 2025-04-10 RX ORDER — WARFARIN SODIUM 5 MG/1
TABLET ORAL
Qty: 124 TABLET | Refills: 1 | Status: SHIPPED | OUTPATIENT
Start: 2025-04-10

## 2025-04-10 NOTE — PROGRESS NOTES
ANTICOAGULATION MANAGEMENT     Schuyler Castro 59 year old male is on warfarin with therapeutic INR result. (Goal INR 2.0-3.0)    Recent labs: (last 7 days)     04/10/25  0725   INR 2.0*       ASSESSMENT     Source(s): Chart Review  Previous INR was Therapeutic last 2(+) visits  Medication, diet, health changes since last INR chart reviewed; none identified         PLAN     Recommended plan for no diet, medication or health factor changes affecting INR     Dosing Instructions: Continue your current warfarin dose with next INR in 6 weeks       Summary  As of 4/10/2025      Full warfarin instructions:  5 mg every Tue, Sat; 7.5 mg all other days   Next INR check:  5/22/2025               Detailed voice message left for David with dosing instructions and follow up date.   Sent [employer] message with dosing and follow up instructions    Contact [phone] to schedule and with any changes, questions or concerns.     Education provided: Please call back if any changes to your diet, medications or how you've been taking warfarin    Plan made per [facility] anticoagulation protocol    Violeta Sanchez RN  4/10/2025  Anticoagulation Clinic  [employer] for routing messages: karime GARCIA  [facility] patient phone line: [phone]        _______________________________________________________________________     Anticoagulation Episode Summary       Current INR goal:  2.0-3.0   TTR:  100.0% (1 y)   Target end date:  Indefinite   Send INR reminders to:  CASEY GARCIA    Indications    Personal history of DVT (deep vein thrombosis) (Resolved) [Z86.718]  History of pulmonary embolism [Z86.711]  Long term current use of anticoagulants with INR goal of 2.0-3.0 [Z79.01]  Personal history of DVT (deep vein thrombosis) [Z86.718]             Comments:  6/7/21-OK for 12 week INR monitoring             Anticoagulation Care Providers       Provider Role Specialty Phone number    Rasheeda Juan MD Referring Family Medicine [phone]    Strong,  CHIQUI Mercado [facility] Family Medicine [phone]

## 2025-04-10 NOTE — TELEPHONE ENCOUNTER
Patient call to request refill of warfarin.    ANTICOAGULATION MANAGEMENT:  Medication Refill    Anticoagulation Summary  As of 4/10/2025      Warfarin maintenance plan:  5 mg (5 mg x 1) every Tue, Sat; 7.5 mg (5 mg x 1.5) all other days   Next INR check:  5/22/2025   Target end date:  Indefinite    Indications    Personal history of DVT (deep vein thrombosis) (Resolved) [Z86.718]  History of pulmonary embolism [Z86.711]  Long term current use of anticoagulants with INR goal of 2.0-3.0 [Z79.01]  Personal history of DVT (deep vein thrombosis) [Z86.718]                 Anticoagulation Care Providers       Provider Role Specialty Phone number    Rasheeda Juan MD Referring Family Medicine [phone]    Jess Kauffman APRN CNP Referring Family Medicine [phone]            Refill Criteria    Visit with referring provider/group: Meets criteria: visit within referring provider group in the last 15 months on 3/19/25    [facility] referral last signed: 01/20/2025; within last year:  Yes    Lab monitoring is up to date (not exceeding 2 weeks overdue): Yes    Schuyler meets all criteria for refill. Rx instructions and quantity match patient's current dosing plan. Warfarin 90 day supply with 1 refill granted per [facility] protocol     Violeta Sanchez RN  Anticoagulation Clinic

## 2025-05-21 ENCOUNTER — ANTICOAGULATION THERAPY VISIT (OUTPATIENT)
Dept: ANTICOAGULATION | Facility: CLINIC | Age: 60
End: 2025-05-21

## 2025-05-21 ENCOUNTER — RESULTS FOLLOW-UP (OUTPATIENT)
Dept: ANTICOAGULATION | Facility: CLINIC | Age: 60
End: 2025-05-21

## 2025-05-21 ENCOUNTER — LAB (OUTPATIENT)
Dept: LAB | Facility: CLINIC | Age: 60
End: 2025-05-21
Payer: COMMERCIAL

## 2025-05-21 DIAGNOSIS — Z86.718 PERSONAL HISTORY OF DVT (DEEP VEIN THROMBOSIS): ICD-10-CM

## 2025-05-21 DIAGNOSIS — Z79.01 LONG TERM CURRENT USE OF ANTICOAGULANTS WITH INR GOAL OF 2.0-3.0: ICD-10-CM

## 2025-05-21 DIAGNOSIS — Z86.711 HISTORY OF PULMONARY EMBOLISM: Primary | ICD-10-CM

## 2025-05-21 DIAGNOSIS — Z86.711 HISTORY OF PULMONARY EMBOLISM: ICD-10-CM

## 2025-05-21 LAB — INR BLD: 2 (ref 0.9–1.1)

## 2025-05-21 PROCEDURE — 85610 PROTHROMBIN TIME: CPT

## 2025-05-21 PROCEDURE — 36416 COLLJ CAPILLARY BLOOD SPEC: CPT

## 2025-05-21 NOTE — PROGRESS NOTES
ANTICOAGULATION MANAGEMENT     Schuyler Castro 59 year old male is on warfarin with therapeutic INR result. (Goal INR 2.0-3.0)    Recent labs: (last 7 days)     05/21/25  0716   INR 2.0*       ASSESSMENT     Source(s): Chart Review and Patient/Caregiver Call     Warfarin doses taken: Warfarin taken as instructed  Diet: No new diet changes identified  Medication/supplement changes: None noted  New illness, injury, or hospitalization: No  Signs or symptoms of bleeding or clotting: No  Previous result: Therapeutic last 2(+) visits  Additional findings: patient is unsure when he will schedule his colonoscopy.  He is aware that he will need to hold warfarin and notify anticoagulation clinic when procedure is scheduled.       PLAN     Recommended plan for no diet, medication or health factor changes affecting INR     Dosing Instructions: Continue your current warfarin dose with next INR in 7 weeks (due to July 4th holiday)      Summary  As of 5/21/2025      Full warfarin instructions:  5 mg every Tue, Sat; 7.5 mg all other days   Next INR check:  7/9/2025               Telephone call with David who agrees to plan and repeated back plan correctly    Lab visit scheduled    Education provided: Please call back if any changes to your diet, medications or how you've been taking warfarin    Plan made per [facility] anticoagulation protocol    Violeta Sanchez RN  5/21/2025  Anticoagulation Clinic  [employer] for routing messages: karime GARCIA  [facility] patient phone line: [phone]        _______________________________________________________________________     Anticoagulation Episode Summary       Current INR goal:  2.0-3.0   TTR:  100.0% (1 y)   Target end date:  Indefinite   Send INR reminders to:  CASEY GARCIA    Indications    Personal history of DVT (deep vein thrombosis) (Resolved) [Z14.371]  History of pulmonary embolism [Z86.331]  Long term current use of anticoagulants with INR goal of 2.0-3.0 [Z79.01]  Personal  history of DVT (deep vein thrombosis) [Z86.718]             Comments:  6/7/21-OK for 12 week INR monitoring             Anticoagulation Care Providers       Provider Role Specialty Phone number    Rasheeda Juan MD Referring Family Medicine [phone]    Jess Kauffman APRN CNP Referring Family Medicine [phone]

## 2025-07-12 ENCOUNTER — HEALTH MAINTENANCE LETTER (OUTPATIENT)
Age: 60
End: 2025-07-12

## 2025-08-11 ENCOUNTER — PATIENT OUTREACH (OUTPATIENT)
Dept: CARE COORDINATION | Facility: CLINIC | Age: 60
End: 2025-08-11
Payer: COMMERCIAL

## (undated) DEVICE — ESU PENCIL W/HOLSTER E2350H

## (undated) DEVICE — GLOVE BIOGEL PI MICRO SZ 8.0 48580

## (undated) DEVICE — GLOVE BIOGEL PI MICRO SZ 7.5 48575

## (undated) DEVICE — ESU GROUND PAD ADULT W/CORD E7507

## (undated) DEVICE — DAVINCI HOT SHEARS TIP COVER  400180

## (undated) DEVICE — DAVINCI XI DRAPE ARM 470015

## (undated) DEVICE — DAVINCI XI OBTURATOR BLADELESS 8MM 470359

## (undated) DEVICE — SU VICRYL 4-0 PS-2 18" UND J496H

## (undated) DEVICE — Device

## (undated) DEVICE — LUBRICANT INST ELECTROLUBE EL101

## (undated) DEVICE — DRAPE MAYO STAND 23X54 8337

## (undated) DEVICE — SU STRATAFIX PDS PLUS 3-0 SPIRAL SH 15CM SXPP1B420

## (undated) DEVICE — KIT ENDO TURNOVER/PROCEDURE W/CLEAN A SCOPE LINERS 103888

## (undated) DEVICE — SOL WATER IRRIG 1000ML BOTTLE 2F7114

## (undated) DEVICE — SUTURE VICRYL+ 0 CT-2 18" VCP727H

## (undated) DEVICE — ESU ELEC BLADE 2.75" COATED/INSULATED E1455

## (undated) DEVICE — DAVINCI XI SEAL UNIVERSAL 5-12MM 470500

## (undated) DEVICE — LINEN ORTHO ACL PACK 5447

## (undated) RX ORDER — ONDANSETRON 2 MG/ML
INJECTION INTRAMUSCULAR; INTRAVENOUS
Status: DISPENSED
Start: 2025-01-13

## (undated) RX ORDER — PROPOFOL 10 MG/ML
INJECTION, EMULSION INTRAVENOUS
Status: DISPENSED
Start: 2025-01-13

## (undated) RX ORDER — KETOROLAC TROMETHAMINE 30 MG/ML
INJECTION, SOLUTION INTRAMUSCULAR; INTRAVENOUS
Status: DISPENSED
Start: 2025-01-13

## (undated) RX ORDER — EPHEDRINE SULFATE 50 MG/ML
INJECTION, SOLUTION INTRAMUSCULAR; INTRAVENOUS; SUBCUTANEOUS
Status: DISPENSED
Start: 2025-01-13

## (undated) RX ORDER — SIMETHICONE 40MG/0.6ML
SUSPENSION, DROPS(FINAL DOSAGE FORM)(ML) ORAL
Status: DISPENSED
Start: 2023-05-25

## (undated) RX ORDER — BUPIVACAINE HYDROCHLORIDE AND EPINEPHRINE 5; 5 MG/ML; UG/ML
INJECTION, SOLUTION EPIDURAL; INTRACAUDAL; PERINEURAL
Status: DISPENSED
Start: 2025-01-13

## (undated) RX ORDER — FENTANYL CITRATE 50 UG/ML
INJECTION, SOLUTION INTRAMUSCULAR; INTRAVENOUS
Status: DISPENSED
Start: 2025-01-13

## (undated) RX ORDER — DEXAMETHASONE SODIUM PHOSPHATE 4 MG/ML
INJECTION, SOLUTION INTRA-ARTICULAR; INTRALESIONAL; INTRAMUSCULAR; INTRAVENOUS; SOFT TISSUE
Status: DISPENSED
Start: 2025-01-13

## (undated) RX ORDER — FENTANYL CITRATE 50 UG/ML
INJECTION, SOLUTION INTRAMUSCULAR; INTRAVENOUS
Status: DISPENSED
Start: 2023-05-25

## (undated) RX ORDER — LIDOCAINE HYDROCHLORIDE 10 MG/ML
INJECTION, SOLUTION EPIDURAL; INFILTRATION; INTRACAUDAL; PERINEURAL
Status: DISPENSED
Start: 2025-01-13

## (undated) RX ORDER — CEFAZOLIN SODIUM/WATER 2 G/20 ML
SYRINGE (ML) INTRAVENOUS
Status: DISPENSED
Start: 2025-01-13

## (undated) RX ORDER — FENTANYL CITRATE-0.9 % NACL/PF 10 MCG/ML
PLASTIC BAG, INJECTION (ML) INTRAVENOUS
Status: DISPENSED
Start: 2025-01-13

## (undated) RX ORDER — OXYCODONE HYDROCHLORIDE 5 MG/1
TABLET ORAL
Status: DISPENSED
Start: 2025-01-13